# Patient Record
Sex: FEMALE | Race: BLACK OR AFRICAN AMERICAN | Employment: OTHER | URBAN - METROPOLITAN AREA
[De-identification: names, ages, dates, MRNs, and addresses within clinical notes are randomized per-mention and may not be internally consistent; named-entity substitution may affect disease eponyms.]

---

## 2017-02-11 ENCOUNTER — HOSPITAL ENCOUNTER (OUTPATIENT)
Age: 62
Setting detail: OBSERVATION
Discharge: HOME | End: 2017-02-13
Attending: EMERGENCY MEDICINE | Admitting: INTERNAL MEDICINE
Payer: COMMERCIAL

## 2017-02-11 ENCOUNTER — APPOINTMENT (OUTPATIENT)
Dept: CT IMAGING | Age: 62
End: 2017-02-11
Payer: COMMERCIAL

## 2017-02-11 DIAGNOSIS — M79.602 PAIN OF LEFT UPPER EXTREMITY: Primary | ICD-10-CM

## 2017-02-11 LAB
BASOPHILS ABSOLUTE: 0.1 K/UL (ref 0–0.2)
BASOPHILS RELATIVE PERCENT: 0.8 %
EOSINOPHILS ABSOLUTE: 0.2 K/UL (ref 0–0.7)
EOSINOPHILS RELATIVE PERCENT: 3 %
HCT VFR BLD CALC: 41.2 % (ref 37–47)
HEMOGLOBIN: 13.6 G/DL (ref 12–16)
HYPOCHROMIA: PRESENT
LYMPHOCYTES ABSOLUTE: 2.4 K/UL (ref 1–4.8)
LYMPHOCYTES RELATIVE PERCENT: 31.6 %
MCH RBC QN AUTO: 26.9 PG (ref 27–31.3)
MCHC RBC AUTO-ENTMCNC: 33 % (ref 33–37)
MCV RBC AUTO: 81.5 FL (ref 82–100)
MONOCYTES ABSOLUTE: 0.5 K/UL (ref 0.2–0.8)
MONOCYTES RELATIVE PERCENT: 6.9 %
NEUTROPHILS ABSOLUTE: 4.3 K/UL (ref 1.4–6.5)
NEUTROPHILS RELATIVE PERCENT: 57.7 %
PDW BLD-RTO: 14.1 % (ref 11.5–14.5)
PLATELET # BLD: 309 K/UL (ref 130–400)
RBC # BLD: 5.06 M/UL (ref 4.2–5.4)
WBC # BLD: 7.5 K/UL (ref 4.8–10.8)

## 2017-02-11 PROCEDURE — 85025 COMPLETE CBC W/AUTO DIFF WBC: CPT

## 2017-02-11 PROCEDURE — 84484 ASSAY OF TROPONIN QUANT: CPT

## 2017-02-11 PROCEDURE — 70450 CT HEAD/BRAIN W/O DYE: CPT

## 2017-02-11 PROCEDURE — 85610 PROTHROMBIN TIME: CPT

## 2017-02-11 PROCEDURE — 80053 COMPREHEN METABOLIC PANEL: CPT

## 2017-02-11 PROCEDURE — 85730 THROMBOPLASTIN TIME PARTIAL: CPT

## 2017-02-11 PROCEDURE — 99285 EMERGENCY DEPT VISIT HI MDM: CPT

## 2017-02-11 PROCEDURE — 93005 ELECTROCARDIOGRAM TRACING: CPT

## 2017-02-11 ASSESSMENT — ENCOUNTER SYMPTOMS
COLOR CHANGE: 0
EYE PAIN: 0
BLOOD IN STOOL: 0
VOMITING: 0
SHORTNESS OF BREATH: 0
ABDOMINAL PAIN: 0
COUGH: 0
RHINORRHEA: 0
EYE REDNESS: 0

## 2017-02-12 ENCOUNTER — APPOINTMENT (OUTPATIENT)
Dept: GENERAL RADIOLOGY | Age: 62
End: 2017-02-12
Payer: COMMERCIAL

## 2017-02-12 PROBLEM — G45.9 TIA (TRANSIENT ISCHEMIC ATTACK): Status: ACTIVE | Noted: 2017-02-12

## 2017-02-12 LAB
ALBUMIN SERPL-MCNC: 4 G/DL (ref 3.9–4.9)
ALP BLD-CCNC: 89 U/L (ref 40–130)
ALT SERPL-CCNC: 18 U/L (ref 0–33)
ANION GAP SERPL CALCULATED.3IONS-SCNC: 13 MEQ/L (ref 7–13)
APTT: 24.6 SEC (ref 21.6–35.4)
AST SERPL-CCNC: 16 U/L (ref 0–35)
BILIRUB SERPL-MCNC: 0.2 MG/DL (ref 0–1.2)
BILIRUBIN URINE: NEGATIVE
BLOOD, URINE: NEGATIVE
BUN BLDV-MCNC: 12 MG/DL (ref 8–23)
CALCIUM SERPL-MCNC: 9.5 MG/DL (ref 8.6–10.2)
CHLORIDE BLD-SCNC: 104 MEQ/L (ref 98–107)
CHOLESTEROL, TOTAL: 244 MG/DL (ref 0–199)
CLARITY: CLEAR
CO2: 27 MEQ/L (ref 22–29)
COLOR: YELLOW
CREAT SERPL-MCNC: 0.59 MG/DL (ref 0.5–0.9)
GFR AFRICAN AMERICAN: >60
GFR NON-AFRICAN AMERICAN: >60
GLOBULIN: 3.3 G/DL (ref 2.3–3.5)
GLUCOSE BLD-MCNC: 156 MG/DL (ref 74–109)
GLUCOSE URINE: NEGATIVE MG/DL
HBA1C MFR BLD: 5.7 % (ref 4.8–5.9)
HDLC SERPL-MCNC: 59 MG/DL (ref 40–59)
INR BLD: 0.9
KETONES, URINE: NEGATIVE MG/DL
LDL CHOLESTEROL CALCULATED: 172 MG/DL (ref 0–129)
LEUKOCYTE ESTERASE, URINE: NEGATIVE
NITRITE, URINE: NEGATIVE
PH UA: 6.5 (ref 5–9)
POTASSIUM SERPL-SCNC: 3.8 MEQ/L (ref 3.5–5.1)
PROTEIN UA: NEGATIVE MG/DL
PROTHROMBIN TIME: 9.7 SEC (ref 9.6–12.3)
SODIUM BLD-SCNC: 144 MEQ/L (ref 132–144)
SPECIFIC GRAVITY UA: 1.02 (ref 1–1.03)
TOTAL PROTEIN: 7.3 G/DL (ref 6.4–8.1)
TRIGL SERPL-MCNC: 65 MG/DL (ref 0–200)
TROPONIN: <0.01 NG/ML (ref 0–0.01)
URINE REFLEX TO CULTURE: NORMAL
UROBILINOGEN, URINE: 0.2 E.U./DL

## 2017-02-12 PROCEDURE — 71010 XR CHEST PORTABLE: CPT

## 2017-02-12 PROCEDURE — 36415 COLL VENOUS BLD VENIPUNCTURE: CPT

## 2017-02-12 PROCEDURE — 6370000000 HC RX 637 (ALT 250 FOR IP): Performed by: INTERNAL MEDICINE

## 2017-02-12 PROCEDURE — 96374 THER/PROPH/DIAG INJ IV PUSH: CPT

## 2017-02-12 PROCEDURE — G0378 HOSPITAL OBSERVATION PER HR: HCPCS

## 2017-02-12 PROCEDURE — 93306 TTE W/DOPPLER COMPLETE: CPT

## 2017-02-12 PROCEDURE — 81003 URINALYSIS AUTO W/O SCOPE: CPT

## 2017-02-12 PROCEDURE — 80061 LIPID PANEL: CPT

## 2017-02-12 PROCEDURE — 6360000002 HC RX W HCPCS: Performed by: INTERNAL MEDICINE

## 2017-02-12 PROCEDURE — 72052 X-RAY EXAM NECK SPINE 6/>VWS: CPT

## 2017-02-12 PROCEDURE — 83036 HEMOGLOBIN GLYCOSYLATED A1C: CPT

## 2017-02-12 PROCEDURE — 2580000003 HC RX 258: Performed by: INTERNAL MEDICINE

## 2017-02-12 PROCEDURE — 6370000000 HC RX 637 (ALT 250 FOR IP): Performed by: EMERGENCY MEDICINE

## 2017-02-12 PROCEDURE — 93005 ELECTROCARDIOGRAM TRACING: CPT

## 2017-02-12 PROCEDURE — 84484 ASSAY OF TROPONIN QUANT: CPT

## 2017-02-12 RX ORDER — NITROGLYCERIN 0.4 MG/1
0.4 TABLET SUBLINGUAL EVERY 5 MIN PRN
Status: DISCONTINUED | OUTPATIENT
Start: 2017-02-12 | End: 2017-02-13 | Stop reason: HOSPADM

## 2017-02-12 RX ORDER — ASPIRIN 81 MG/1
81 TABLET ORAL DAILY
Status: DISCONTINUED | OUTPATIENT
Start: 2017-02-13 | End: 2017-02-13 | Stop reason: HOSPADM

## 2017-02-12 RX ORDER — SENNA PLUS 8.6 MG/1
1 TABLET ORAL 2 TIMES DAILY PRN
Status: DISCONTINUED | OUTPATIENT
Start: 2017-02-12 | End: 2017-02-13 | Stop reason: HOSPADM

## 2017-02-12 RX ORDER — ASPIRIN 325 MG
325 TABLET ORAL ONCE
Status: COMPLETED | OUTPATIENT
Start: 2017-02-12 | End: 2017-02-12

## 2017-02-12 RX ORDER — SODIUM CHLORIDE 0.9 % (FLUSH) 0.9 %
10 SYRINGE (ML) INJECTION PRN
Status: DISCONTINUED | OUTPATIENT
Start: 2017-02-12 | End: 2017-02-13 | Stop reason: HOSPADM

## 2017-02-12 RX ORDER — AMLODIPINE BESYLATE 5 MG/1
5 TABLET ORAL DAILY
Qty: 30 TABLET | Refills: 3 | Status: SHIPPED | OUTPATIENT
Start: 2017-02-12 | End: 2017-02-13

## 2017-02-12 RX ORDER — FAMOTIDINE 20 MG/1
20 TABLET, FILM COATED ORAL 2 TIMES DAILY
Status: DISCONTINUED | OUTPATIENT
Start: 2017-02-12 | End: 2017-02-13 | Stop reason: HOSPADM

## 2017-02-12 RX ORDER — ACETAMINOPHEN 325 MG/1
650 TABLET ORAL EVERY 4 HOURS PRN
Status: DISCONTINUED | OUTPATIENT
Start: 2017-02-12 | End: 2017-02-13 | Stop reason: HOSPADM

## 2017-02-12 RX ORDER — ALUMINA, MAGNESIA, AND SIMETHICONE 2400; 2400; 240 MG/30ML; MG/30ML; MG/30ML
30 SUSPENSION ORAL EVERY 6 HOURS PRN
Status: DISCONTINUED | OUTPATIENT
Start: 2017-02-12 | End: 2017-02-13 | Stop reason: HOSPADM

## 2017-02-12 RX ORDER — SODIUM CHLORIDE 0.9 % (FLUSH) 0.9 %
10 SYRINGE (ML) INJECTION EVERY 12 HOURS SCHEDULED
Status: DISCONTINUED | OUTPATIENT
Start: 2017-02-12 | End: 2017-02-13 | Stop reason: HOSPADM

## 2017-02-12 RX ORDER — AMLODIPINE BESYLATE 5 MG/1
5 TABLET ORAL DAILY
Status: DISCONTINUED | OUTPATIENT
Start: 2017-02-12 | End: 2017-02-13

## 2017-02-12 RX ORDER — ASPIRIN 81 MG/1
81 TABLET ORAL DAILY
Qty: 30 TABLET | Refills: 3 | Status: SHIPPED | OUTPATIENT
Start: 2017-02-13

## 2017-02-12 RX ORDER — TIZANIDINE 2 MG/1
4 TABLET ORAL 3 TIMES DAILY
Status: DISCONTINUED | OUTPATIENT
Start: 2017-02-12 | End: 2017-02-13 | Stop reason: HOSPADM

## 2017-02-12 RX ORDER — ATORVASTATIN CALCIUM 40 MG/1
40 TABLET, FILM COATED ORAL NIGHTLY
Qty: 30 TABLET | Refills: 3 | Status: SHIPPED | OUTPATIENT
Start: 2017-02-12 | End: 2017-12-27 | Stop reason: SDUPTHER

## 2017-02-12 RX ORDER — POLYETHYLENE GLYCOL 3350 17 G/17G
17 POWDER, FOR SOLUTION ORAL DAILY PRN
Status: DISCONTINUED | OUTPATIENT
Start: 2017-02-12 | End: 2017-02-13 | Stop reason: HOSPADM

## 2017-02-12 RX ORDER — HYDRALAZINE HYDROCHLORIDE 20 MG/ML
10 INJECTION INTRAMUSCULAR; INTRAVENOUS EVERY 4 HOURS PRN
Status: DISCONTINUED | OUTPATIENT
Start: 2017-02-12 | End: 2017-02-13 | Stop reason: HOSPADM

## 2017-02-12 RX ORDER — ATORVASTATIN CALCIUM 40 MG/1
40 TABLET, FILM COATED ORAL NIGHTLY
Status: DISCONTINUED | OUTPATIENT
Start: 2017-02-12 | End: 2017-02-13 | Stop reason: HOSPADM

## 2017-02-12 RX ORDER — DOCUSATE SODIUM 100 MG/1
100 CAPSULE, LIQUID FILLED ORAL DAILY PRN
Status: DISCONTINUED | OUTPATIENT
Start: 2017-02-12 | End: 2017-02-13 | Stop reason: HOSPADM

## 2017-02-12 RX ORDER — ONDANSETRON 2 MG/ML
4 INJECTION INTRAMUSCULAR; INTRAVENOUS EVERY 6 HOURS PRN
Status: DISCONTINUED | OUTPATIENT
Start: 2017-02-12 | End: 2017-02-13 | Stop reason: HOSPADM

## 2017-02-12 RX ADMIN — FAMOTIDINE 20 MG: 20 TABLET, FILM COATED ORAL at 19:36

## 2017-02-12 RX ADMIN — FAMOTIDINE 20 MG: 20 TABLET, FILM COATED ORAL at 03:26

## 2017-02-12 RX ADMIN — METOPROLOL TARTRATE 12.5 MG: 25 TABLET ORAL at 11:40

## 2017-02-12 RX ADMIN — ASPIRIN 325 MG: 325 TABLET, COATED ORAL at 08:56

## 2017-02-12 RX ADMIN — ATORVASTATIN CALCIUM 40 MG: 40 TABLET, FILM COATED ORAL at 19:36

## 2017-02-12 RX ADMIN — TIZANIDINE 4 MG: 2 TABLET ORAL at 03:28

## 2017-02-12 RX ADMIN — NITROGLYCERIN 0.5 INCH: 20 OINTMENT TOPICAL at 08:55

## 2017-02-12 RX ADMIN — METOPROLOL TARTRATE 12.5 MG: 25 TABLET ORAL at 03:27

## 2017-02-12 RX ADMIN — FAMOTIDINE 20 MG: 20 TABLET, FILM COATED ORAL at 08:56

## 2017-02-12 RX ADMIN — HYDRALAZINE HYDROCHLORIDE 10 MG: 20 INJECTION INTRAMUSCULAR; INTRAVENOUS at 03:25

## 2017-02-12 RX ADMIN — Medication 10 ML: at 19:36

## 2017-02-12 RX ADMIN — AMLODIPINE BESYLATE 5 MG: 5 TABLET ORAL at 17:13

## 2017-02-12 RX ADMIN — Medication 10 ML: at 08:55

## 2017-02-12 RX ADMIN — ASPIRIN 325 MG: 325 TABLET ORAL at 01:27

## 2017-02-13 ENCOUNTER — APPOINTMENT (OUTPATIENT)
Dept: ULTRASOUND IMAGING | Age: 62
End: 2017-02-13
Payer: COMMERCIAL

## 2017-02-13 VITALS
RESPIRATION RATE: 16 BRPM | TEMPERATURE: 98.2 F | HEART RATE: 64 BPM | WEIGHT: 217.4 LBS | BODY MASS INDEX: 40.01 KG/M2 | HEIGHT: 62 IN | OXYGEN SATURATION: 98 % | DIASTOLIC BLOOD PRESSURE: 99 MMHG | SYSTOLIC BLOOD PRESSURE: 155 MMHG

## 2017-02-13 LAB
ALBUMIN SERPL-MCNC: 3.7 G/DL (ref 3.9–4.9)
ALP BLD-CCNC: 80 U/L (ref 40–130)
ALT SERPL-CCNC: 15 U/L (ref 0–33)
ANION GAP SERPL CALCULATED.3IONS-SCNC: 11 MEQ/L (ref 7–13)
AST SERPL-CCNC: 13 U/L (ref 0–35)
BASOPHILS ABSOLUTE: 0 K/UL (ref 0–0.2)
BASOPHILS RELATIVE PERCENT: 0.3 %
BILIRUB SERPL-MCNC: 0.2 MG/DL (ref 0–1.2)
BUN BLDV-MCNC: 16 MG/DL (ref 8–23)
CALCIUM SERPL-MCNC: 9.2 MG/DL (ref 8.6–10.2)
CHLORIDE BLD-SCNC: 105 MEQ/L (ref 98–107)
CO2: 25 MEQ/L (ref 22–29)
CREAT SERPL-MCNC: 0.84 MG/DL (ref 0.5–0.9)
EKG ATRIAL RATE: 62 BPM
EKG P AXIS: 48 DEGREES
EKG P-R INTERVAL: 192 MS
EKG Q-T INTERVAL: 422 MS
EKG QRS DURATION: 80 MS
EKG QTC CALCULATION (BAZETT): 428 MS
EKG R AXIS: 6 DEGREES
EKG T AXIS: 21 DEGREES
EKG VENTRICULAR RATE: 62 BPM
EOSINOPHILS ABSOLUTE: 0.2 K/UL (ref 0–0.7)
EOSINOPHILS RELATIVE PERCENT: 3 %
GFR AFRICAN AMERICAN: >60
GFR NON-AFRICAN AMERICAN: >60
GLOBULIN: 3.1 G/DL (ref 2.3–3.5)
GLUCOSE BLD-MCNC: 104 MG/DL (ref 74–109)
HCT VFR BLD CALC: 40.8 % (ref 37–47)
HEMOGLOBIN: 13.2 G/DL (ref 12–16)
HYPOCHROMIA: PRESENT
LYMPHOCYTES ABSOLUTE: 2.4 K/UL (ref 1–4.8)
LYMPHOCYTES RELATIVE PERCENT: 32.7 %
MCH RBC QN AUTO: 26.7 PG (ref 27–31.3)
MCHC RBC AUTO-ENTMCNC: 32.3 % (ref 33–37)
MCV RBC AUTO: 82.7 FL (ref 82–100)
MONOCYTES ABSOLUTE: 0.3 K/UL (ref 0.2–0.8)
MONOCYTES RELATIVE PERCENT: 4.3 %
NEUTROPHILS ABSOLUTE: 4.3 K/UL (ref 1.4–6.5)
NEUTROPHILS RELATIVE PERCENT: 59.7 %
PDW BLD-RTO: 14.2 % (ref 11.5–14.5)
PLATELET # BLD: 278 K/UL (ref 130–400)
POTASSIUM SERPL-SCNC: 3.9 MEQ/L (ref 3.5–5.1)
RBC # BLD: 4.94 M/UL (ref 4.2–5.4)
SODIUM BLD-SCNC: 141 MEQ/L (ref 132–144)
TOTAL PROTEIN: 6.8 G/DL (ref 6.4–8.1)
TROPONIN: <0.01 NG/ML (ref 0–0.01)
WBC # BLD: 7.3 K/UL (ref 4.8–10.8)

## 2017-02-13 PROCEDURE — 6370000000 HC RX 637 (ALT 250 FOR IP): Performed by: INTERNAL MEDICINE

## 2017-02-13 PROCEDURE — 97162 PT EVAL MOD COMPLEX 30 MIN: CPT

## 2017-02-13 PROCEDURE — G8980 MOBILITY D/C STATUS: HCPCS

## 2017-02-13 PROCEDURE — 80053 COMPREHEN METABOLIC PANEL: CPT

## 2017-02-13 PROCEDURE — G0378 HOSPITAL OBSERVATION PER HR: HCPCS

## 2017-02-13 PROCEDURE — 97110 THERAPEUTIC EXERCISES: CPT

## 2017-02-13 PROCEDURE — G8978 MOBILITY CURRENT STATUS: HCPCS

## 2017-02-13 PROCEDURE — 84484 ASSAY OF TROPONIN QUANT: CPT

## 2017-02-13 PROCEDURE — G8979 MOBILITY GOAL STATUS: HCPCS

## 2017-02-13 PROCEDURE — 93880 EXTRACRANIAL BILAT STUDY: CPT

## 2017-02-13 PROCEDURE — 36415 COLL VENOUS BLD VENIPUNCTURE: CPT

## 2017-02-13 PROCEDURE — 85025 COMPLETE CBC W/AUTO DIFF WBC: CPT

## 2017-02-13 RX ORDER — AMLODIPINE BESYLATE 5 MG/1
10 TABLET ORAL DAILY
Qty: 30 TABLET | Refills: 3 | Status: SHIPPED | OUTPATIENT
Start: 2017-02-13 | End: 2017-06-12 | Stop reason: SDUPTHER

## 2017-02-13 RX ORDER — AMLODIPINE BESYLATE 5 MG/1
5 TABLET ORAL ONCE
Status: COMPLETED | OUTPATIENT
Start: 2017-02-13 | End: 2017-02-13

## 2017-02-13 RX ORDER — AMLODIPINE BESYLATE 10 MG/1
10 TABLET ORAL DAILY
Status: DISCONTINUED | OUTPATIENT
Start: 2017-02-14 | End: 2017-02-13 | Stop reason: HOSPADM

## 2017-02-13 RX ORDER — BUTALBITAL, ACETAMINOPHEN AND CAFFEINE 300; 40; 50 MG/1; MG/1; MG/1
1 CAPSULE ORAL EVERY 6 HOURS PRN
Status: DISCONTINUED | OUTPATIENT
Start: 2017-02-13 | End: 2017-02-13 | Stop reason: HOSPADM

## 2017-02-13 RX ADMIN — ASPIRIN 81 MG: 81 TABLET ORAL at 11:14

## 2017-02-13 RX ADMIN — AMLODIPINE BESYLATE 5 MG: 5 TABLET ORAL at 11:14

## 2017-02-13 RX ADMIN — AMLODIPINE BESYLATE 5 MG: 5 TABLET ORAL at 11:13

## 2017-02-13 RX ADMIN — FAMOTIDINE 20 MG: 20 TABLET, FILM COATED ORAL at 11:15

## 2017-02-13 RX ADMIN — TIZANIDINE 4 MG: 2 TABLET ORAL at 11:15

## 2017-02-13 ASSESSMENT — PAIN SCALES - GENERAL: PAINLEVEL_OUTOF10: 0

## 2017-02-14 ENCOUNTER — OFFICE VISIT (OUTPATIENT)
Dept: FAMILY MEDICINE CLINIC | Age: 62
End: 2017-02-14

## 2017-02-14 VITALS
BODY MASS INDEX: 39.93 KG/M2 | DIASTOLIC BLOOD PRESSURE: 94 MMHG | TEMPERATURE: 98.1 F | HEIGHT: 62 IN | HEART RATE: 78 BPM | WEIGHT: 217 LBS | RESPIRATION RATE: 16 BRPM | SYSTOLIC BLOOD PRESSURE: 152 MMHG

## 2017-02-14 DIAGNOSIS — M47.12 OSTEOARTHRITIS OF CERVICAL SPINE WITH MYELOPATHY: ICD-10-CM

## 2017-02-14 DIAGNOSIS — G44.52 NEW DAILY PERSISTENT HEADACHE: ICD-10-CM

## 2017-02-14 DIAGNOSIS — E78.2 MIXED HYPERLIPIDEMIA: Primary | ICD-10-CM

## 2017-02-14 DIAGNOSIS — R43.0 ANOSMIA: ICD-10-CM

## 2017-02-14 DIAGNOSIS — I10 ESSENTIAL HYPERTENSION: ICD-10-CM

## 2017-02-14 PROCEDURE — 3014F SCREEN MAMMO DOC REV: CPT | Performed by: FAMILY MEDICINE

## 2017-02-14 PROCEDURE — 99214 OFFICE O/P EST MOD 30 MIN: CPT | Performed by: FAMILY MEDICINE

## 2017-02-14 PROCEDURE — G8419 CALC BMI OUT NRM PARAM NOF/U: HCPCS | Performed by: FAMILY MEDICINE

## 2017-02-14 PROCEDURE — 3017F COLORECTAL CA SCREEN DOC REV: CPT | Performed by: FAMILY MEDICINE

## 2017-02-14 PROCEDURE — 1036F TOBACCO NON-USER: CPT | Performed by: FAMILY MEDICINE

## 2017-02-14 PROCEDURE — G8427 DOCREV CUR MEDS BY ELIG CLIN: HCPCS | Performed by: FAMILY MEDICINE

## 2017-02-14 PROCEDURE — G8484 FLU IMMUNIZE NO ADMIN: HCPCS | Performed by: FAMILY MEDICINE

## 2017-02-14 RX ORDER — ALPRAZOLAM 0.5 MG/1
0.5 TABLET ORAL 3 TIMES DAILY PRN
Qty: 10 TABLET | Refills: 0 | Status: SHIPPED | OUTPATIENT
Start: 2017-02-14 | End: 2017-03-16

## 2017-02-14 RX ORDER — FLUTICASONE PROPIONATE 50 MCG
1 SPRAY, SUSPENSION (ML) NASAL DAILY
Qty: 1 BOTTLE | Refills: 3 | Status: SHIPPED | OUTPATIENT
Start: 2017-02-14 | End: 2017-09-25 | Stop reason: SDUPTHER

## 2017-02-18 ENCOUNTER — HOSPITAL ENCOUNTER (OUTPATIENT)
Dept: MRI IMAGING | Age: 62
Discharge: HOME | End: 2017-02-18
Payer: COMMERCIAL

## 2017-02-18 DIAGNOSIS — G44.52 NEW DAILY PERSISTENT HEADACHE: ICD-10-CM

## 2017-02-18 DIAGNOSIS — R43.0 ANOSMIA: ICD-10-CM

## 2017-02-25 ENCOUNTER — HOSPITAL ENCOUNTER (OUTPATIENT)
Dept: MRI IMAGING | Age: 62
Discharge: HOME | End: 2017-02-25
Payer: COMMERCIAL

## 2017-02-25 DIAGNOSIS — R51.9 FREQUENT HEADACHES: ICD-10-CM

## 2017-02-25 PROCEDURE — 70551 MRI BRAIN STEM W/O DYE: CPT

## 2017-03-13 ENCOUNTER — OFFICE VISIT (OUTPATIENT)
Dept: FAMILY MEDICINE CLINIC | Age: 62
End: 2017-03-13

## 2017-03-13 VITALS
RESPIRATION RATE: 14 BRPM | WEIGHT: 221 LBS | DIASTOLIC BLOOD PRESSURE: 84 MMHG | HEART RATE: 72 BPM | BODY MASS INDEX: 40.67 KG/M2 | TEMPERATURE: 97.7 F | SYSTOLIC BLOOD PRESSURE: 124 MMHG | HEIGHT: 62 IN

## 2017-03-13 DIAGNOSIS — E78.2 MIXED HYPERLIPIDEMIA: ICD-10-CM

## 2017-03-13 DIAGNOSIS — Z12.31 ENCOUNTER FOR SCREENING MAMMOGRAM FOR BREAST CANCER: ICD-10-CM

## 2017-03-13 DIAGNOSIS — I10 ESSENTIAL HYPERTENSION: Primary | ICD-10-CM

## 2017-03-13 DIAGNOSIS — I67.9 CEREBROVASCULAR SMALL VESSEL DISEASE: ICD-10-CM

## 2017-03-13 PROCEDURE — 1036F TOBACCO NON-USER: CPT | Performed by: FAMILY MEDICINE

## 2017-03-13 PROCEDURE — 3017F COLORECTAL CA SCREEN DOC REV: CPT | Performed by: FAMILY MEDICINE

## 2017-03-13 PROCEDURE — G8417 CALC BMI ABV UP PARAM F/U: HCPCS | Performed by: FAMILY MEDICINE

## 2017-03-13 PROCEDURE — G8484 FLU IMMUNIZE NO ADMIN: HCPCS | Performed by: FAMILY MEDICINE

## 2017-03-13 PROCEDURE — 99213 OFFICE O/P EST LOW 20 MIN: CPT | Performed by: FAMILY MEDICINE

## 2017-03-13 PROCEDURE — G8427 DOCREV CUR MEDS BY ELIG CLIN: HCPCS | Performed by: FAMILY MEDICINE

## 2017-03-13 PROCEDURE — 3014F SCREEN MAMMO DOC REV: CPT | Performed by: FAMILY MEDICINE

## 2017-03-13 ASSESSMENT — PATIENT HEALTH QUESTIONNAIRE - PHQ9
SUM OF ALL RESPONSES TO PHQ9 QUESTIONS 1 & 2: 0
2. FEELING DOWN, DEPRESSED OR HOPELESS: 0
1. LITTLE INTEREST OR PLEASURE IN DOING THINGS: 0
SUM OF ALL RESPONSES TO PHQ QUESTIONS 1-9: 0

## 2017-03-17 LAB
EKG ATRIAL RATE: 91 BPM
EKG P AXIS: 55 DEGREES
EKG P-R INTERVAL: 196 MS
EKG Q-T INTERVAL: 370 MS
EKG QRS DURATION: 68 MS
EKG QTC CALCULATION (BAZETT): 455 MS
EKG R AXIS: 15 DEGREES
EKG T AXIS: 17 DEGREES
EKG VENTRICULAR RATE: 91 BPM

## 2017-06-12 DIAGNOSIS — E78.2 MIXED HYPERLIPIDEMIA: Primary | ICD-10-CM

## 2017-06-12 DIAGNOSIS — I10 ESSENTIAL HYPERTENSION: ICD-10-CM

## 2017-06-12 RX ORDER — AMLODIPINE BESYLATE 5 MG/1
10 TABLET ORAL DAILY
Qty: 60 TABLET | Refills: 0 | Status: SHIPPED | OUTPATIENT
Start: 2017-06-12 | End: 2017-07-11 | Stop reason: SDUPTHER

## 2017-06-24 DIAGNOSIS — I10 ESSENTIAL HYPERTENSION: ICD-10-CM

## 2017-06-24 DIAGNOSIS — E78.2 MIXED HYPERLIPIDEMIA: ICD-10-CM

## 2017-06-24 LAB
ALBUMIN SERPL-MCNC: 4.1 G/DL (ref 3.9–4.9)
ALP BLD-CCNC: 101 U/L (ref 40–130)
ALT SERPL-CCNC: 19 U/L (ref 0–33)
ANION GAP SERPL CALCULATED.3IONS-SCNC: 11 MEQ/L (ref 7–13)
AST SERPL-CCNC: 13 U/L (ref 0–35)
BILIRUB SERPL-MCNC: 0.3 MG/DL (ref 0–1.2)
BUN BLDV-MCNC: 11 MG/DL (ref 8–23)
CALCIUM SERPL-MCNC: 9.2 MG/DL (ref 8.6–10.2)
CHLORIDE BLD-SCNC: 104 MEQ/L (ref 98–107)
CHOLESTEROL, TOTAL: 185 MG/DL (ref 0–199)
CO2: 27 MEQ/L (ref 22–29)
CREAT SERPL-MCNC: 0.41 MG/DL (ref 0.5–0.9)
GFR AFRICAN AMERICAN: >60
GFR NON-AFRICAN AMERICAN: >60
GLOBULIN: 3.2 G/DL (ref 2.3–3.5)
GLUCOSE BLD-MCNC: 94 MG/DL (ref 74–109)
HDLC SERPL-MCNC: 68 MG/DL (ref 40–59)
LDL CHOLESTEROL CALCULATED: 100 MG/DL (ref 0–129)
POTASSIUM SERPL-SCNC: 4.4 MEQ/L (ref 3.5–5.1)
SODIUM BLD-SCNC: 142 MEQ/L (ref 132–144)
TOTAL PROTEIN: 7.3 G/DL (ref 6.4–8.1)
TRIGL SERPL-MCNC: 85 MG/DL (ref 0–200)

## 2017-07-11 ENCOUNTER — OFFICE VISIT (OUTPATIENT)
Dept: FAMILY MEDICINE CLINIC | Age: 62
End: 2017-07-11

## 2017-07-11 VITALS
DIASTOLIC BLOOD PRESSURE: 84 MMHG | SYSTOLIC BLOOD PRESSURE: 124 MMHG | HEART RATE: 72 BPM | HEIGHT: 62 IN | WEIGHT: 219 LBS | BODY MASS INDEX: 40.3 KG/M2 | TEMPERATURE: 98 F | RESPIRATION RATE: 14 BRPM

## 2017-07-11 DIAGNOSIS — R43.0 ANOSMIA: ICD-10-CM

## 2017-07-11 DIAGNOSIS — E78.2 MIXED HYPERLIPIDEMIA: ICD-10-CM

## 2017-07-11 DIAGNOSIS — Z12.31 VISIT FOR SCREENING MAMMOGRAM: ICD-10-CM

## 2017-07-11 DIAGNOSIS — I10 ESSENTIAL HYPERTENSION: Primary | ICD-10-CM

## 2017-07-11 PROCEDURE — 3017F COLORECTAL CA SCREEN DOC REV: CPT | Performed by: FAMILY MEDICINE

## 2017-07-11 PROCEDURE — G8427 DOCREV CUR MEDS BY ELIG CLIN: HCPCS | Performed by: FAMILY MEDICINE

## 2017-07-11 PROCEDURE — G8417 CALC BMI ABV UP PARAM F/U: HCPCS | Performed by: FAMILY MEDICINE

## 2017-07-11 PROCEDURE — 1036F TOBACCO NON-USER: CPT | Performed by: FAMILY MEDICINE

## 2017-07-11 PROCEDURE — 3014F SCREEN MAMMO DOC REV: CPT | Performed by: FAMILY MEDICINE

## 2017-07-11 PROCEDURE — 99214 OFFICE O/P EST MOD 30 MIN: CPT | Performed by: FAMILY MEDICINE

## 2017-07-11 RX ORDER — AMLODIPINE BESYLATE 5 MG/1
10 TABLET ORAL DAILY
Qty: 60 TABLET | Refills: 11 | Status: SHIPPED | OUTPATIENT
Start: 2017-07-11 | End: 2017-07-11

## 2017-07-11 RX ORDER — AMLODIPINE BESYLATE 10 MG/1
10 TABLET ORAL DAILY
Qty: 30 TABLET | Refills: 11 | Status: SHIPPED | OUTPATIENT
Start: 2017-07-11 | End: 2018-06-25 | Stop reason: SDUPTHER

## 2017-07-17 ENCOUNTER — HOSPITAL ENCOUNTER (OUTPATIENT)
Dept: WOMENS IMAGING | Age: 62
Discharge: HOME | End: 2017-07-17
Payer: COMMERCIAL

## 2017-07-17 DIAGNOSIS — Z12.31 VISIT FOR SCREENING MAMMOGRAM: ICD-10-CM

## 2017-07-17 PROCEDURE — G0202 SCR MAMMO BI INCL CAD: HCPCS

## 2017-09-26 RX ORDER — FLUTICASONE PROPIONATE 50 MCG
SPRAY, SUSPENSION (ML) NASAL
Qty: 16 G | Refills: 3 | Status: SHIPPED | OUTPATIENT
Start: 2017-09-26

## 2017-12-28 RX ORDER — ATORVASTATIN CALCIUM 40 MG/1
40 TABLET, FILM COATED ORAL NIGHTLY
Qty: 30 TABLET | Refills: 0 | Status: SHIPPED | OUTPATIENT
Start: 2017-12-28 | End: 2018-01-08 | Stop reason: SDUPTHER

## 2018-01-06 DIAGNOSIS — I10 ESSENTIAL HYPERTENSION: ICD-10-CM

## 2018-01-06 DIAGNOSIS — E78.2 MIXED HYPERLIPIDEMIA: Primary | ICD-10-CM

## 2018-01-08 DIAGNOSIS — E78.2 MIXED HYPERLIPIDEMIA: Primary | ICD-10-CM

## 2018-01-08 DIAGNOSIS — E78.2 MIXED HYPERLIPIDEMIA: ICD-10-CM

## 2018-01-08 DIAGNOSIS — I10 ESSENTIAL HYPERTENSION: ICD-10-CM

## 2018-01-08 LAB
ALBUMIN SERPL-MCNC: 3.9 G/DL (ref 3.9–4.9)
ALP BLD-CCNC: 105 U/L (ref 40–130)
ALT SERPL-CCNC: 17 U/L (ref 0–33)
ANION GAP SERPL CALCULATED.3IONS-SCNC: 14 MEQ/L (ref 7–13)
ANISOCYTOSIS: ABNORMAL
AST SERPL-CCNC: 12 U/L (ref 0–35)
BASOPHILS ABSOLUTE: 0.1 K/UL (ref 0–0.2)
BASOPHILS RELATIVE PERCENT: 1.4 %
BILIRUB SERPL-MCNC: 0.4 MG/DL (ref 0–1.2)
BUN BLDV-MCNC: 14 MG/DL (ref 8–23)
CALCIUM SERPL-MCNC: 9.2 MG/DL (ref 8.6–10.2)
CHLORIDE BLD-SCNC: 102 MEQ/L (ref 98–107)
CHOLESTEROL, TOTAL: 225 MG/DL (ref 0–199)
CO2: 28 MEQ/L (ref 22–29)
CREAT SERPL-MCNC: 0.57 MG/DL (ref 0.5–0.9)
EOSINOPHILS ABSOLUTE: 0.2 K/UL (ref 0–0.7)
EOSINOPHILS RELATIVE PERCENT: 3.2 %
GFR AFRICAN AMERICAN: >60
GFR NON-AFRICAN AMERICAN: >60
GLOBULIN: 3.2 G/DL (ref 2.3–3.5)
GLUCOSE BLD-MCNC: 90 MG/DL (ref 74–109)
HCT VFR BLD CALC: 46.3 % (ref 37–47)
HDLC SERPL-MCNC: 67 MG/DL (ref 40–59)
HEMOGLOBIN: 13.7 G/DL (ref 12–16)
LDL CHOLESTEROL CALCULATED: 144 MG/DL (ref 0–129)
LYMPHOCYTES ABSOLUTE: 2.3 K/UL (ref 1–4.8)
LYMPHOCYTES RELATIVE PERCENT: 37.4 %
MCH RBC QN AUTO: 27 PG (ref 27–31.3)
MCHC RBC AUTO-ENTMCNC: 29.7 % (ref 33–37)
MCV RBC AUTO: 91 FL (ref 82–100)
MONOCYTES ABSOLUTE: 0.6 K/UL (ref 0.2–0.8)
MONOCYTES RELATIVE PERCENT: 10.7 %
NEUTROPHILS ABSOLUTE: 2.9 K/UL (ref 1.4–6.5)
NEUTROPHILS RELATIVE PERCENT: 47.3 %
PDW BLD-RTO: 15.9 % (ref 11.5–14.5)
PLATELET # BLD: 278 K/UL (ref 130–400)
PLATELET SLIDE REVIEW: ADEQUATE
POTASSIUM SERPL-SCNC: 4.3 MEQ/L (ref 3.5–5.1)
RBC # BLD: 5.09 M/UL (ref 4.2–5.4)
SLIDE REVIEW: ABNORMAL
SODIUM BLD-SCNC: 144 MEQ/L (ref 132–144)
TOTAL PROTEIN: 7.1 G/DL (ref 6.4–8.1)
TRIGL SERPL-MCNC: 72 MG/DL (ref 0–200)
WBC # BLD: 6 K/UL (ref 4.8–10.8)

## 2018-01-08 RX ORDER — ATORVASTATIN CALCIUM 40 MG/1
80 TABLET, FILM COATED ORAL NIGHTLY
Qty: 30 TABLET | Refills: 0 | Status: SHIPPED | OUTPATIENT
Start: 2018-01-08 | End: 2018-04-06 | Stop reason: SDUPTHER

## 2018-04-06 DIAGNOSIS — E78.2 MIXED HYPERLIPIDEMIA: ICD-10-CM

## 2018-04-07 RX ORDER — ATORVASTATIN CALCIUM 40 MG/1
80 TABLET, FILM COATED ORAL NIGHTLY
Qty: 60 TABLET | Refills: 5 | Status: SHIPPED | OUTPATIENT
Start: 2018-04-07

## 2018-06-25 RX ORDER — AMLODIPINE BESYLATE 10 MG/1
10 TABLET ORAL DAILY
Qty: 30 TABLET | Refills: 11 | Status: SHIPPED | OUTPATIENT
Start: 2018-06-25 | End: 2018-06-25 | Stop reason: SDUPTHER

## 2018-06-25 RX ORDER — AMLODIPINE BESYLATE 10 MG/1
10 TABLET ORAL DAILY
Qty: 30 TABLET | Refills: 2 | Status: SHIPPED | OUTPATIENT
Start: 2018-06-25

## 2018-07-14 ENCOUNTER — OFFICE VISIT (OUTPATIENT)
Dept: FAMILY MEDICINE CLINIC | Age: 63
End: 2018-07-14
Payer: COMMERCIAL

## 2018-07-14 VITALS
HEIGHT: 62 IN | HEART RATE: 80 BPM | BODY MASS INDEX: 39.56 KG/M2 | WEIGHT: 215 LBS | TEMPERATURE: 97.6 F | DIASTOLIC BLOOD PRESSURE: 76 MMHG | SYSTOLIC BLOOD PRESSURE: 130 MMHG

## 2018-07-14 DIAGNOSIS — M54.12 CERVICAL RADICULOPATHY: Primary | ICD-10-CM

## 2018-07-14 PROCEDURE — G8400 PT W/DXA NO RESULTS DOC: HCPCS | Performed by: INTERNAL MEDICINE

## 2018-07-14 PROCEDURE — 1101F PT FALLS ASSESS-DOCD LE1/YR: CPT | Performed by: INTERNAL MEDICINE

## 2018-07-14 PROCEDURE — 99213 OFFICE O/P EST LOW 20 MIN: CPT | Performed by: INTERNAL MEDICINE

## 2018-07-14 PROCEDURE — G8417 CALC BMI ABV UP PARAM F/U: HCPCS | Performed by: INTERNAL MEDICINE

## 2018-07-14 PROCEDURE — 3017F COLORECTAL CA SCREEN DOC REV: CPT | Performed by: INTERNAL MEDICINE

## 2018-07-14 PROCEDURE — 1090F PRES/ABSN URINE INCON ASSESS: CPT | Performed by: INTERNAL MEDICINE

## 2018-07-14 PROCEDURE — 1123F ACP DISCUSS/DSCN MKR DOCD: CPT | Performed by: INTERNAL MEDICINE

## 2018-07-14 PROCEDURE — 4040F PNEUMOC VAC/ADMIN/RCVD: CPT | Performed by: INTERNAL MEDICINE

## 2018-07-14 PROCEDURE — G8427 DOCREV CUR MEDS BY ELIG CLIN: HCPCS | Performed by: INTERNAL MEDICINE

## 2018-07-14 PROCEDURE — 1036F TOBACCO NON-USER: CPT | Performed by: INTERNAL MEDICINE

## 2018-07-14 ASSESSMENT — ENCOUNTER SYMPTOMS
EYE PAIN: 0
BACK PAIN: 0
ABDOMINAL PAIN: 0
SHORTNESS OF BREATH: 0

## 2018-07-14 NOTE — PATIENT INSTRUCTIONS
Patient Education        Pinched Nerve in the Neck: Care Instructions  Your Care Instructions  A pinched nerve in the neck happens when a vertebra or disc in the upper part of your spine is damaged. This damage can happen because of an injury. Or it can just happen with age. The changes caused by the damage may put pressure on a nearby nerve root, pinching it. This causes symptoms such as sharp pain in your neck, shoulder, arm, hand, or back. You may also have tingling or numbness. Sometimes it makes your arm weaker. The symptoms are usually worse when you turn your head or strain your neck. For many people, the symptoms get better over time and finally go away. Early treatment usually includes medicines for pain and swelling. Sometimes physical therapy and special exercises may help. Follow-up care is a key part of your treatment and safety. Be sure to make and go to all appointments, and call your doctor if you are having problems. It's also a good idea to know your test results and keep a list of the medicines you take. How can you care for yourself at home? · Be safe with medicines. Read and follow all instructions on the label. ¨ If the doctor gave you a prescription medicine for pain, take it as prescribed. ¨ If you are not taking a prescription pain medicine, ask your doctor if you can take an over-the-counter medicine. · Try using a heating pad on a low or medium setting for 15 to 20 minutes every 2 or 3 hours. Try a warm shower in place of one session with the heating pad. You can also buy single-use heat wraps that last up to 8 hours. · You can also try an ice pack for 10 to 15 minutes every 2 to 3 hours. There isn't strong evidence that either heat or ice will help. But you can try them to see if they help you. · Don't spend too long in one position. Take short breaks to move around and change positions. · Wear a seat belt and shoulder harness when you are in a car.   · Sleep with a pillow

## 2018-07-14 NOTE — PROGRESS NOTES
Subjective:      Patient ID: Batool Richmond is a 70 y.o. female who presents today with:  Chief Complaint   Patient presents with    Arm Pain     numbness sx x 2-3 weeks    Neck Pain       HPI    Muscle pains for a few weeks. She mentions she's had these before. She thought it was from sleeping with a fan. No weight loss. No fevers or chills. Tried icy hot. Pain is in her left neck and she gets radiation into her left arm. No chest pain or sob. No sweats. No nausea or vomiting. This is not a new problem but it comes and goes. About 4 years of intermittent symptoms. She did PT in the past and her symptoms went away. No focal weakness. No stroke like symptoms. Her TIA symptoms in the past were NOT like this. Past Medical History:   Diagnosis Date    Cerebrovascular small vessel disease 2017    Hyperlipidemia     Hypertension      Past Surgical History:   Procedure Laterality Date    BREAST BIOPSY Right     CARDIAC CATHETERIZATION  2011    negative, Dr Rodrigo Bansal  4/15/09    DR Daniel Paniagua    HYSTERECTOMY  1996    TUMOR REMOVAL      NECK     Social History     Social History    Marital status:      Spouse name: N/A    Number of children: N/A    Years of education: N/A     Occupational History    Not on file. Social History Main Topics    Smoking status: Never Smoker    Smokeless tobacco: Never Used    Alcohol use No    Drug use: No    Sexual activity: Not on file     Other Topics Concern    Not on file     Social History Narrative    No narrative on file     Allergies   Allergen Reactions    Codeine Other (See Comments)     Given in cough syrup as child and developed severe agitation.       Current Outpatient Prescriptions on File Prior to Visit   Medication Sig Dispense Refill    amLODIPine (NORVASC) 10 MG tablet Take 1 tablet by mouth daily 30 tablet 2    atorvastatin (LIPITOR) 40 MG tablet Take 2 tablets by mouth nightly 60 tablet 5    fluticasone (FLONASE) 50

## 2018-07-16 ENCOUNTER — TELEPHONE (OUTPATIENT)
Dept: FAMILY MEDICINE CLINIC | Age: 63
End: 2018-07-16

## 2018-07-16 DIAGNOSIS — L60.9 NAIL DISORDER: Primary | ICD-10-CM

## 2018-07-17 NOTE — TELEPHONE ENCOUNTER
Sent to derm, I think it's wisest to get a second opinion about her dark nail to exclude malignancy.

## 2018-07-27 ENCOUNTER — HOSPITAL ENCOUNTER (OUTPATIENT)
Dept: PHYSICAL THERAPY | Age: 63
Setting detail: THERAPIES SERIES
Discharge: HOME | End: 2018-07-27
Payer: COMMERCIAL

## 2018-07-27 PROCEDURE — 97162 PT EVAL MOD COMPLEX 30 MIN: CPT

## 2018-07-27 ASSESSMENT — PAIN DESCRIPTION - PAIN TYPE: TYPE: CHRONIC PAIN

## 2018-07-27 ASSESSMENT — PAIN SCALES - GENERAL: PAINLEVEL_OUTOF10: 2

## 2018-07-27 ASSESSMENT — PAIN DESCRIPTION - ORIENTATION: ORIENTATION: LEFT

## 2018-07-27 ASSESSMENT — PAIN DESCRIPTION - DESCRIPTORS: DESCRIPTORS: DULL

## 2018-07-27 NOTE — PROGRESS NOTES
upper trap for decreasing tightness 1 MHz, 1.2 W/cm2, 8 minutes*  E-stim (parameters): PRN*  Manual:  Manual therapy  PROM: cervical ROM*  Soft Tissue Mobalization: STM cervical paraspinals, upper trap, suboccitpitals*  Other: dry needling PRN*  *Indicates exercise,modality, or manual techniques to be initiated when appropriate  Assessment: Body structures, Functions, Activity limitations: Decreased ROM, Decreased strength, Decreased endurance, Decreased sensation  Assessment: Patient reports recent left sided cervical and upper trap pain with radiating numbness and tingling down L UE in hand. Upon PT evaluation, patient demonstrates decreased cervical ROM with impaired strength in left UE compared to right. Additionally, numbness and tingling intermittent throughout evaluation in left UE. Further outpatient PT recommended to decrease radicular symptoms and improve overall functional tolerance for goals stated below. Prognosis: Good  Discharge Recommendations: Continue to assess pending progress        Decision Making: Medium Complexity  History: HTN, age, hyterectomy  Exam: decreased sensation, decreased UE strength, decreased endurance in deep neck flexors, decreased cervical and left shoulder ROM  Clinical Presentation: evolving      Plan  Frequency/Duration:  Plan  Times per week: 2  Plan weeks: 6 weeks  Current Treatment Recommendations: Strengthening, ROM, Endurance Training, Neuromuscular Re-education, Manual Therapy - Soft Tissue Mobilization, Manual Therapy - Joint Manipulation, Home Exercise Program, Safety Education & Training, Patient/Caregiver Education & Training, Modalities, Integrated Dry Needling  Plan Comment: transfer care to Armando Hilliard PT     G-Codes   NA    Patient Education  New Education Provided: goals of PT, HEP    POST-PAIN     Pain Rating (0-10 pain scale):   2/10  Location and pain description same as pre-treatment unless indicated.    Action: [] NA  [] Call Physician  [x] Perform HEP  [x] Meds as prescribed    Evaluation and patient rights have been reviewed and patient agrees with plan of care. Yes  [x]  No  []   Explain:       [place] Fall Risk Assessment  Risk Factor Scale  Score   History of Falls [] Yes  [x] No 25  0 0   Secondary Diagnosis [] Yes  [x] No 15  0 0   Ambulatory Aid [] Furniture  [] Crutches/cane/walker  [x] None/bedrest/wheelchair/nurse 30  15  0 0   IV/Heparin Lock [] Yes  [x] No 20  0 0   Gait/Transferring [] Impaired  [] Weak  [x] Normal/bedrest/immobile 20  10  0 0   Mental Status [] Forgets limitations  [x] Oriented to own ability 15  0 0      Total: 0     Based on the Assessment score: check the appropriate box. [x]  No intervention needed   Low =   Score of 0-24  []  Use standard prevention interventions Moderate =  Score of 24-44   [] Discuss fall prevention strategies   [] Indicate moderate falls risk on eval  []  Use high risk prevention interventions High = Score of 45 and higher   [] Discuss fall prevention strategies   [] Provide supervision during treatment time    Goals  Short term goals  Time Frame for Short term goals: 4 weeks  Short term goal 1: Patient will report 50% reduction in frequency of radicular symptoms in left UE. Short term goal 2: Patient will be independent with HEP. Long term goals  Time Frame for Long term goals : 6 weeks  Long term goal 1: Patient will increase cervical ROM and left shoulder ROM to [facility] for improved functional tolerance. Long term goal 2: Patient will increase left UE strength >/= 4/5 for improve lifting tolerance. Long term goal 3: Patient will demonstrates improved postural awareness without verbal cues. Long term goal 4: NDI </= 6/50 to demonstrate improved functional tolerance.        PT Individual Minutes  Time In: 7773  Time Out: 1000  Minutes: 40  Timed Code Treatment Minutes: 5 Minutes  Procedure Minutes: 35 PT evaluation minutes  Electronically signed by Lisa Cordon PT on 7/27/18 at 12:02 PM

## 2018-07-27 NOTE — PLAN OF CARE
[address]     Ph: [phone]  Fax: [phone]    [] Certification  [] Recertification [x]  Plan of Care  [] Progress Note [] Discharge      To:  Referring Practitioner: Letha Andersen MD      From:  Cristian Brandon, PT  Patient: Cindy Mesa     : 1955  Diagnosis: Cervical radiculopathy     Date: 2018  Treatment Diagnosis: cervical pain, decreased sensation, decreased UE strength     Progress Report Period from:  2018  to 2018    Total # of Visits to Date: 1              OBJECTIVE:   Short Term Goals - Time Frame for Short term goals: 4 weeks    Goals Current/Discharge status  Met   Short term goal 1: Patient will report 50% reduction in frequency of radicular symptoms in left UE. Patient reports radicular symptoms each day intermittent throughout the day. [] yes  [x] no   Short term goal 2: Patient will be independent with HEP. Patient issued HEP. [] yes  [x] no     Long Term Goals - Time Frame for Long term goals : 6 weeks  Goals Current/ Discharge status Met   Long term goal 1: Patient will increase cervical ROM and left shoulder ROM to [facility] for improved functional tolerance. AROM LUE (degrees)  LUE AROM : Exceptions  L Shoulder Flexion 0-180: 170 deg  L Shoulder Extension 0-45: WFL  L Shoulder ABduction 0-180: 108 deg  L Shoulder Int Rotation  0-70: reaches to sacrum  L Shoulder Ext Rotation  0-90: WFL  Spine  Cervical: flex 55 deg, ext 40 deg, L SB 20 deg, R SB 33 deg, R rotation 70 deg, L rotation 35 deg with increased pain  AROM RUE (degrees)  RUE General AROM: R shoulder WFL   [] yes  [x] no   Long term goal 2: Patient will increase left UE strength >/= 4/5 for improve lifting tolerance.        Strength RUE  Strength RUE: Exception  R Shoulder Flexion: 4/5  R Shoulder Extension: 4/5  R Shoulder ABduction: 4/5  R Shoulder Internal Rotation: 4/5  R Shoulder External

## 2018-07-31 ENCOUNTER — HOSPITAL ENCOUNTER (OUTPATIENT)
Dept: PHYSICAL THERAPY | Age: 63
Setting detail: THERAPIES SERIES
Discharge: HOME | End: 2018-07-31
Payer: COMMERCIAL

## 2018-07-31 PROCEDURE — 97140 MANUAL THERAPY 1/> REGIONS: CPT

## 2018-07-31 PROCEDURE — 97110 THERAPEUTIC EXERCISES: CPT

## 2018-07-31 ASSESSMENT — PAIN DESCRIPTION - LOCATION: LOCATION: NECK;SHOULDER;ARM

## 2018-07-31 ASSESSMENT — PAIN DESCRIPTION - ORIENTATION: ORIENTATION: LEFT

## 2018-07-31 ASSESSMENT — PAIN DESCRIPTION - DESCRIPTORS: DESCRIPTORS: ACHING

## 2018-07-31 ASSESSMENT — PAIN SCALES - GENERAL: PAINLEVEL_OUTOF10: 5

## 2018-07-31 ASSESSMENT — PAIN DESCRIPTION - PAIN TYPE: TYPE: CHRONIC PAIN

## 2018-07-31 NOTE — PROGRESS NOTES
[id number] [address]  Outpatient Physical Therapy    Treatment Note        Date: 2018  Patient: Lexa Rose  : 1955  ACCT #: [de-identified]  Referring Practitioner: Don Woo MD  Diagnosis: Cervical radiculopathy    Visit Information:  PT Visit Information  Onset Date:  (1-2 months ago)  PT Insurance Information: Medical [place]  Total # of Visits Approved:  (based off medical necessity, term date 18)  Total # of Visits to Date: 2  No Show: 1  Canceled Appointment: 0  Progress Note Counter:     Subjective: Pt reports compliant with hep, no change in symptoms   Comments: Arrived late for scheduled appt   HEP Compliance:  [x] Good [] Fair [] Poor [] Reports not doing due to:    Vital Signs  Patient Currently in Pain: Yes   Pain Screening  Patient Currently in Pain: Yes  Pain Assessment  Pain Assessment: 0-10  Pain Level: 5  Pain Type: Chronic pain  Pain Location: Neck; Shoulder;Arm  Pain Orientation: Left  Pain Descriptors: Aching    OBJECTIVE:   Exercises  Exercise 1: supine chin tuck in towel roll 5s x 10  Exercise 2: pulleys x 4 min   Exercise 3: corner stretch 20\"x3- slight incresed N/T after   Exercise 4: upper trap stretch L 20\"x3 wihtout overpressure   Exercise 5: cervical ROM x10 except L SB d/t increased L UE sx's   Exercise 6: scapular retraction 5\"x15   Exercise 7: table slides shoulder abduction*  Exercise 20: HEP: scap retractions, L UT stretch without OP     Strength: [x] NT  [] MMT completed:    ROM: [x] NT  [] ROM measurements:     Manual:   Manual therapy  Soft Tissue Mobalization: STM cervical paraspinals, upper trap, suboccitpitals B L>R x 8 min     Modalities:  Modalities  Moist heat: x10 min to cervical spine seated to deacrease pain and mm tightness     *Indicates exercise, modality, or manual techniques to be initiated when appropriate    Assessment:    Body structures, Functions, Activity limitations: Decreased ROM, Decreased strength, Decreased endurance,

## 2018-08-02 ENCOUNTER — APPOINTMENT (OUTPATIENT)
Dept: PHYSICAL THERAPY | Age: 63
End: 2018-08-02
Payer: MEDICARE

## 2018-08-07 ENCOUNTER — APPOINTMENT (OUTPATIENT)
Dept: PHYSICAL THERAPY | Age: 63
End: 2018-08-07
Payer: MEDICARE

## 2018-08-09 ENCOUNTER — APPOINTMENT (OUTPATIENT)
Dept: PHYSICAL THERAPY | Age: 63
End: 2018-08-09
Payer: MEDICARE

## 2018-08-13 ENCOUNTER — OFFICE VISIT (OUTPATIENT)
Dept: FAMILY MEDICINE CLINIC | Age: 63
End: 2018-08-13
Payer: MEDICARE

## 2018-08-13 VITALS
BODY MASS INDEX: 40.3 KG/M2 | HEIGHT: 62 IN | RESPIRATION RATE: 16 BRPM | HEART RATE: 76 BPM | DIASTOLIC BLOOD PRESSURE: 64 MMHG | WEIGHT: 219 LBS | SYSTOLIC BLOOD PRESSURE: 122 MMHG | TEMPERATURE: 98.1 F

## 2018-08-13 DIAGNOSIS — E78.2 MIXED HYPERLIPIDEMIA: ICD-10-CM

## 2018-08-13 DIAGNOSIS — Z23 NEED FOR PNEUMOCOCCAL VACCINATION: ICD-10-CM

## 2018-08-13 DIAGNOSIS — M47.892 OTHER OSTEOARTHRITIS OF SPINE, CERVICAL REGION: ICD-10-CM

## 2018-08-13 DIAGNOSIS — I10 ESSENTIAL HYPERTENSION: ICD-10-CM

## 2018-08-13 DIAGNOSIS — E78.2 MIXED HYPERLIPIDEMIA: Primary | ICD-10-CM

## 2018-08-13 DIAGNOSIS — R73.9 HYPERGLYCEMIA: ICD-10-CM

## 2018-08-13 DIAGNOSIS — Z12.31 ENCOUNTER FOR SCREENING MAMMOGRAM FOR BREAST CANCER: ICD-10-CM

## 2018-08-13 LAB
ALBUMIN SERPL-MCNC: 4 G/DL (ref 3.9–4.9)
ALP BLD-CCNC: 108 U/L (ref 40–130)
ALT SERPL-CCNC: 24 U/L (ref 0–33)
ANION GAP SERPL CALCULATED.3IONS-SCNC: 13 MEQ/L (ref 7–13)
AST SERPL-CCNC: 20 U/L (ref 0–35)
BILIRUB SERPL-MCNC: 0.4 MG/DL (ref 0–1.2)
BUN BLDV-MCNC: 11 MG/DL (ref 8–23)
CALCIUM SERPL-MCNC: 9.3 MG/DL (ref 8.6–10.2)
CHLORIDE BLD-SCNC: 103 MEQ/L (ref 98–107)
CHOLESTEROL, TOTAL: 193 MG/DL (ref 0–199)
CO2: 25 MEQ/L (ref 22–29)
CREAT SERPL-MCNC: 0.47 MG/DL (ref 0.5–0.9)
GFR AFRICAN AMERICAN: >60
GFR NON-AFRICAN AMERICAN: >60
GLOBULIN: 3.6 G/DL (ref 2.3–3.5)
GLUCOSE BLD-MCNC: 84 MG/DL (ref 74–109)
HBA1C MFR BLD: 5.7 % (ref 4.8–5.9)
HCT VFR BLD CALC: 40.4 % (ref 37–47)
HDLC SERPL-MCNC: 67 MG/DL (ref 40–59)
HEMOGLOBIN: 13.6 G/DL (ref 12–16)
LDL CHOLESTEROL CALCULATED: 113 MG/DL (ref 0–129)
MCH RBC QN AUTO: 28.1 PG (ref 27–31.3)
MCHC RBC AUTO-ENTMCNC: 33.6 % (ref 33–37)
MCV RBC AUTO: 83.8 FL (ref 82–100)
PDW BLD-RTO: 14.4 % (ref 11.5–14.5)
PLATELET # BLD: 293 K/UL (ref 130–400)
POTASSIUM SERPL-SCNC: 4 MEQ/L (ref 3.5–5.1)
RBC # BLD: 4.82 M/UL (ref 4.2–5.4)
SODIUM BLD-SCNC: 141 MEQ/L (ref 132–144)
TOTAL PROTEIN: 7.6 G/DL (ref 6.4–8.1)
TRIGL SERPL-MCNC: 64 MG/DL (ref 0–200)
WBC # BLD: 5.7 K/UL (ref 4.8–10.8)

## 2018-08-13 PROCEDURE — 1036F TOBACCO NON-USER: CPT | Performed by: FAMILY MEDICINE

## 2018-08-13 PROCEDURE — 1123F ACP DISCUSS/DSCN MKR DOCD: CPT | Performed by: FAMILY MEDICINE

## 2018-08-13 PROCEDURE — 1090F PRES/ABSN URINE INCON ASSESS: CPT | Performed by: FAMILY MEDICINE

## 2018-08-13 PROCEDURE — G8400 PT W/DXA NO RESULTS DOC: HCPCS | Performed by: FAMILY MEDICINE

## 2018-08-13 PROCEDURE — 3017F COLORECTAL CA SCREEN DOC REV: CPT | Performed by: FAMILY MEDICINE

## 2018-08-13 PROCEDURE — G0009 ADMIN PNEUMOCOCCAL VACCINE: HCPCS | Performed by: FAMILY MEDICINE

## 2018-08-13 PROCEDURE — 1101F PT FALLS ASSESS-DOCD LE1/YR: CPT | Performed by: FAMILY MEDICINE

## 2018-08-13 PROCEDURE — 90670 PCV13 VACCINE IM: CPT | Performed by: FAMILY MEDICINE

## 2018-08-13 PROCEDURE — 4040F PNEUMOC VAC/ADMIN/RCVD: CPT | Performed by: FAMILY MEDICINE

## 2018-08-13 PROCEDURE — G8417 CALC BMI ABV UP PARAM F/U: HCPCS | Performed by: FAMILY MEDICINE

## 2018-08-13 PROCEDURE — G8427 DOCREV CUR MEDS BY ELIG CLIN: HCPCS | Performed by: FAMILY MEDICINE

## 2018-08-13 PROCEDURE — 99214 OFFICE O/P EST MOD 30 MIN: CPT | Performed by: FAMILY MEDICINE

## 2018-08-13 NOTE — PROGRESS NOTES
 DTaP/Tdap/Td vaccine (1 - Tdap) 01/25/1972    Shingles Vaccine (1 of 2 - 2 Dose Series) 01/25/2003    Pneumococcal low/med risk (1 of 2 - PCV13) 01/25/2018    A1C test (Diabetic or Prediabetic)  02/12/2018    DEXA (modify frequency per FRAX score)  08/13/2019 (Originally 1/25/2018)    Flu vaccine (1) 09/01/2018    Potassium monitoring  01/08/2019    Creatinine monitoring  01/08/2019    Colon cancer screen colonoscopy  04/15/2019    Breast cancer screen  07/17/2019    Lipid screen  01/08/2023       No results found for this visit on 08/13/18. Objective    Vitals:    08/13/18 1321   BP: 122/64   Pulse: 76   Resp: 16   Temp: 98.1 °F (36.7 °C)   TempSrc: Oral   Weight: 219 lb (99.3 kg)   Height: 5' 2\" (1.575 m)       PHYSICAL EXAMINATION:        GENERAL:    The patient appears well nourished and well-developed,     Normal affect. Not appearing significantly anxious or depressed. No acute respiratory distress. Alert and oriented times 3. Skin:     No skin rashes. No concerning moles observed. Gait:    Normal gait. No ataxia. HEENT:  Normocephalic, atraumatic. Throat:  Pharynx is clear, no erythema/ edema or exudates   Ears:    TMs normal bilaterally. Canals and ears normal   Eyes:  Extraocular eye motions intact and pain free. Pupils reactive/equal    Sclerae and conjunctivae clear    NECK: No masses or adenopathy palpable. No carotid bruits heard. No asymmetry visible. No thyromegaly. RESPIRATORY:   Clear/ Equal breath sounds /No acute respiratory distress. No wheezes,rales, or rhonchi. No percussive abnormalities    HEART: Regular rhythm without murmur, rub or gallop. ABDOMEN: overwt  Soft, non tender. No masses, guarding or rebound. Normo active bowel sounds. EXTREMITIES:  No edema in any extremity. No cyanosis or clubbing. 2+ dorsalis pedis pulses bilaterally          Assessment & Plan    Diagnosis Orders   1.  Mixed hyperlipidemia  Lipid Panel    CBC    Comprehensive Metabolic Panel   2. Essential hypertension  Lipid Panel    CBC    Comprehensive Metabolic Panel   3. Encounter for screening mammogram for breast cancer  MYCHAL DIGITAL SCREEN W CAD BILATERAL   4. Other osteoarthritis of spine, cervical region     5. Hyperglycemia  Hemoglobin A1C     No orders of the defined types were placed in this encounter. No orders of the defined types were placed in this encounter. There are no discontinued medications. No Follow-up on file. Sima Zuniga is advised to follow up ASAP if condition deteriorates or problems arise and if no information on test results to patient in the next 1 month they are advised to call us.    Diet and exercise counseling  NO sxs while exercising -always positional on L neck shoulder    Simeon Singh MD

## 2018-08-14 ENCOUNTER — HOSPITAL ENCOUNTER (OUTPATIENT)
Dept: PHYSICAL THERAPY | Age: 63
Setting detail: THERAPIES SERIES
Discharge: HOME | End: 2018-08-14
Payer: MEDICARE

## 2018-08-14 NOTE — PROGRESS NOTES
[address]       Physical Therapy  Cancellation/No-show Note  Patient Name:  Keya Queen  :  1955   Date:  2018  Referring Practitioner: Zhang Gomez MD  Diagnosis: Cervical radiculopathy    Visit Information:  PT Visit Information  Onset Date:  (1-2 months ago)  PT Insurance Information: Medical [place]  Total # of Visits Approved:  (based off medical necessity, term date 18)  Total # of Visits to Date: 3  No Show: 2  Canceled Appointment: 0  Progress Note Counter: -pt NS    For today's appointment patient:  []  Cancelled  []  Rescheduled appointment  [x]  No-show   []  Called pt to remind of next appointment     Reason given by patient:  []  Patient ill  []  Conflicting appointment  []  No transportation    []  Conflict with work  []  No reason given  []  Other:       Comments:  Pt called 10 min after appt time to cancel stating that she just woke up.      Signature: Electronically signed by Brijesh Coley PT on 18 at 9:30 AM

## 2018-08-16 ENCOUNTER — HOSPITAL ENCOUNTER (OUTPATIENT)
Dept: PHYSICAL THERAPY | Age: 63
Setting detail: THERAPIES SERIES
Discharge: HOME | End: 2018-08-16
Payer: MEDICARE

## 2018-08-16 PROCEDURE — 97035 APP MDLTY 1+ULTRASOUND EA 15: CPT

## 2018-08-16 PROCEDURE — 97110 THERAPEUTIC EXERCISES: CPT

## 2018-08-16 ASSESSMENT — PAIN DESCRIPTION - DESCRIPTORS: DESCRIPTORS: ACHING

## 2018-08-16 ASSESSMENT — PAIN DESCRIPTION - LOCATION: LOCATION: SHOULDER

## 2018-08-16 ASSESSMENT — PAIN DESCRIPTION - PAIN TYPE: TYPE: CHRONIC PAIN

## 2018-08-16 ASSESSMENT — PAIN SCALES - GENERAL: PAINLEVEL_OUTOF10: 5

## 2018-08-16 ASSESSMENT — PAIN DESCRIPTION - ORIENTATION: ORIENTATION: LEFT

## 2018-08-16 NOTE — PROGRESS NOTES
[address] [address]  Outpatient Physical Therapy    Treatment Note        Date: 2018  Patient: Papi Sargent  : 1955  ACCT #: [de-identified]  Referring Practitioner: Hermann Dietz MD  Diagnosis: Cervical radiculopathy    Visit Information:  PT Visit Information  PT Insurance Information: Medical [place]  Total # of Visits to Date: 3  No Show: 2  Canceled Appointment: 0  Progress Note Counter: 3/12    Subjective: Pt reporting 5/10 pain in Lt shldr, denies radicular sx's currently though last felt tingling in hands yesterday. Pt reports immediate relieve following cerv side bends. Comments: Arrived late for scheduled appt- 8 min   HEP Compliance:  [x] Good [] Fair [] Poor [] Reports not doing due to:    Vital Signs  Patient Currently in Pain: Yes   Pain Screening  Patient Currently in Pain: Yes  Pain Assessment  Pain Assessment: 0-10  Pain Level: 5  Pain Type: Chronic pain  Pain Location: Shoulder  Pain Orientation: Left  Pain Descriptors: Aching    OBJECTIVE:   Exercises  Exercise 1: seated chin tucks 5\"x10   Exercise 2: pulleys x 4 min   Exercise 4: upper trap stretch L 20\"x3 wihtout overpressure   Exercise 5: cervical ROM x10 except L SB d/t increased L UE sx's   Exercise 6: scapular retraction 5\"x15   Exercise 7: table slides shoulder abduction Lt 5\"x10     Strength: [x] NT  [] MMT completed:     ROM: [x] NT  [] ROM measurements:    Modalities:  Modalities  Moist heat: x10 min to cervical spine seated to deacrease pain and mm tightness     *Indicates exercise, modality, or manual techniques to be initiated when appropriate    Assessment: Body structures, Functions, Activity limitations: Decreased ROM, Decreased strength, Decreased endurance, Decreased sensation  Assessment: Performed chin tucks in sitting vs supine w/ good za though pt req VCing to avoid cerv protraction w/ return to neutral. Pt denies onset of sx's or inc in pain during session.  Initiated US to [facility] in order to address muscle tightness. Concluded tx w/ HP to LT UT/neck. Treatment Diagnosis: cervical pain, decreased sensation, decreased UE strength  Prognosis: Good     Goals:  Short term goals  Time Frame for Short term goals: 4 weeks  Short term goal 1: Patient will report 50% reduction in frequency of radicular symptoms in left UE. Short term goal 2: Patient will be independent with HEP. Long term goals  Time Frame for Long term goals : 6 weeks  Long term goal 1: Patient will increase cervical ROM and left shoulder ROM to [facility] for improved functional tolerance. Long term goal 2: Patient will increase left UE strength >/= 4/5 for improve lifting tolerance. Long term goal 3: Patient will demonstrates improved postural awareness without verbal cues. Long term goal 4: NDI </= 6/50 to demonstrate improved functional tolerance. Progress toward goals: Lt UE strength, cerv/ Lt shldr ROM. POST-PAIN       Pain Rating (0-10 pain scale):   3/10   Location and pain description same as pre-treatment unless indicated. Action: [] NA   [x] Perform HEP  [] Meds as prescribed  [] Modalities as prescribed   [] Call Physician     Frequency/Duration:  Plan  Times per week: 2  Plan weeks: 6 weeks  Current Treatment Recommendations: Strengthening, ROM, Endurance Training, Neuromuscular Re-education, Manual Therapy - Soft Tissue Mobilization, Manual Therapy - Joint Manipulation, Home Exercise Program, Safety Education & Training, Patient/Caregiver Education & Training, Modalities, Integrated Dry Needling  Plan Comment: transfer care to [facility] PT     Pt to continue current HEP. See objective section for any therapeutic exercise changes, additions or modifications this date.     PT Individual Minutes  Time In: 1050  Time Out: 1130  Minutes: 40  Timed Code Treatment Minutes: 30 Minutes  Procedure Minutes: 10 min     Signature:  Electronically signed by Tala Montoya PTA on 8/16/18 at 11:53 AM

## 2018-08-21 ENCOUNTER — HOSPITAL ENCOUNTER (OUTPATIENT)
Dept: PHYSICAL THERAPY | Age: 63
Setting detail: THERAPIES SERIES
Discharge: HOME | End: 2018-08-21
Payer: MEDICARE

## 2018-08-21 PROCEDURE — 97110 THERAPEUTIC EXERCISES: CPT

## 2018-08-21 PROCEDURE — 97035 APP MDLTY 1+ULTRASOUND EA 15: CPT

## 2018-08-21 ASSESSMENT — PAIN SCALES - GENERAL: PAINLEVEL_OUTOF10: 5

## 2018-08-21 ASSESSMENT — PAIN DESCRIPTION - ORIENTATION: ORIENTATION: LEFT

## 2018-08-21 ASSESSMENT — PAIN DESCRIPTION - DESCRIPTORS: DESCRIPTORS: ACHING

## 2018-08-21 ASSESSMENT — PAIN DESCRIPTION - PAIN TYPE: TYPE: CHRONIC PAIN

## 2018-08-21 ASSESSMENT — PAIN DESCRIPTION - LOCATION: LOCATION: SHOULDER

## 2018-08-21 NOTE — PROGRESS NOTES
[address] [address]  Outpatient Physical Therapy    Treatment Note        Date: 2018  Patient: Ankush Cazares  : 1955  ACCT #: [de-identified]  Referring Practitioner: Aparna Goddard MD  Diagnosis: Cervical radiculopathy    Visit Information:  PT Visit Information  PT Insurance Information: Medical [place]  Total # of Visits to Date: 4  No Show: 2  Canceled Appointment: 0  Progress Note Counter:     Subjective: Pt reports decreased frequency of N/T symptoms and is able to control with HEP. Reports having significant relief following U/S lats visit. Comments: Arrived late for scheduled appt- 10 min   HEP Compliance:  [x] Good [] Fair [] Poor [] Reports not doing due to:    Vital Signs  Patient Currently in Pain: Yes   Pain Screening  Patient Currently in Pain: Yes  Pain Assessment  Pain Assessment: 0-10  Pain Level: 5  Pain Type: Chronic pain  Pain Location: Shoulder  Pain Orientation: Left  Pain Descriptors: Aching    OBJECTIVE:   Exercises  Exercise 1: seated chin tucks 5\"x10   Exercise 2: pulleys x 4 min- UBE NV*  Exercise 4: upper trap stretch L 20\"x3 wihtout overpressure   Exercise 5: cervical ROM x10 except L SB d/t increased L UE sx's   Exercise 6: scapular retraction 5\"x15   Exercise 7: table slides shoulder abduction Lt 5\"x10   Exercise 8: tband rows/lats*  Exercise 9: chest pulls*   Exercise 10: tband B ER*   Exercise 20: HEP: cont current     Strength: [x] NT  [] MMT completed:    ROM: [] NT  [x] ROM measurements:  AROM LUE (degrees)  L Shoulder ABduction 0-180: 160  L Shoulder Int Rotation  0-70: T12   Spine  Cervical: flex 56, ext 45, SB R 38 L 25 Rot B 70     Modalities:  Modalities  Moist heat: x10 min to cervical spine seated to deacrease pain and mm tightness  Ultrasound:  left upper trap for decreasing tightness 1 MHz, 1.2 W/cm2, 6 minutes     *Indicates exercise, modality, or manual techniques to be initiated when appropriate    Assessment:    Body structures, Functions, Activity limitations: Decreased ROM, Decreased strength, Decreased endurance, Decreased sensation  Assessment: Pt with significant improvement in L shoulder arom and cervical aorm however conts to be min limited with cervical arom with icnreased radicular sx's with L SB. Good za to tx however progressions limited d/t time constraints. Treatment Diagnosis: cervical pain, decreased sensation, decreased UE strength  Prognosis: Good     Goals:  Short term goals  Time Frame for Short term goals: 4 weeks  Short term goal 1: Patient will report 50% reduction in frequency of radicular symptoms in left UE. Short term goal 2: Patient will be independent with HEP. Long term goals  Time Frame for Long term goals : 6 weeks  Long term goal 1: Patient will increase cervical ROM and left shoulder ROM to [facility] for improved functional tolerance. Long term goal 2: Patient will increase left UE strength >/= 4/5 for improve lifting tolerance. Long term goal 3: Patient will demonstrates improved postural awareness without verbal cues. Long term goal 4: NDI </= 6/50 to demonstrate improved functional tolerance. Progress toward goals: good    POST-PAIN       Pain Rating (0-10 pain scale):  2 /10   Location and pain description same as pre-treatment unless indicated. Action: [] NA   [x] Perform HEP  [x] Meds as prescribed  [x] Modalities as prescribed   [] Call Physician     Frequency/Duration:  Plan  Times per week: 2  Plan weeks: 6 weeks  Current Treatment Recommendations: Strengthening, ROM, Endurance Training, Neuromuscular Re-education, Manual Therapy - Soft Tissue Mobilization, Manual Therapy - Joint Manipulation, Home Exercise Program, Safety Education & Training, Patient/Caregiver Education & Training, Modalities, Integrated Dry Needling  Plan Comment: transfer care to Pauline Garcia PT     Pt to continue current HEP. See objective section for any therapeutic exercise changes, additions or modifications this date.     PT

## 2018-08-23 ENCOUNTER — HOSPITAL ENCOUNTER (OUTPATIENT)
Dept: PHYSICAL THERAPY | Age: 63
Setting detail: THERAPIES SERIES
Discharge: HOME | End: 2018-08-23
Payer: MEDICARE

## 2018-08-23 PROCEDURE — 97035 APP MDLTY 1+ULTRASOUND EA 15: CPT

## 2018-08-23 PROCEDURE — 97110 THERAPEUTIC EXERCISES: CPT

## 2018-08-23 ASSESSMENT — PAIN DESCRIPTION - DESCRIPTORS: DESCRIPTORS: ACHING

## 2018-08-23 ASSESSMENT — PAIN DESCRIPTION - ORIENTATION: ORIENTATION: LEFT

## 2018-08-23 ASSESSMENT — PAIN DESCRIPTION - LOCATION: LOCATION: SHOULDER

## 2018-08-23 ASSESSMENT — PAIN DESCRIPTION - PAIN TYPE: TYPE: CHRONIC PAIN

## 2018-08-23 ASSESSMENT — PAIN SCALES - GENERAL: PAINLEVEL_OUTOF10: 4

## 2018-08-23 NOTE — PROGRESS NOTES
[address] [address]  Outpatient Physical Therapy    Treatment Note        Date: 2018  Patient: Adonis Najjar  : 1955  ACCT #: [de-identified]  Referring Practitioner: Rc Mireles MD  Diagnosis: Cervical radiculopathy    Visit Information:  PT Visit Information  PT Insurance Information: Medical [place]  Total # of Visits to Date: 5  No Show: 2  Canceled Appointment: 1  Progress Note Counter:  (R/S for later time in PM)    Subjective: Pt reporting 4/10 pain level currently in Lt shldr. Nothing new to report since last visit. HEP Compliance:  [x] Good [] Fair [] Poor [] Reports not doing due to:    Vital Signs  Patient Currently in Pain: Yes   Pain Screening  Patient Currently in Pain: Yes  Pain Assessment  Pain Level: 4  Pain Type: Chronic pain  Pain Location: Shoulder  Pain Orientation: Left  Pain Descriptors: Aching    OBJECTIVE:   Exercises  Exercise 1: seated chin tucks 5\"x15  Exercise 2: UBE L1.5  (2.5' F/R)  Exercise 4: upper trap stretch L 20\"x3 wihtout overpressure   Exercise 5: cervical ROM x10 except L SB d/t increased L UE sx's   Exercise 6: scapular retraction 5\"x15   Exercise 7: table slides shoulder flex/ abduction Lt 5\"x10   Exercise 8: tband rows/lats YTB x10 ea   Exercise 9: chest pulls (neutral) seated (w/ focus on posture)   Exercise 10: tband B ER x10 seated   Exercise 20: HEP: lats/rows     Strength: [x] NT  [] MMT completed:     ROM: [x] NT  [] ROM measurements:    Modalities:  Modalities  Moist heat: x10 min to cervical spine seated to deacrease pain and mm tightness  Ultrasound:  left upper trap for decreasing tightness 1 MHz, 1.2 W/cm2, 6 minutes     *Indicates exercise, modality, or manual techniques to be initiated when appropriate    Assessment:     Body structures, Functions, Activity limitations: Decreased ROM, Decreased strength, Decreased endurance, Decreased sensation  Assessment: Added TB chest pulls, lats/rows and B ER to progress w/ postural strengthening, overall good za. Pt denies onset of radicular sx's into UE's throughout. Cont'd w/ UT and HP to Lt UT. Treatment Diagnosis: cervical pain, decreased sensation, decreased UE strength  Prognosis: Good    Goals:  Short term goals  Time Frame for Short term goals: 4 weeks  Short term goal 1: Patient will report 50% reduction in frequency of radicular symptoms in left UE. Short term goal 2: Patient will be independent with HEP. Long term goals  Time Frame for Long term goals : 6 weeks  Long term goal 1: Patient will increase cervical ROM and left shoulder ROM to [facility] for improved functional tolerance. Long term goal 2: Patient will increase left UE strength >/= 4/5 for improve lifting tolerance. Long term goal 3: Patient will demonstrates improved postural awareness without verbal cues. Long term goal 4: NDI </= 6/50 to demonstrate improved functional tolerance. Progress toward goals:Lt UE strength, cerv ROM. POST-PAIN       Pain Rating (0-10 pain scale):   2/10   Location and pain description same as pre-treatment unless indicated. Action: [] NA   [x] Perform HEP  [] Meds as prescribed  [] Modalities as prescribed   [] Call Physician     Frequency/Duration:  Plan  Times per week: 2  Plan weeks: 6 weeks  Current Treatment Recommendations: Strengthening, ROM, Endurance Training, Neuromuscular Re-education, Manual Therapy - Soft Tissue Mobilization, Manual Therapy - Joint Manipulation, Home Exercise Program, Safety Education & Training, Patient/Caregiver Education & Training, Modalities, Integrated Dry Needling  Plan Comment: transfer care to [facility] PT     Pt to continue current HEP. See objective section for any therapeutic exercise changes, additions or modifications this date.     PT Individual Minutes  Time In: 0718  Time Out: 6733  Minutes: 48  Timed Code Treatment Minutes: 38 Minutes  Procedure Minutes: 10 min   Signature:  Electronically signed by Tash Tatum PTA on 8/23/18

## 2018-08-28 ENCOUNTER — HOSPITAL ENCOUNTER (OUTPATIENT)
Dept: PHYSICAL THERAPY | Age: 63
Setting detail: THERAPIES SERIES
Discharge: HOME | End: 2018-08-28
Payer: MEDICARE

## 2018-08-28 PROCEDURE — 97035 APP MDLTY 1+ULTRASOUND EA 15: CPT

## 2018-08-28 PROCEDURE — 97110 THERAPEUTIC EXERCISES: CPT

## 2018-08-28 NOTE — PROGRESS NOTES
[address] [address]  Outpatient Physical Therapy    Treatment Note        Date: 2018  Patient: Chan Florence  : 1955  ACCT #: [de-identified]  Referring Practitioner: López Lozada MD  Diagnosis: Cervical radiculopathy    Visit Information:  PT Visit Information  PT Insurance Information: Medical [place]  Total # of Visits to Date: 6  No Show: 2  Canceled Appointment: 1  Progress Note Counter:      Subjective: Pt denies having pain currently though states tingling sensation felt in fingertips on Lt hand earlier this morning. Pt states \"I think it's the way I was laying, it when away when I changed position. \"      HEP Compliance:  [x] Good [] Fair [] Poor [] Reports not doing due to:    Vital Signs  Patient Currently in Pain: Denies   Pain Screening  Patient Currently in Pain: Denies    OBJECTIVE:   Exercises  Exercise 1: seated chin tucks 5\"x15  Exercise 2: UBE L2  (2.5' F/R)  Exercise 4: upper trap stretch L 20\"x3 wihtout overpressure   Exercise 5: cervical ROM x10 except L SB d/t increased L UE sx's   Exercise 6: scapular retraction 5\"x20  Exercise 7: Wall slides 5\"x10 flex/ABD   Exercise 8: tband rows/lats YTB 2 x10 ea   Exercise 9: chest pulls (neutral) seated (w/ focus on posture), YTB x10, ABD w IR/ER NV*  Exercise 10: tband B ER 2x10 seated     Strength: [] NT  [x] MMT completed:   Strength LUE  L Shoulder Flexion: 4-/5, 4/5  L Shoulder Extension: 4+/5  L Shoulder ABduction: 4/5  L Shoulder Internal Rotation: 4+/5  L Shoulder External Rotation: 4/5  L Elbow Flexion: 4+/5  L Elbow Extension: 4+/5     ROM: [] NT  [] ROM measurements:     Modalities:  Modalities  Moist heat: x10 min to cervical spine seated to deacrease pain and mm tightness  Ultrasound:  left upper trap for decreasing tightness 1 MHz, 1.2 W/cm2, 6 minutes     *Indicates exercise, modality, or manual techniques to be initiated when appropriate    Assessment:     Body structures, Functions, Activity limitations: Decreased ROM, Decreased strength, Decreased endurance, Decreased sensation  Assessment: Progressed table slides to wall w/ good za. Inc Lt UE strength overall w/ MMTing. Treatment Diagnosis: cervical pain, decreased sensation, decreased UE strength  Prognosis: Good     Goals:  Short term goals  Time Frame for Short term goals: 4 weeks  Short term goal 1: Patient will report 50% reduction in frequency of radicular symptoms in left UE. Short term goal 2: Patient will be independent with HEP. Long term goals  Time Frame for Long term goals : 6 weeks  Long term goal 1: Patient will increase cervical ROM and left shoulder ROM to [facility] for improved functional tolerance. Long term goal 2: Patient will increase left UE strength >/= 4/5 for improve lifting tolerance. Long term goal 3: Patient will demonstrates improved postural awareness without verbal cues. Long term goal 4: NDI </= 6/50 to demonstrate improved functional tolerance. Progress toward goals: cerv and Lt UE ROM. Lt UE strength     POST-PAIN       Pain Rating (0-10 pain scale):   0/10   Location and pain description same as pre-treatment unless indicated. Action: [] NA   [] Perform HEP  [] Meds as prescribed  [] Modalities as prescribed   [] Call Physician     Frequency/Duration:  Plan  Times per week: 2  Plan weeks: 6 weeks  Current Treatment Recommendations: Strengthening, ROM, Endurance Training, Neuromuscular Re-education, Manual Therapy - Soft Tissue Mobilization, Manual Therapy - Joint Manipulation, Home Exercise Program, Safety Education & Training, Patient/Caregiver Education & Training, Modalities, Integrated Dry Needling  Plan Comment: transfer care to [facility] PT     Pt to continue current HEP. See objective section for any therapeutic exercise changes, additions or modifications this date.      PT Individual Minutes  Time In: 2521  Time Out: 6214  Minutes: 48  Timed Code Treatment Minutes: 38 Minutes  Procedure Minutes: 10 min     Signature:

## 2018-08-30 ENCOUNTER — HOSPITAL ENCOUNTER (OUTPATIENT)
Dept: WOMENS IMAGING | Age: 63
Discharge: HOME | End: 2018-09-01
Payer: MEDICARE

## 2018-08-30 ENCOUNTER — HOSPITAL ENCOUNTER (OUTPATIENT)
Dept: PHYSICAL THERAPY | Age: 63
Setting detail: THERAPIES SERIES
Discharge: HOME | End: 2018-08-30
Payer: MEDICARE

## 2018-08-30 DIAGNOSIS — Z12.31 ENCOUNTER FOR SCREENING MAMMOGRAM FOR BREAST CANCER: ICD-10-CM

## 2018-08-30 PROCEDURE — 77067 SCR MAMMO BI INCL CAD: CPT

## 2018-08-30 PROCEDURE — 97110 THERAPEUTIC EXERCISES: CPT

## 2018-08-30 PROCEDURE — 97140 MANUAL THERAPY 1/> REGIONS: CPT

## 2018-09-04 ENCOUNTER — HOSPITAL ENCOUNTER (OUTPATIENT)
Dept: PHYSICAL THERAPY | Age: 63
Setting detail: THERAPIES SERIES
Discharge: HOME | End: 2018-09-04
Payer: MEDICARE

## 2018-09-04 NOTE — PROGRESS NOTES
[address]       Physical Therapy  Cancellation/No-show Note  Patient Name:  Ankush Cazares  :  1955   Date:  2018  Referring Practitioner: Aparna Goddard MD  Diagnosis: Cervical radiculopathy    Visit Information:  PT Visit Information  PT Insurance Information: Medical [place]  Total # of Visits to Date: 7  No Show: 2  Canceled Appointment: 2  Progress Note Counter:  (CX 18)     For today's appointment patient:  [x]  Cancelled  []  Rescheduled appointment  []  No-show   []  Called pt to remind of next appointment     Reason given by patient:  []  Patient ill  []  Conflicting appointment  []  No transportation    []  Conflict with work  []  No reason given  [x]  Other:       Comments:   Death in family.      Signature: Electronically signed by Michael Leroy PTA on 18 at 10:23 AM

## 2018-09-06 ENCOUNTER — HOSPITAL ENCOUNTER (OUTPATIENT)
Dept: PHYSICAL THERAPY | Age: 63
Setting detail: THERAPIES SERIES
Discharge: HOME | End: 2018-09-06
Payer: MEDICARE

## 2018-09-06 PROCEDURE — G0283 ELEC STIM OTHER THAN WOUND: HCPCS

## 2018-09-06 PROCEDURE — 97110 THERAPEUTIC EXERCISES: CPT

## 2018-09-11 ENCOUNTER — HOSPITAL ENCOUNTER (OUTPATIENT)
Dept: PHYSICAL THERAPY | Age: 63
Setting detail: THERAPIES SERIES
Discharge: HOME | End: 2018-09-11
Payer: MEDICARE

## 2018-09-13 ENCOUNTER — HOSPITAL ENCOUNTER (OUTPATIENT)
Dept: PHYSICAL THERAPY | Age: 63
Setting detail: THERAPIES SERIES
Discharge: HOME | End: 2018-09-13
Payer: MEDICARE

## 2018-09-13 PROCEDURE — G0283 ELEC STIM OTHER THAN WOUND: HCPCS

## 2018-09-13 PROCEDURE — 97110 THERAPEUTIC EXERCISES: CPT

## 2018-09-13 PROCEDURE — 97140 MANUAL THERAPY 1/> REGIONS: CPT

## 2018-09-13 NOTE — PROGRESS NOTES
[address] [address]  Outpatient Physical Therapy    Treatment Note        Date: 2018  Patient: Keya Queen  : 1955  ACCT #: [de-identified]  Referring Practitioner: Zhang Gomez MD  Diagnosis: Cervical radiculopathy    Visit Information:  PT Visit Information  PT Insurance Information: Medical [place]  Total # of Visits to Date: 5  No Show: 2  Canceled Appointment: 3  Progress Note Counter:      Subjective: Pain denies pain currently. Nothing new to report since LV. Pt would like to be D/C'd at this time. Good compliance to HEP 1-2 times per day except this week D/T dealing w/ death in family.       HEP Compliance:  [x] Good [] Fair [] Poor [] Reports not doing due to:    Vital Signs  Patient Currently in Pain: Denies   Pain Screening  Patient Currently in Pain: Denies    OBJECTIVE:   Exercises  Exercise 1: Objective measures 10 min   Exercise 2: UBE L2.5  (2.5' F/R)  Exercise 4: upper trap stretch L 20\"x3 wihtout overpressure   Exercise 7: Wall slides 5\"x10 flex/ABD   Exercise 12: KT to Lt UT  Exercise 13: Caudal glides Lt 5\"x10     Strength: [] NT  [x] MMT completed:   Strength LUE  L Shoulder Flexion: 4/5  L Shoulder Extension: 5/5  L Shoulder ABduction: 4+/5  L Shoulder Internal Rotation: 4+/5 (5/5 delt in neutral)  L Shoulder External Rotation: 4-/5 (4/5 delt in neutral)  L Elbow Flexion: 5/5  L Elbow Extension: 4+/5     ROM: [] NT  [x] ROM measurements:   AROM LUE (degrees)  L Shoulder Flexion 0-180: Flex 160 deg (w/o compensation)   L Shoulder ABduction 0-180: 155 deg   L Shoulder Int Rotation  0-70: T8  L Shoulder Ext Rotation  0-90: T4, WFLs  Spine  Cervical: Flex 54 deg (w/o trunk compensation), Ext 60 deg, Rt SB 30 deg, Lt 45deg, Rot WFL's      Manual:   Manual therapy  Soft Tissue Mobalization: STM cervical paraspinals, upper trap, suboccitpitals B L>R x 8 min seated     Modalities:  Modalities  Moist heat: x10 min to cervical spine seated to deacrease pain and mm tightness  E-stim (parameters): IFC w/ HP to Lt UT/scap 10 min      *Indicates exercise, modality, or manual techniques to be initiated when appropriate    Assessment: Body structures, Functions, Activity limitations: Decreased ROM, Decreased strength, Decreased endurance, Decreased sensation  Assessment: Pt self reports feeling 90% normal function currently vs <10% upon starting PT. Pt express feeling less discomfort and inc ROM in Lt shldr w/ daily activities. Pts pain ranging from 0-2/10 recently. Pt denies having tingling sx's in Lt UE going on 2 wks. Overall improvements in Lt shldr/ cerv ROM and UE strength in most areas. Treatment Diagnosis: cervical pain, decreased sensation, decreased UE strength  Prognosis: Good     Goals:  Short term goals  Time Frame for Short term goals: 4 weeks  Short term goal 1: Patient will report 50% reduction in frequency of radicular symptoms in left UE. Short term goal 2: Patient will be independent with HEP. Long term goals  Time Frame for Long term goals : 6 weeks  Long term goal 1: Patient will increase cervical ROM and left shoulder ROM to [facility] for improved functional tolerance. Long term goal 2: Patient will increase left UE strength >/= 4/5 for improve lifting tolerance. Long term goal 3: Patient will demonstrates improved postural awareness without verbal cues. Long term goal 4: NDI </= 6/50 to demonstrate improved functional tolerance. Progress toward goals: Please refer to D/C note for details. POST-PAIN       Pain Rating (0-10 pain scale):   0/10   Location and pain description same as pre-treatment unless indicated. Action: [] NA   [] Perform HEP  [] Meds as prescribed  [] Modalities as prescribed   [] Call Physician     Frequency/Duration:  Plan  Plan Comment: D/C this date per pt. Pt to continue current HEP. See objective section for any therapeutic exercise changes, additions or modifications this date.     PT Individual Minutes  Time In:

## 2018-09-13 NOTE — DISCHARGE SUMMARY
term goal 3: Patient will demonstrates improved postural awareness without verbal cues. Good. Pt reports improved self awareness. [x] yes  [] no   Long term goal 4: NDI </= 6/50 to demonstrate improved functional tolerance. NDI= 11/50 or 22% disability  [] yes  [x] no       Body structures, Functions, Activity limitations: Decreased ROM, Decreased strength, Decreased endurance, Decreased sensation  Assessment: Pt self reports feeling 90% normal function currently vs <10% upon starting PT. Pt express feeling less discomfort and inc ROM in Lt shldr w/ daily activities. Pts pain ranging from 0-2/10 recently without UE radicular symptoms >/=2 weeks. Overall good progress towards all goals with meeting all except functional activity tolerance possibly d/t contd low pain levels. Pt is currently indep/compliant with hep and able to self manage symptoms at this time. Prognosis: Good  Discharge Recommendations: Defer PT at this time    PLAN:   Plan  Plan Comment: D/C this date per pt. ?     Patient Status:[] Continue/ Initiate plan of Care    [] Discharge PT. Recommend pt continue with HEP. [] Additional visits requested, Please re-certify for additional visits:        Signature: Electronically signed by Hina Acuna PT on 9/13/2018 at 12:36 PM  Objective measures taken by: Electronically signed by Rita Vieira PTA on 9/13/18 at 12:24 PM    If you have any questions or concerns, please don't hesitate to call. Thank you for your referral.    I have reviewed this plan of care and certify a need for medically necessary rehabilitation services.     Physician Signature:__________________________________________________________  Date:  Please sign and return

## 2019-05-03 ENCOUNTER — TELEPHONE (OUTPATIENT)
Dept: ADMINISTRATIVE | Age: 64
End: 2019-05-03

## 2019-05-03 ENCOUNTER — TELEPHONE (OUTPATIENT)
Dept: FAMILY MEDICINE CLINIC | Age: 64
End: 2019-05-03

## 2023-06-19 DIAGNOSIS — I10 ESSENTIAL (PRIMARY) HYPERTENSION: ICD-10-CM

## 2023-06-19 DIAGNOSIS — R73.9 HYPERGLYCEMIA, UNSPECIFIED: ICD-10-CM

## 2023-06-19 RX ORDER — AMLODIPINE BESYLATE 10 MG/1
TABLET ORAL
Qty: 90 TABLET | Refills: 0 | Status: SHIPPED | OUTPATIENT
Start: 2023-06-19 | End: 2023-09-20

## 2023-06-19 RX ORDER — METFORMIN HYDROCHLORIDE 500 MG/1
TABLET ORAL
Qty: 90 TABLET | Refills: 0 | Status: SHIPPED | OUTPATIENT
Start: 2023-06-19 | End: 2023-08-08

## 2023-07-20 PROBLEM — R73.9 HYPERGLYCEMIA: Status: ACTIVE | Noted: 2023-07-20

## 2023-07-20 PROBLEM — E78.2 MIXED HYPERLIPIDEMIA: Status: ACTIVE | Noted: 2023-07-20

## 2023-07-20 PROBLEM — I10 ESSENTIAL HYPERTENSION: Status: ACTIVE | Noted: 2023-07-20

## 2023-07-20 PROBLEM — F41.9 ANXIETY: Status: ACTIVE | Noted: 2023-07-20

## 2023-07-20 PROBLEM — D05.11 DUCTAL CARCINOMA IN SITU (DCIS) OF RIGHT BREAST: Status: ACTIVE | Noted: 2023-07-20

## 2023-07-27 ENCOUNTER — TELEPHONE (OUTPATIENT)
Dept: PRIMARY CARE | Facility: CLINIC | Age: 68
End: 2023-07-27
Payer: MEDICARE

## 2023-07-27 DIAGNOSIS — R73.9 HYPERGLYCEMIA: ICD-10-CM

## 2023-07-27 DIAGNOSIS — E78.2 MIXED HYPERLIPIDEMIA: ICD-10-CM

## 2023-07-27 DIAGNOSIS — I10 ESSENTIAL HYPERTENSION: ICD-10-CM

## 2023-07-27 NOTE — TELEPHONE ENCOUNTER
Patient has upcoming apt on 08/08 and would like labs done before apt.  Please place orders and notify patient when available.

## 2023-08-03 ENCOUNTER — LAB (OUTPATIENT)
Dept: LAB | Facility: LAB | Age: 68
End: 2023-08-03
Payer: MEDICARE

## 2023-08-03 DIAGNOSIS — R73.9 HYPERGLYCEMIA: ICD-10-CM

## 2023-08-03 DIAGNOSIS — E78.2 MIXED HYPERLIPIDEMIA: ICD-10-CM

## 2023-08-03 DIAGNOSIS — I10 ESSENTIAL HYPERTENSION: ICD-10-CM

## 2023-08-03 PROCEDURE — 80061 LIPID PANEL: CPT

## 2023-08-03 PROCEDURE — 83036 HEMOGLOBIN GLYCOSYLATED A1C: CPT

## 2023-08-03 PROCEDURE — 36415 COLL VENOUS BLD VENIPUNCTURE: CPT

## 2023-08-03 PROCEDURE — 80053 COMPREHEN METABOLIC PANEL: CPT

## 2023-08-03 PROCEDURE — 85027 COMPLETE CBC AUTOMATED: CPT

## 2023-08-04 LAB
ALANINE AMINOTRANSFERASE (SGPT) (U/L) IN SER/PLAS: 21 U/L (ref 7–45)
ALBUMIN (G/DL) IN SER/PLAS: 3.9 G/DL (ref 3.4–5)
ALKALINE PHOSPHATASE (U/L) IN SER/PLAS: 174 U/L (ref 33–136)
ANION GAP IN SER/PLAS: 14 MMOL/L (ref 10–20)
ASPARTATE AMINOTRANSFERASE (SGOT) (U/L) IN SER/PLAS: 15 U/L (ref 9–39)
BILIRUBIN TOTAL (MG/DL) IN SER/PLAS: 0.5 MG/DL (ref 0–1.2)
CALCIUM (MG/DL) IN SER/PLAS: 10.2 MG/DL (ref 8.6–10.6)
CARBON DIOXIDE, TOTAL (MMOL/L) IN SER/PLAS: 28 MMOL/L (ref 21–32)
CHLORIDE (MMOL/L) IN SER/PLAS: 105 MMOL/L (ref 98–107)
CHOLESTEROL (MG/DL) IN SER/PLAS: 130 MG/DL (ref 0–199)
CHOLESTEROL IN HDL (MG/DL) IN SER/PLAS: 50.6 MG/DL
CHOLESTEROL/HDL RATIO: 2.6
CREATININE (MG/DL) IN SER/PLAS: 0.33 MG/DL (ref 0.5–1.05)
ERYTHROCYTE DISTRIBUTION WIDTH (RATIO) BY AUTOMATED COUNT: 12.9 % (ref 11.5–14.5)
ERYTHROCYTE MEAN CORPUSCULAR HEMOGLOBIN CONCENTRATION (G/DL) BY AUTOMATED: 32.2 G/DL (ref 32–36)
ERYTHROCYTE MEAN CORPUSCULAR VOLUME (FL) BY AUTOMATED COUNT: 77 FL (ref 80–100)
ERYTHROCYTES (10*6/UL) IN BLOOD BY AUTOMATED COUNT: 5.42 X10E12/L (ref 4–5.2)
ESTIMATED AVERAGE GLUCOSE FOR HBA1C: 100 MG/DL
GFR FEMALE: >90 ML/MIN/1.73M2
GLUCOSE (MG/DL) IN SER/PLAS: 77 MG/DL (ref 74–99)
HEMATOCRIT (%) IN BLOOD BY AUTOMATED COUNT: 41.9 % (ref 36–46)
HEMOGLOBIN (G/DL) IN BLOOD: 13.5 G/DL (ref 12–16)
HEMOGLOBIN A1C/HEMOGLOBIN TOTAL IN BLOOD: 5.1 %
LDL: 65 MG/DL (ref 0–99)
LEUKOCYTES (10*3/UL) IN BLOOD BY AUTOMATED COUNT: 4.6 X10E9/L (ref 4.4–11.3)
NRBC (PER 100 WBCS) BY AUTOMATED COUNT: 0 /100 WBC (ref 0–0)
PLATELETS (10*3/UL) IN BLOOD AUTOMATED COUNT: 245 X10E9/L (ref 150–450)
POTASSIUM (MMOL/L) IN SER/PLAS: 4.3 MMOL/L (ref 3.5–5.3)
PROTEIN TOTAL: 7 G/DL (ref 6.4–8.2)
SODIUM (MMOL/L) IN SER/PLAS: 143 MMOL/L (ref 136–145)
TRIGLYCERIDE (MG/DL) IN SER/PLAS: 74 MG/DL (ref 0–149)
UREA NITROGEN (MG/DL) IN SER/PLAS: 16 MG/DL (ref 6–23)
VLDL: 15 MG/DL (ref 0–40)

## 2023-08-08 ENCOUNTER — LAB (OUTPATIENT)
Dept: LAB | Facility: LAB | Age: 68
End: 2023-08-08
Payer: MEDICARE

## 2023-08-08 ENCOUNTER — OFFICE VISIT (OUTPATIENT)
Dept: PRIMARY CARE | Facility: CLINIC | Age: 68
End: 2023-08-08
Payer: MEDICARE

## 2023-08-08 VITALS
TEMPERATURE: 96.6 F | DIASTOLIC BLOOD PRESSURE: 74 MMHG | BODY MASS INDEX: 32.66 KG/M2 | HEART RATE: 76 BPM | SYSTOLIC BLOOD PRESSURE: 128 MMHG | RESPIRATION RATE: 16 BRPM | HEIGHT: 61 IN | OXYGEN SATURATION: 97 % | WEIGHT: 173 LBS

## 2023-08-08 DIAGNOSIS — R53.83 OTHER FATIGUE: ICD-10-CM

## 2023-08-08 DIAGNOSIS — R06.02 SHORTNESS OF BREATH: ICD-10-CM

## 2023-08-08 DIAGNOSIS — Z00.00 ENCOUNTER FOR MEDICARE ANNUAL WELLNESS EXAM: ICD-10-CM

## 2023-08-08 DIAGNOSIS — R74.8 ELEVATED ALKALINE PHOSPHATASE LEVEL: ICD-10-CM

## 2023-08-08 DIAGNOSIS — I10 ESSENTIAL HYPERTENSION: ICD-10-CM

## 2023-08-08 DIAGNOSIS — R79.89 ABNORMAL LIVER FUNCTION TESTS: ICD-10-CM

## 2023-08-08 DIAGNOSIS — R71.8 MICROCYTOSIS: ICD-10-CM

## 2023-08-08 DIAGNOSIS — R73.9 HYPERGLYCEMIA: ICD-10-CM

## 2023-08-08 DIAGNOSIS — E78.2 MIXED HYPERLIPIDEMIA: ICD-10-CM

## 2023-08-08 DIAGNOSIS — R01.1 HEART MURMUR: ICD-10-CM

## 2023-08-08 DIAGNOSIS — I49.9 CARDIAC ARRHYTHMIA, UNSPECIFIED CARDIAC ARRHYTHMIA TYPE: ICD-10-CM

## 2023-08-08 DIAGNOSIS — Z00.00 ROUTINE GENERAL MEDICAL EXAMINATION AT HEALTH CARE FACILITY: Primary | ICD-10-CM

## 2023-08-08 DIAGNOSIS — D05.11 DUCTAL CARCINOMA IN SITU (DCIS) OF RIGHT BREAST: ICD-10-CM

## 2023-08-08 DIAGNOSIS — F41.9 ANXIETY: ICD-10-CM

## 2023-08-08 DIAGNOSIS — E66.01 OBESITY, MORBID (MULTI): ICD-10-CM

## 2023-08-08 LAB
ALANINE AMINOTRANSFERASE (SGPT) (U/L) IN SER/PLAS: 25 U/L (ref 7–45)
ALBUMIN (G/DL) IN SER/PLAS: 3.9 G/DL (ref 3.4–5)
ALKALINE PHOSPHATASE (U/L) IN SER/PLAS: 171 U/L (ref 33–136)
ANION GAP IN SER/PLAS: 14 MMOL/L (ref 10–20)
ASPARTATE AMINOTRANSFERASE (SGOT) (U/L) IN SER/PLAS: 19 U/L (ref 9–39)
BILIRUBIN TOTAL (MG/DL) IN SER/PLAS: 0.5 MG/DL (ref 0–1.2)
CALCIUM (MG/DL) IN SER/PLAS: 10.3 MG/DL (ref 8.6–10.3)
CARBON DIOXIDE, TOTAL (MMOL/L) IN SER/PLAS: 27 MMOL/L (ref 21–32)
CHLORIDE (MMOL/L) IN SER/PLAS: 105 MMOL/L (ref 98–107)
COBALAMIN (VITAMIN B12) (PG/ML) IN SER/PLAS: 610 PG/ML (ref 211–911)
CREATININE (MG/DL) IN SER/PLAS: 0.39 MG/DL (ref 0.5–1.05)
GFR FEMALE: >90 ML/MIN/1.73M2
GLUCOSE (MG/DL) IN SER/PLAS: 89 MG/DL (ref 74–99)
POTASSIUM (MMOL/L) IN SER/PLAS: 3.7 MMOL/L (ref 3.5–5.3)
PROTEIN TOTAL: 7.3 G/DL (ref 6.4–8.2)
SODIUM (MMOL/L) IN SER/PLAS: 142 MMOL/L (ref 136–145)
UREA NITROGEN (MG/DL) IN SER/PLAS: 14 MG/DL (ref 6–23)

## 2023-08-08 PROCEDURE — 86663 EPSTEIN-BARR ANTIBODY: CPT

## 2023-08-08 PROCEDURE — 86665 EPSTEIN-BARR CAPSID VCA: CPT

## 2023-08-08 PROCEDURE — 82728 ASSAY OF FERRITIN: CPT

## 2023-08-08 PROCEDURE — 84080 ASSAY ALKALINE PHOSPHATASES: CPT

## 2023-08-08 PROCEDURE — 36415 COLL VENOUS BLD VENIPUNCTURE: CPT

## 2023-08-08 PROCEDURE — 84075 ASSAY ALKALINE PHOSPHATASE: CPT

## 2023-08-08 PROCEDURE — 99213 OFFICE O/P EST LOW 20 MIN: CPT | Performed by: FAMILY MEDICINE

## 2023-08-08 PROCEDURE — 1159F MED LIST DOCD IN RCRD: CPT | Performed by: FAMILY MEDICINE

## 2023-08-08 PROCEDURE — 82607 VITAMIN B-12: CPT

## 2023-08-08 PROCEDURE — 1036F TOBACCO NON-USER: CPT | Performed by: FAMILY MEDICINE

## 2023-08-08 PROCEDURE — G0439 PPPS, SUBSEQ VISIT: HCPCS | Performed by: FAMILY MEDICINE

## 2023-08-08 PROCEDURE — 93000 ELECTROCARDIOGRAM COMPLETE: CPT | Performed by: FAMILY MEDICINE

## 2023-08-08 PROCEDURE — 3078F DIAST BP <80 MM HG: CPT | Performed by: FAMILY MEDICINE

## 2023-08-08 PROCEDURE — 80053 COMPREHEN METABOLIC PANEL: CPT

## 2023-08-08 PROCEDURE — 1160F RVW MEDS BY RX/DR IN RCRD: CPT | Performed by: FAMILY MEDICINE

## 2023-08-08 PROCEDURE — 3074F SYST BP LT 130 MM HG: CPT | Performed by: FAMILY MEDICINE

## 2023-08-08 PROCEDURE — 86664 EPSTEIN-BARR NUCLEAR ANTIGEN: CPT

## 2023-08-08 PROCEDURE — 1170F FXNL STATUS ASSESSED: CPT | Performed by: FAMILY MEDICINE

## 2023-08-08 RX ORDER — FLUTICASONE PROPIONATE 50 MCG
SPRAY, SUSPENSION (ML) NASAL DAILY
COMMUNITY
Start: 2022-12-16 | End: 2023-08-08 | Stop reason: ALTCHOICE

## 2023-08-08 RX ORDER — ANASTROZOLE 1 MG/1
1 TABLET ORAL DAILY
COMMUNITY
End: 2024-01-29 | Stop reason: SDUPTHER

## 2023-08-08 RX ORDER — ATORVASTATIN CALCIUM 40 MG/1
1 TABLET, FILM COATED ORAL NIGHTLY
COMMUNITY
Start: 2018-04-07 | End: 2023-08-31

## 2023-08-08 RX ORDER — ASPIRIN 81 MG/1
81 TABLET ORAL
COMMUNITY
Start: 2017-02-13 | End: 2023-08-08 | Stop reason: ALTCHOICE

## 2023-08-08 ASSESSMENT — ACTIVITIES OF DAILY LIVING (ADL)
MANAGING_FINANCES: INDEPENDENT
DRESSING: INDEPENDENT
DOING_HOUSEWORK: INDEPENDENT
GROCERY_SHOPPING: INDEPENDENT
BATHING: INDEPENDENT
TAKING_MEDICATION: INDEPENDENT

## 2023-08-08 ASSESSMENT — PATIENT HEALTH QUESTIONNAIRE - PHQ9
2. FEELING DOWN, DEPRESSED OR HOPELESS: NOT AT ALL
SUM OF ALL RESPONSES TO PHQ9 QUESTIONS 1 AND 2: 0
1. LITTLE INTEREST OR PLEASURE IN DOING THINGS: NOT AT ALL

## 2023-08-08 ASSESSMENT — ENCOUNTER SYMPTOMS
LOSS OF SENSATION IN FEET: 0
DEPRESSION: 0
OCCASIONAL FEELINGS OF UNSTEADINESS: 0

## 2023-08-08 NOTE — PROGRESS NOTES
"Subjective   Reason for Visit: Marisol Weiner is an 68 y.o. female here for a Medicare Wellness visit.          Reviewed all medications by prescribing practitioner or clinical pharmacist (such as prescriptions, OTCs, herbal therapies and supplements) and documented in the medical record.    HPI    Patient Care Team:  Mona Emanuel MD as PCP - General  [facility] Emanuel [facility]     Review of Systems    Objective   Vitals:  /74   Pulse 76   Temp 35.9 °C (96.6 °F) (Temporal)   Resp 16   Ht 1.549 m (5' 1\")   Wt 78.5 kg (173 lb)   LMP 01/01/1990 (Approximate)   SpO2 97%   BMI 32.69 kg/m²       Physical Exam    Assessment/Plan   Problem List Items Addressed This Visit     Anxiety    Ductal carcinoma in situ (DCIS) of right breast    Essential hypertension    Hyperglycemia    Mixed hyperlipidemia   Other Visit Diagnoses     Routine general medical examination at health care facility    -  Primary    Encounter for Medicare annual wellness exam                 "

## 2023-08-08 NOTE — PROGRESS NOTES
Subjective   Reason for Visit: Marisol Weiner is a 68 y.o. female here for a Medicare Wellness visit.   Covid vax: x 3  CRC: due 2033  Mammogram: 7/2023  Pap: hysterectomy  Lmp: hysterectomy   had fatigue episode-went to er labs and cxr ok  Thought perhaps covid -test(-)  Much less fatigued    CHECKLIST REVIEWED AND COMPLETE FOR [facility]    Past Medical, Surgical, and Family History reviewed and updated in chart.    Reviewed all medications by prescribing practitioner or clinical pharmacist (such as prescriptions, OTCs, herbal therapies and supplements) and documented in the medical record.  Medicare Annual Wellness Visit Subsequent, Hyperglycemia, Hypertension, Hyperlipidemia, and Weight Loss  HPI    Patient Self Assessment of Health Status  Patient Self Assessment: Good    Nutrition and Exercise  Current Diet: HEALTHY Diet always best, minimizing excess carbs  Adequate Fluid Intake: Yes  Caffeine: -aware to minimize intake  Exercise Frequency: Regularly advised    Functional Ability/Level of Safety  Cognitive Impairment Observed: No cognitive impairment observed    Home Safety Risk Factors: None    Patient Care Team:  Mona Emanuel MD as PCP - General    HPI  Patient Active Problem List   Diagnosis    Anxiety    Ductal carcinoma in situ (DCIS) of right breast    Essential hypertension    Hyperglycemia    Mixed hyperlipidemia      Past Surgical History:   Procedure Laterality Date    BREAST BIOPSY Left 09/2020    fibroadenoma    BREAST BIOPSY Right 06/2021    ductal carcinoma in situ    COLONOSCOPY  01/2023    no polyps-due 2033    HYSTERECTOMY  1990       Review of Systems no sz mi cad or cva    This patient has   NO history of recent Covid nor flu symptoms,  NO Fever nor chills,  NO Chest pain, shortness of breath nor paroxysmal nocturnal dyspnea,  NO Nausea, vomiting, nor diarrhea,  NO Hematochezia nor melena,  NO Dysuria, hematuria, nor new incontinence issues  NO new severe headaches nor neurological  "complaints,  NO new issues with anxiety nor depression nor new psychiatric complaints,  NO suicidal nor homicidal ideations.     OBJECTIVE:  /74   Pulse 76   Temp 35.9 °C (96.6 °F) (Temporal)   Resp 16   Ht 1.549 m (5' 1\")   Wt 78.5 kg (173 lb)   LMP 01/01/1990 (Approximate)   SpO2 97%   BMI 32.69 kg/m²      General:  alert, oriented, no acute distress.  No obvious skin rashes noted.   No gait disturbance noted.    Mood is pleasant, not tearful, no signs of emotional distress.  Not appearing intoxicated or altered.   No voiced delusions,   Normal, appropriate behavior.    HEENT: Normocephalic, atraumatic,   Pupils round, reactive to light  Extraocular motions intact and wnl  Tympanic membranes normal    Neck: no nuchal rigidity  No masses palpable.  No carotid bruits.  No thyromegaly.    Respiratory: Equal breath sounds  No wheezes,    rales,    nor rhonchi  No respiratory distress.    Heart: Regular rate and rhythm, 3/6 sys   murmurs  no rubs/gallops  Extra beats ?pvcs  Abdomen: no masses palpable, no rebound nor guarding, no rebound nor guarding.    Extremities: NO cyanosis noted, no clubbing.   No edema noted.  2+dorsalis pedis pulses.    Normal-not antalgic, steady gait.    Lab on 08/03/2023   Component Date Value Ref Range Status    Glucose 08/03/2023 77  74 - 99 mg/dL Final    Sodium 08/03/2023 143  136 - 145 mmol/L Final    Potassium 08/03/2023 4.3  3.5 - 5.3 mmol/L Final    Chloride 08/03/2023 105  98 - 107 mmol/L Final    Bicarbonate 08/03/2023 28  21 - 32 mmol/L Final    Anion Gap 08/03/2023 14  10 - 20 mmol/L Final    Urea Nitrogen 08/03/2023 16  6 - 23 mg/dL Final    Creatinine 08/03/2023 0.33 (L)  0.50 - 1.05 mg/dL Final    GFR Female 08/03/2023 >90  >90 mL/min/1.73m2 Final     CALCULATIONS OF ESTIMATED GFR ARE PERFORMED   USING THE 2021 CKD-EPI STUDY REFIT EQUATION   WITHOUT THE RACE VARIABLE FOR THE IDMS-TRACEABLE   CREATININE " METHODS.    https://jasn.asnjournals.org/content/early/2021/09/22/ASN.[email or web address]    Calcium 08/03/2023 10.2  8.6 - 10.6 mg/dL Final    Albumin 08/03/2023 3.9  3.4 - 5.0 g/dL Final    Alkaline Phosphatase 08/03/2023 174 (H)  33 - 136 U/L Final    Total Protein 08/03/2023 7.0  6.4 - 8.2 g/dL Final    AST 08/03/2023 15  9 - 39 U/L Final    Total Bilirubin 08/03/2023 0.5  0.0 - 1.2 mg/dL Final    ALT (SGPT) 08/03/2023 21  7 - 45 U/L Final     Patients treated with Sulfasalazine may generate    falsely decreased results for ALT.    Cholesterol 08/03/2023 130  0 - 199 mg/dL Final    .      AGE      DESIRABLE   BORDERLINE HIGH   HIGH     0-19 Y     0 - 169       170 - 199     >/= 200    20-24 Y     0 - 189       190 - 224     >/= 225         >24 Y     0 - 199       200 - 239     >/= 240   **All ranges are based on fasting samples. Specific   therapeutic targets will vary based on patient-specific   cardiac risk.  .   Pediatric guidelines reference:Pediatrics 2011, 128(S5).   Adult guidelines reference: NCEP ATPIII Guidelines,     MARCO 2001, 258:2486-97  .   Venipuncture immediately after or during the    administration of Metamizole may lead to falsely   low results. Testing should be performed immediately   prior to Metamizole dosing.    HDL 08/03/2023 50.6  mg/dL Final    .      AGE      VERY LOW   LOW     NORMAL    HIGH       0-19 Y       < 35   < 40     40-45     ----    20-24 Y       ----   < 40       >45     ----      >24 Y       ----   < 40     40-60      >60  .    Cholesterol/HDL Ratio 08/03/2023 2.6   Final    REF VALUES  DESIRABLE  < 3.4  HIGH RISK  > 5.0    LDL 08/03/2023 65  0 - 99 mg/dL Final    .                           NEAR      BORD      AGE      DESIRABLE  OPTIMAL    HIGH     HIGH     VERY HIGH     0-19 Y     0 - 109     ---    110-129   >/= 130     ----    20-24 Y     0 - 119     ---    120-159   >/= 160     ----      >24 Y     0 -  99   100-129  130-159   160-189     >/=190  .    VLDL 08/03/2023 15  0  - 40 mg/dL Final    Triglycerides 08/03/2023 74  0 - 149 mg/dL Final    .      AGE      DESIRABLE   BORDERLINE HIGH   HIGH     VERY HIGH   0 D-90 D    19 - 174         ----         ----        ----  91 D- 9 Y     0 -  74        75 -  99     >/= 100      ----    10-19 Y     0 -  89        90 - 129     >/= 130      ----    20-24 Y     0 - 114       115 - 149     >/= 150      ----         >24 Y     0 - 149       150 - 199    200- 499    >/= 500  .   Venipuncture immediately after or during the    administration of Metamizole may lead to falsely   low results. Testing should be performed immediately   prior to Metamizole dosing.    WBC 08/03/2023 4.6  4.4 - 11.3 x10E9/L Final    nRBC 08/03/2023 0.0  0.0 - 0.0 /100 WBC Final    RBC 08/03/2023 5.42 (H)  4.00 - 5.20 x10E12/L Final    Hemoglobin 08/03/2023 13.5  12.0 - 16.0 g/dL Final    Hematocrit 08/03/2023 41.9  36.0 - 46.0 % Final    MCV 08/03/2023 77 (L)  80 - 100 fL Final    MCHC 08/03/2023 32.2  32.0 - 36.0 g/dL Final    Platelets 08/03/2023 245  150 - 450 x10E9/L Final    RDW 08/03/2023 12.9  11.5 - 14.5 % Final    Hemoglobin A1C 08/03/2023 5.1  % Final         Diagnosis of Diabetes-Adults   Non-Diabetic: < or = 5.6%   Increased risk for developing diabetes: 5.7-6.4%   Diagnostic of diabetes: > or = 6.5%  .       Monitoring of Diabetes                Age (y)     Therapeutic Goal (%)   Adults:          >18           <7.0   Pediatrics:    13-18           <7.5                   7-12           <8.0                   0- 6            7.5-8.5   American Diabetes Association. Diabetes Care 33(S1), Jan 2010.    Estimated Average Glucose 08/03/2023 100  MG/DL Final        Assessment/Plan     Problem List Items Addressed This Visit       Anxiety    Ductal carcinoma in situ (DCIS) of right breast    Essential hypertension    Hyperglycemia    Mixed hyperlipidemia     Other Visit Diagnoses       Routine general medical examination at health care facility    -  Primary     Encounter for Medicare annual wellness exam              Advance Care Planning Note   Discussion Date: 08/08/23   Discussion Participants: patient    The patient wishes to discuss Advance Care Planning today and the following is a brief summary of our discussion.     Patient has capacity to make their own medical decisions: Yes  Health Care Agent/Surrogate Decision Maker documented in chart: Yes  Documents on file and valid:  Advance Directive/Living Will: no   Health Care Power of : no   Full code desired    Communication of Medical Status/Prognosis:   yes   Communication of Treatment Goals/Options:   yes  Treatment Decisions  yes  Time Statement: Total face to face time spent on advance care planning was <30 minutes with <30 minutes spent in counseling, including the explanation.    SEE ME AT NEXT REGULARLY SCHEDULED VISIT-sooner if condition deteriorates or new problems arise.  Alk phos will repeat  Wt down from 222! To 173    Is on metformin doesn't want to be on but not major side effects   Is not trying a lot to lose wt on this  But definitely less hungry  No consitutional other sxs  Wants to dc metformin-will repeat cmp hbac in 3mo  6-8 wks w me  Felt better leaning fwd  Saw dr deras  Check EKG  Some constipation  Add colace 100mg and water  If not helpful will refer back to GI

## 2023-08-09 LAB
EBV INTERPRETATION: ABNORMAL
EPSTEIN-BARR VCA IGG: POSITIVE
EPSTEIN-BARR VCA IGM: NEGATIVE
EPSTEIN-BARR VIRUS EARLY ANTIGEN ANTIBODY, IGG: NEGATIVE
EPSTIEN-BARR NUCLEAR ANTIGEN AB: POSITIVE
FERRITIN (UG/LL) IN SER/PLAS: 852 UG/L (ref 8–150)

## 2023-08-11 DIAGNOSIS — K80.10 CALCULUS OF GALLBLADDER WITH CHOLECYSTITIS WITHOUT BILIARY OBSTRUCTION, UNSPECIFIED CHOLECYSTITIS ACUITY: ICD-10-CM

## 2023-08-12 LAB
ALKALINE PHOSPHATASE (REF): 191 U/L (ref 40–120)
ALKALINE PHOSPHATASE OTHER: 0 U/L
ALKALINE PHOSPHATASE.BONE (U/L) IN SERUM OR PLASMA: 117 U/L (ref 0–55)
ALKALINE PHOSPHATASE.LIVER (U/L) IN SERUM OR PLASMA: 74 U/L (ref 0–94)

## 2023-08-14 ENCOUNTER — TELEPHONE (OUTPATIENT)
Dept: PRIMARY CARE | Facility: CLINIC | Age: 68
End: 2023-08-14
Payer: MEDICARE

## 2023-08-15 ENCOUNTER — LAB (OUTPATIENT)
Dept: LAB | Facility: LAB | Age: 68
End: 2023-08-15
Payer: MEDICARE

## 2023-08-15 ENCOUNTER — OFFICE VISIT (OUTPATIENT)
Dept: PRIMARY CARE | Facility: CLINIC | Age: 68
End: 2023-08-15
Payer: MEDICARE

## 2023-08-15 VITALS
DIASTOLIC BLOOD PRESSURE: 78 MMHG | WEIGHT: 172 LBS | RESPIRATION RATE: 16 BRPM | HEART RATE: 72 BPM | HEIGHT: 61 IN | OXYGEN SATURATION: 98 % | TEMPERATURE: 97.1 F | BODY MASS INDEX: 32.47 KG/M2 | SYSTOLIC BLOOD PRESSURE: 136 MMHG

## 2023-08-15 DIAGNOSIS — K80.20 CALCULUS OF GALLBLADDER WITHOUT CHOLECYSTITIS WITHOUT OBSTRUCTION: ICD-10-CM

## 2023-08-15 DIAGNOSIS — R74.8 ELEVATED ALKALINE PHOSPHATASE LEVEL: ICD-10-CM

## 2023-08-15 DIAGNOSIS — E78.2 MIXED HYPERLIPIDEMIA: ICD-10-CM

## 2023-08-15 DIAGNOSIS — R73.9 HYPERGLYCEMIA: ICD-10-CM

## 2023-08-15 DIAGNOSIS — D05.11 DUCTAL CARCINOMA IN SITU (DCIS) OF RIGHT BREAST: ICD-10-CM

## 2023-08-15 DIAGNOSIS — I10 ESSENTIAL HYPERTENSION: ICD-10-CM

## 2023-08-15 PROCEDURE — 1159F MED LIST DOCD IN RCRD: CPT | Performed by: FAMILY MEDICINE

## 2023-08-15 PROCEDURE — 99213 OFFICE O/P EST LOW 20 MIN: CPT | Performed by: FAMILY MEDICINE

## 2023-08-15 PROCEDURE — 3078F DIAST BP <80 MM HG: CPT | Performed by: FAMILY MEDICINE

## 2023-08-15 PROCEDURE — 1036F TOBACCO NON-USER: CPT | Performed by: FAMILY MEDICINE

## 2023-08-15 PROCEDURE — 82652 VIT D 1 25-DIHYDROXY: CPT

## 2023-08-15 PROCEDURE — 3075F SYST BP GE 130 - 139MM HG: CPT | Performed by: FAMILY MEDICINE

## 2023-08-15 PROCEDURE — 36415 COLL VENOUS BLD VENIPUNCTURE: CPT

## 2023-08-15 PROCEDURE — 1160F RVW MEDS BY RX/DR IN RCRD: CPT | Performed by: FAMILY MEDICINE

## 2023-08-15 NOTE — PROGRESS NOTES
"Subjective   Patient ID: Marisol Weiner is a 68 y.o. female who presents for Results (labs).  Covid vax: x 3  CRC: due 2033  Mammogram: 7/2023  Pap: hysterectomy  Lmp: hysterectomy  HPI  Patient Active Problem List   Diagnosis    Anxiety    Ductal carcinoma in situ (DCIS) of right breast    Essential hypertension    Hyperglycemia    Mixed hyperlipidemia    Obesity, morbid (CMS/HCC)       Past Surgical History:   Procedure Laterality Date    BREAST BIOPSY Left 09/2020    fibroadenoma    BREAST BIOPSY Right 06/2021    ductal carcinoma in situ    COLONOSCOPY  01/2023    no polyps-due 2033    HYSTERECTOMY  1990       Review of Systems  This patient has   NO history of recent Covid nor flu symptoms,  NO Fever nor chills,  NO Chest pain, shortness of breath nor paroxysmal nocturnal dyspnea,  NO Nausea, vomiting, nor diarrhea,  NO Hematochezia nor melena,  NO Dysuria, hematuria, nor new incontinence issues  NO new severe headaches nor neurological complaints,  NO new issues with anxiety nor depression nor new psychiatric complaints,  NO suicidal nor homicidal ideations.     OBJECTIVE:  /78   Pulse 72   Temp 36.2 °C (97.1 °F) (Temporal)   Resp 16   Ht 1.549 m (5' 1\")   Wt 78 kg (172 lb)   LMP 01/01/1990 (Approximate)   SpO2 98%   BMI 32.50 kg/m²      General:  alert, oriented, no acute distress.  No obvious skin rashes noted.   No gait disturbance noted.    Mood is pleasant, not tearful, no signs of emotional distress.  Not appearing intoxicated or altered.   No voiced delusions,   Normal, appropriate behavior.    HEENT: Normocephalic, atraumatic,   Pupils round, reactive to light  Extraocular motions intact and wnl  Tympanic membranes normal    Neck: no nuchal rigidity  No masses palpable.  No carotid bruits.  No thyromegaly.    Respiratory: Equal breath sounds  No wheezes,    rales,    nor rhonchi  No respiratory distress.    Heart: Regular rate and rhythm, 2/6sys lsb    murmurs  no " History  Chief Complaint   Patient presents with   • Abnormal Lab     Pt is here for low serum K  Pt reports only symptom is feeling wobbly prior to getting labs which she was fasting for  Pt reports after eating she feels fine  History provided by:  Patient   used: No      Patient is a 52-year-old female presenting to emergency department with outpatient blood work showing low potassium of 2 3  States not feeling herself  Patient with recent colon resection due to malignancy, supposed to follow-up with oncology to get evaluated for radiation versus chemotherapy  No fevers or chills  No nausea vomiting  Currently on potassium supplements but not working  Prior to Admission Medications   Prescriptions Last Dose Informant Patient Reported? Taking? CAPECITABINE PO   Yes No   Sig: Take 1,650 mg by mouth 2 (two) times a day   POTASSIUM PO   Yes No   Sig: Take by mouth   Potassium Gluconate 550 MG TABS   Yes No   Sig: Take 1 tablet by mouth in the morning   acetaminophen (TYLENOL) 325 mg tablet   Yes No   Sig: Take 650 mg by mouth every 6 (six) hours as needed for mild pain   amLODIPine (NORVASC) 5 mg tablet   Yes No   Sig: Take 5 mg by mouth daily   cyanocobalamin (VITAMIN B-12) 1000 MCG tablet   Yes No   Sig: Take 1,000 mcg by mouth daily   potassium chloride (K-DUR,KLOR-CON) 20 mEq tablet   No No   Sig: Take 2 tablets (40 mEq total) by mouth daily for 7 days Do not start before February 15, 2023     predniSONE 10 mg tablet   No No   Sig: Take 2 tablets (20 mg total) by mouth daily   tamsulosin (FLOMAX) 0 4 mg   No No   Sig: Take 1 capsule (0 4 mg total) by mouth daily at bedtime   vancomycin (VANCOCIN) 125 MG capsule   No No   Sig: Take 1 capsule (125 mg total) by mouth 4 (four) times a day for 12 days, THEN 1 capsule (125 mg total) 2 (two) times a day for 7 days, THEN 1 capsule (125 mg total) in the morning for 7 days, THEN 1 capsule (125 mg total) every other day for 7 days, THEN 1 capsule (125 mg total) every 3 (three) days for 14 days  Do not start before February 14, 2023  Facility-Administered Medications: None       Past Medical History:   Diagnosis Date   • Cancer [facility])    • Colon cancer ([facility] [address] )    • Fall    • Headache    • Hemochromatosis, unspecified    • History of transfusion     2022 - no adverse reaction   • Kidney calculi    • Kidney stone    • Liver disease     hemangioma   • Muscle weakness    • Sarcoidosis    • Seizures ([facility] )     2022       Past Surgical History:   Procedure Laterality Date   • ABSCESS DRAINAGE      left breast   • CHEST TUBE INSERTION     • COLON SURGERY      colostomy   • COLONOSCOPY     • FL RETROGRADE PYELOGRAM  1/23/2023   • LUNG BIOPSY     • MO CYSTO/URETERO W/LITHOTRIPSY &INDWELL STENT INSRT Bilateral 1/23/2023    Procedure: CYSTOSCOPY  RIGHT URETEROSCOPY WITH LITHOTRIPSY HOLMIUM LASER, BILATERAL RETROGRADE PYELOGRAM AND BILATERAL  URETERAL STENT INSERTION;  Surgeon: Codie Liriano MD;  Location: [facility];  Service: Urology   • TONSILLECTOMY     • URINARY SURGERY Bilateral     bilateral stents       Family History   Problem Relation Age of Onset   • Cancer Mother    • Cirrhosis Father      I have reviewed and agree with the history as documented  E-Cigarette/Vaping   • E-Cigarette Use Never User      E-Cigarette/Vaping Substances   • Nicotine No    • THC No    • CBD No    • Other No    • Unknown No      Social History     Tobacco Use   • Smoking status: Never     Passive exposure: Past   • Smokeless tobacco: Never   Vaping Use   • Vaping Use: Never used   Substance Use Topics   • Alcohol use: Never   • Drug use: Never       Review of Systems   Constitutional: Negative for chills, diaphoresis and fever  Respiratory: Negative for cough, shortness of breath, wheezing and stridor  Cardiovascular: Negative for chest pain, palpitations and leg swelling     Gastrointestinal: Negative for abdominal pain, blood in stool, diarrhea, nausea rubs/gallops    Abdomen: no masses palpable, nontender, no rebound nor guarding.  Per pt intermittent mildly +murphys sign    Extremities: NO cyanosis noted, no clubbing.   No edema noted.  2+dorsalis pedis pulses.    Normal-not antalgic, steady gait.    Lab on 08/08/2023   Component Date Value Ref Range Status    Glucose 08/08/2023 89  74 - 99 mg/dL Final    Sodium 08/08/2023 142  136 - 145 mmol/L Final    Potassium 08/08/2023 3.7  3.5 - 5.3 mmol/L Final    Chloride 08/08/2023 105  98 - 107 mmol/L Final    Bicarbonate 08/08/2023 27  21 - 32 mmol/L Final    Anion Gap 08/08/2023 14  10 - 20 mmol/L Final    Urea Nitrogen 08/08/2023 14  6 - 23 mg/dL Final    Creatinine 08/08/2023 0.39 (L)  0.50 - 1.05 mg/dL Final    GFR Female 08/08/2023 >90  >90 mL/min/1.73m2 Final     CALCULATIONS OF ESTIMATED GFR ARE PERFORMED   USING THE 2021 CKD-EPI STUDY REFIT EQUATION   WITHOUT THE RACE VARIABLE FOR THE IDMS-TRACEABLE   CREATININE METHODS.    https://jasn.asnjournals.org/content/early/2021/09/22/ASN.2796075503    Calcium 08/08/2023 10.3  8.6 - 10.3 mg/dL Final    Albumin 08/08/2023 3.9  3.4 - 5.0 g/dL Final    Alkaline Phosphatase 08/08/2023 171 (H)  33 - 136 U/L Final    Total Protein 08/08/2023 7.3  6.4 - 8.2 g/dL Final    AST 08/08/2023 19  9 - 39 U/L Final    Total Bilirubin 08/08/2023 0.5  0.0 - 1.2 mg/dL Final    ALT (SGPT) 08/08/2023 25  7 - 45 U/L Final     Patients treated with Sulfasalazine may generate    falsely decreased results for ALT.    Liver Fraction: 08/08/2023 74  0 - 94 U/L Final    INTERPRETIVE INFORMATION: Alk-Phosphatase Liver Calc  Bone Specific Alkaline Phosphatase (1874509) and 5'-nucleotidase   (0247924) may be useful in identifying disorders of bone and   liver, respectively.    Bone Fraction: 08/08/2023 117 (H)  0 - 55 U/L Final    Alkaline Phosphatase, Other 08/08/2023 0  U/L Final    Performed By: [facility]  [address] [place] [id number]  : Hema CANTRELL  MD Shazia, PhD  CLIA Number: [id number]    Alkaline Phosphatase 08/08/2023 191 (H)  40 - 120 U/L Final    Ferritin 08/08/2023 852 (H)  8 - 150 ug/L Final    Vitamin B-12 08/08/2023 610  211 - 911 pg/mL Final    EBV VCA IgG Antibody 08/08/2023 POSITIVE (A)  NEGATIVE Final    LARY-PATRICIA VIRUS EARLY ANTIGEN A* 08/08/2023 NEGATIVE  NEGATIVE Final    EBV Nuclear Ag Ab 08/08/2023 POSITIVE (A)  NEGATIVE Final    EBV VCA IgM Antibody 08/08/2023 NEGATIVE  NEGATIVE Final    EBV Interpretation 08/08/2023 SEE BELOW   Final    .                       EBV INTERPRETATION CHART  .                 VCA-IGG  VCA-IGM  NA-IGG  EA-IGG  .                 --------------------------------  PRIMARY ACUTE       +/-      +/-      -      +/-  LATE ACUTE           +       +/-     +/-     +/-  RECOVERING           +        -       -       +  PREVIOUS INFECTION   +        -      +/-      -   Lab on 08/03/2023   Component Date Value Ref Range Status    Glucose 08/03/2023 77  74 - 99 mg/dL Final    Sodium 08/03/2023 143  136 - 145 mmol/L Final    Potassium 08/03/2023 4.3  3.5 - 5.3 mmol/L Final    Chloride 08/03/2023 105  98 - 107 mmol/L Final    Bicarbonate 08/03/2023 28  21 - 32 mmol/L Final    Anion Gap 08/03/2023 14  10 - 20 mmol/L Final    Urea Nitrogen 08/03/2023 16  6 - 23 mg/dL Final    Creatinine 08/03/2023 0.33 (L)  0.50 - 1.05 mg/dL Final    GFR Female 08/03/2023 >90  >90 mL/min/1.73m2 Final     CALCULATIONS OF ESTIMATED GFR ARE PERFORMED   USING THE 2021 CKD-EPI STUDY REFIT EQUATION   WITHOUT THE RACE VARIABLE FOR THE IDMS-TRACEABLE   CREATININE METHODS.    https://jasn.asnjournals.org/content/early/2021/09/22/ASN.7470650786    Calcium 08/03/2023 10.2  8.6 - 10.6 mg/dL Final    Albumin 08/03/2023 3.9  3.4 - 5.0 g/dL Final    Alkaline Phosphatase 08/03/2023 174 (H)  33 - 136 U/L Final    Total Protein 08/03/2023 7.0  6.4 - 8.2 g/dL Final    AST 08/03/2023 15  9 - 39 U/L Final    Total Bilirubin 08/03/2023 0.5  0.0 - 1.2 mg/dL Final     and vomiting  Genitourinary: Negative for dysuria, frequency and urgency  Musculoskeletal: Negative for neck stiffness  Skin: Negative for pallor and rash  Neurological: Negative for dizziness, syncope, weakness, light-headedness and headaches  All other systems reviewed and are negative  Physical Exam  Physical Exam  Vitals reviewed  Constitutional:       Appearance: Normal appearance  She is well-developed  HENT:      Head: Normocephalic and atraumatic  Eyes:      Extraocular Movements: Extraocular movements intact  Pupils: Pupils are equal, round, and reactive to light  Cardiovascular:      Rate and Rhythm: Normal rate and regular rhythm  Heart sounds: Normal heart sounds  Pulmonary:      Effort: Pulmonary effort is normal  No respiratory distress  Breath sounds: Normal breath sounds  Abdominal:      General: Bowel sounds are normal       Palpations: Abdomen is soft  Tenderness: There is no abdominal tenderness  Comments: Colostomy bag in place   Musculoskeletal:         General: No swelling or tenderness  Normal range of motion  Cervical back: Normal range of motion and neck supple  Skin:     General: Skin is warm and dry  Capillary Refill: Capillary refill takes less than 2 seconds  Neurological:      General: No focal deficit present  Mental Status: She is alert and oriented to person, place, and time           Vital Signs  ED Triage Vitals [03/13/23 1800]   Temperature Pulse Respirations Blood Pressure SpO2   98 °F (36 7 °C) 93 18 (!) 177/101 98 %      Temp Source Heart Rate Source Patient Position - Orthostatic VS BP Location FiO2 (%)   Oral Monitor Sitting Right arm --      Pain Score       --           Vitals:    03/13/23 1800 03/13/23 2215   BP: (!) 177/101 159/77   Pulse: 93 83   Patient Position - Orthostatic VS: Sitting          Visual Acuity      ED Medications  Medications   magnesium Oxide (MAG-OX) tablet 400 mg (has no ALT (SGPT) 08/03/2023 21  7 - 45 U/L Final     Patients treated with Sulfasalazine may generate    falsely decreased results for ALT.    Cholesterol 08/03/2023 130  0 - 199 mg/dL Final    .      AGE      DESIRABLE   BORDERLINE HIGH   HIGH     0-19 Y     0 - 169       170 - 199     >/= 200    20-24 Y     0 - 189       190 - 224     >/= 225         >24 Y     0 - 199       200 - 239     >/= 240   **All ranges are based on fasting samples. Specific   therapeutic targets will vary based on patient-specific   cardiac risk.  .   Pediatric guidelines reference:Pediatrics 2011, 128(S5).   Adult guidelines reference: NCEP ATPIII Guidelines,     MARCO 2001, 258:2486-97  .   Venipuncture immediately after or during the    administration of Metamizole may lead to falsely   low results. Testing should be performed immediately   prior to Metamizole dosing.    HDL 08/03/2023 50.6  mg/dL Final    .      AGE      VERY LOW   LOW     NORMAL    HIGH       0-19 Y       < 35   < 40     40-45     ----    20-24 Y       ----   < 40       >45     ----      >24 Y       ----   < 40     40-60      >60  .    Cholesterol/HDL Ratio 08/03/2023 2.6   Final    REF VALUES  DESIRABLE  < 3.4  HIGH RISK  > 5.0    LDL 08/03/2023 65  0 - 99 mg/dL Final    .                           NEAR      BORD      AGE      DESIRABLE  OPTIMAL    HIGH     HIGH     VERY HIGH     0-19 Y     0 - 109     ---    110-129   >/= 130     ----    20-24 Y     0 - 119     ---    120-159   >/= 160     ----      >24 Y     0 -  99   100-129  130-159   160-189     >/=190  .    VLDL 08/03/2023 15  0 - 40 mg/dL Final    Triglycerides 08/03/2023 74  0 - 149 mg/dL Final    .      AGE      DESIRABLE   BORDERLINE HIGH   HIGH     VERY HIGH   0 D-90 D    19 - 174         ----         ----        ----  91 D- 9 Y     0 -  74        75 -  99     >/= 100      ----    10-19 Y     0 -  89        90 - 129     >/= 130      ----    20-24 Y     0 - 114       115 - 149     >/= 150      ----         >24 Y    administration in time range)   potassium chloride (K-DUR,KLOR-CON) CR tablet 40 mEq (has no administration in time range)   potassium chloride 20 mEq IVPB (premix) (0 mEq Intravenous Stopped 3/13/23 2216)   potassium chloride (K-DUR,KLOR-CON) CR tablet 40 mEq (40 mEq Oral Given 3/13/23 1914)   sodium chloride 0 9 % bolus 500 mL (0 mL Intravenous Stopped 3/13/23 2216)       Diagnostic Studies  Results Reviewed     Procedure Component Value Units Date/Time    Magnesium [[id number]]  (Normal) Collected: 03/13/23 2216    Lab Status: Final result Specimen: Blood from Arm, Left Updated: 03/13/23 2244     Magnesium 1 9 mg/dL     Basic metabolic panel [[id number]]  (Abnormal) Collected: 03/13/23 2216    Lab Status: Final result Specimen: Blood from Arm, Left Updated: 03/13/23 2244     Sodium 143 mmol/L      Potassium 2 9 mmol/L      Chloride 108 mmol/L      CO2 27 mmol/L      ANION GAP 8 mmol/L      BUN 11 mg/dL      Creatinine 0 69 mg/dL      Glucose 260 mg/dL      Calcium 8 3 mg/dL      eGFR 98 ml/min/1 73sq m     Narrative:      [place] guidelines for Chronic Kidney Disease (CKD):   •  Stage 1 with normal or high GFR (GFR > 90 mL/min/1 73 square meters)  •  Stage 2 Mild CKD (GFR = 60-89 mL/min/1 73 square meters)  •  Stage 3A Moderate CKD (GFR = 45-59 mL/min/1 73 square meters)  •  Stage 3B Moderate CKD (GFR = 30-44 mL/min/1 73 square meters)  •  Stage 4 Severe CKD (GFR = 15-29 mL/min/1 73 square meters)  •  Stage 5 End Stage CKD (GFR <15 mL/min/1 73 square meters)  Note: GFR calculation is accurate only with a steady state creatinine    Hemoglobin A1C [[id number]] Collected: 03/13/23 1808    Lab Status:  In process Specimen: Blood from Arm, Left Updated: 03/13/23 2012    CBC and differential [[id number]]  (Abnormal) Collected: 03/13/23 1808    Lab Status: Final result Specimen: Blood from Arm, Left Updated: 03/13/23 1954     WBC 6 95 Thousand/uL      RBC 3 91 Million/uL      Hemoglobin 12 2 g/dL   0 - 149       150 - 199    200- 499    >/= 500  .   Venipuncture immediately after or during the    administration of Metamizole may lead to falsely   low results. Testing should be performed immediately   prior to Metamizole dosing.    WBC 08/03/2023 4.6  4.4 - 11.3 x10E9/L Final    nRBC 08/03/2023 0.0  0.0 - 0.0 /100 WBC Final    RBC 08/03/2023 5.42 (H)  4.00 - 5.20 x10E12/L Final    Hemoglobin 08/03/2023 13.5  12.0 - 16.0 g/dL Final    Hematocrit 08/03/2023 41.9  36.0 - 46.0 % Final    MCV 08/03/2023 77 (L)  80 - 100 fL Final    MCHC 08/03/2023 32.2  32.0 - 36.0 g/dL Final    Platelets 08/03/2023 245  150 - 450 x10E9/L Final    RDW 08/03/2023 12.9  11.5 - 14.5 % Final    Hemoglobin A1C 08/03/2023 5.1  % Final         Diagnosis of Diabetes-Adults   Non-Diabetic: < or = 5.6%   Increased risk for developing diabetes: 5.7-6.4%   Diagnostic of diabetes: > or = 6.5%  .       Monitoring of Diabetes                Age (y)     Therapeutic Goal (%)   Adults:          >18           <7.0   Pediatrics:    13-18           <7.5                   7-12           <8.0                   0- 6            7.5-8.5   American Diabetes Association. Diabetes Care 33(S1), Jan 2010.    Estimated Average Glucose 08/03/2023 100  MG/DL Final        Assessment/Plan     Problem List Items Addressed This Visit       Ductal carcinoma in situ (DCIS) of right breast    Essential hypertension    Hyperglycemia    Mixed hyperlipidemia     Other Visit Diagnoses       Elevated alkaline phosphatase level        Calculus of gallbladder without cholecystitis without obstruction                  To surgeon alk phos elevated somewhat yet bone frxn  Occ nausea  Some fatigue-echo planned  Cmp and ferritin cbc in 8-10wks  Vit d soon  See me 8-10wks  Echo planned 8/28     Hematocrit 39 1 %       fL      MCH 31 2 pg      MCHC 31 2 g/dL      RDW 14 2 %      MPV 9 8 fL      Platelets 084 Thousands/uL      nRBC 0 /100 WBCs      Neutrophils Relative 90 %      Immat GRANS % 1 %      Lymphocytes Relative 5 %      Monocytes Relative 4 %      Eosinophils Relative 0 %      Basophils Relative 0 %      Neutrophils Absolute 6 30 Thousands/µL      Immature Grans Absolute 0 04 Thousand/uL      Lymphocytes Absolute 0 34 Thousands/µL      Monocytes Absolute 0 25 Thousand/µL      Eosinophils Absolute 0 01 Thousand/µL      Basophils Absolute 0 01 Thousands/µL     Narrative: This is an appended report  These results have been appended to a previously verified report      Comprehensive metabolic panel [[id number]]  (Abnormal) Collected: 03/13/23 1808    Lab Status: Final result Specimen: Blood from Arm, Left Updated: 03/13/23 1846     Sodium 139 mmol/L      Potassium 2 8 mmol/L      Chloride 102 mmol/L      CO2 28 mmol/L      ANION GAP 9 mmol/L      BUN 14 mg/dL      Creatinine 0 82 mg/dL      Glucose 293 mg/dL      Calcium 9 0 mg/dL      Corrected Calcium 9 5 mg/dL      AST 49 U/L      ALT 58 U/L      Alkaline Phosphatase 366 U/L      Total Protein 6 2 g/dL      Albumin 3 4 g/dL      Total Bilirubin 1 30 mg/dL      eGFR 80 ml/min/1 73sq m     Narrative:      [place] guidelines for Chronic Kidney Disease (CKD):   •  Stage 1 with normal or high GFR (GFR > 90 mL/min/1 73 square meters)  •  Stage 2 Mild CKD (GFR = 60-89 mL/min/1 73 square meters)  •  Stage 3A Moderate CKD (GFR = 45-59 mL/min/1 73 square meters)  •  Stage 3B Moderate CKD (GFR = 30-44 mL/min/1 73 square meters)  •  Stage 4 Severe CKD (GFR = 15-29 mL/min/1 73 square meters)  •  Stage 5 End Stage CKD (GFR <15 mL/min/1 73 square meters)  Note: GFR calculation is accurate only with a steady state creatinine    Lipase [[id number]]  (Abnormal) Collected: 03/13/23 1808    Lab Status: Final result Specimen: Blood from Arm, Left Updated: 03/13/23 1846     Lipase 127 u/L                  No orders to display              Procedures  Procedures         ED Course                                             Medical Decision Making  52year-old hypokalemia, not on diuretics  Unsure of cause  Will check level  Replete as needed  We will also check magnesium  EKG to eval for arrhythmia    Potassium is 2 8 on labs here  Will give 20 IV and 40 p o  and recheck response  Did not respond appropriately  Repeat potassium 2 9  Will admit for observation  ECG shows rate of 85, sinus, normal axis, normal QRS, nonspecific ST and T waves, independently interpret by me    Amount and/or Complexity of Data Reviewed  Labs: ordered  Risk  Prescription drug management  Decision regarding hospitalization  Disposition  Final diagnoses:   Hypokalemia   Elevated blood sugar     Time reflects when diagnosis was documented in both MDM as applicable and the Disposition within this note     Time User Action Codes Description Comment    3/13/2023 11:06 PM Talia Velasquez [E87 [id number]] Hypokalemia     3/13/2023 11:06 PM Talia Velasquez [R73 [id number]] Elevated blood sugar       ED Disposition     ED Disposition   Admit    Condition   Stable    Date/Time   Mon Mar 13, 2023 11:06 PM    Comment   Case was discussed with Dr Sahil Mao and the patient's admission status was agreed to be Admission Status: observation status to the service of Dr Sahil Mao   Follow-up Information    None         Patient's Medications   Discharge Prescriptions    No medications on file       No discharge procedures on file      PDMP Review       Value Time User    PDMP Reviewed  Yes 2/11/2023  3:39 AM Gertha Leventhal, DO          ED Provider  Electronically Signed by           Juan Pablo Qureshi MD  03/13/23 [id number]

## 2023-08-18 LAB — VITAMIN D 1,25-DIHYDROXY: 50.2 PG/ML (ref 19.9–79.3)

## 2023-08-31 DIAGNOSIS — E78.2 MIXED HYPERLIPIDEMIA: Primary | ICD-10-CM

## 2023-08-31 RX ORDER — ATORVASTATIN CALCIUM 40 MG/1
40 TABLET, FILM COATED ORAL NIGHTLY
Qty: 90 TABLET | Refills: 0 | Status: SHIPPED | OUTPATIENT
Start: 2023-08-31 | End: 2023-12-02

## 2023-09-07 LAB
ANION GAP IN SER/PLAS: 12 MMOL/L (ref 10–20)
ANION GAP SERPL CALCULATED.3IONS-SCNC: 12 MMOL/L (ref 10–20)
BICARBONATE: 29 MMOL/L (ref 21–32)
CALCIUM (MG/DL) IN SER/PLAS: 9.8 MG/DL (ref 8.6–10.3)
CALCIUM SERPL-MCNC: 9.8 MG/DL (ref 8.6–10.3)
CARBON DIOXIDE, TOTAL (MMOL/L) IN SER/PLAS: 29 MMOL/L (ref 21–32)
CHLORIDE (MMOL/L) IN SER/PLAS: 105 MMOL/L (ref 98–107)
CHLORIDE BLD-SCNC: 105 MMOL/L (ref 98–107)
CREAT SERPL-MCNC: 0.38 MG/DL (ref 0.5–1.05)
CREATININE (MG/DL) IN SER/PLAS: 0.38 MG/DL (ref 0.5–1.05)
EGFR FEMALE: >90 ML/MIN/1.73M2
GFR FEMALE: >90 ML/MIN/1.73M2
GLUCOSE (MG/DL) IN SER/PLAS: 92 MG/DL (ref 74–99)
GLUCOSE: 92 MG/DL (ref 74–99)
POTASSIUM (MMOL/L) IN SER/PLAS: 3.7 MMOL/L (ref 3.5–5.3)
POTASSIUM SERPL-SCNC: 3.7 MMOL/L (ref 3.5–5.3)
SODIUM (MMOL/L) IN SER/PLAS: 142 MMOL/L (ref 136–145)
SODIUM BLD-SCNC: 142 MMOL/L (ref 136–145)
T4 FREE: 4.67 NG/DL (ref 0.61–1.12)
THYROTROPIN (MIU/L) IN SER/PLAS BY DETECTION LIMIT <= 0.05 MIU/L: <0.01 MIU/L (ref 0.44–3.98)
THYROXINE (T4) FREE (NG/DL) IN SER/PLAS: 4.67 NG/DL (ref 0.61–1.12)
TSH SERPL DL<=0.05 MIU/L-ACNC: <0.01 MIU/L (ref 0.44–3.98)
UREA NITROGEN (MG/DL) IN SER/PLAS: 11 MG/DL (ref 6–23)
UREA NITROGEN: 11 MG/DL (ref 6–23)

## 2023-09-11 ENCOUNTER — TELEMEDICINE (OUTPATIENT)
Dept: PRIMARY CARE | Facility: CLINIC | Age: 68
End: 2023-09-11
Payer: MEDICARE

## 2023-09-11 ENCOUNTER — TELEPHONE (OUTPATIENT)
Dept: PRIMARY CARE | Facility: CLINIC | Age: 68
End: 2023-09-11

## 2023-09-11 DIAGNOSIS — F41.9 ANXIETY: ICD-10-CM

## 2023-09-11 DIAGNOSIS — R73.9 HYPERGLYCEMIA: ICD-10-CM

## 2023-09-11 DIAGNOSIS — I10 ESSENTIAL HYPERTENSION: Primary | ICD-10-CM

## 2023-09-11 DIAGNOSIS — E05.90 HYPERTHYROIDISM: ICD-10-CM

## 2023-09-11 DIAGNOSIS — E78.2 MIXED HYPERLIPIDEMIA: ICD-10-CM

## 2023-09-11 DIAGNOSIS — E66.01 OBESITY, MORBID (MULTI): ICD-10-CM

## 2023-09-11 DIAGNOSIS — D05.11 DUCTAL CARCINOMA IN SITU (DCIS) OF RIGHT BREAST: ICD-10-CM

## 2023-09-11 DIAGNOSIS — E05.90 HYPERTHYROIDISM: Primary | ICD-10-CM

## 2023-09-11 PROCEDURE — G2012 BRIEF CHECK IN BY MD/QHP: HCPCS | Performed by: FAMILY MEDICINE

## 2023-09-11 NOTE — PROGRESS NOTES
Subjective   Patient ID: Marisol Weiner is a 68 y.o. female who presents and consented for VIRTUAL VISIT ---     VIDEO    Hyperthyroid  New -discovered by dr schmitt labs  Dr mccarty added metoprolol    HPI  Patient Active Problem List   Diagnosis    Anxiety    Ductal carcinoma in situ (DCIS) of right breast    Essential hypertension    Hyperglycemia    Mixed hyperlipidemia    Obesity, morbid (CMS/HCC)       Past Surgical History:   Procedure Laterality Date    BREAST BIOPSY Left 09/2020    fibroadenoma    BREAST BIOPSY Right 06/2021    ductal carcinoma in situ    COLONOSCOPY  01/2023    no polyps-due 2033    HYSTERECTOMY  1990       Review of Systems  This patient has   NO history of recent Covid nor flu symptoms,      NO Fever nor chills,  NO Chest pain, shortness of breath nor paroxysmal nocturnal dyspnea,  NO Nausea, vomiting, nor diarrhea,  NO Hematochezia nor melena,  NO Dysuria, hematuria, nor new incontinence issues  NO new severe headaches nor neurological complaints,  NO new issues with anxiety nor depression nor new psychiatric complaints,  NO suicidal nor homicidal ideations.     OBJECTIVE:  LMP 01/01/1990 (Approximate)      General:  alert, oriented, no acute distress.  Mood is pleasant, not tearful, no signs of emotional distress.  Not appearing intoxicated or altered.   No voiced delusions,   Normal, appropriate behavior.    HEENT: Normocephalic, atraumatic,   Pupils round, reactive to light  Extraocular motions intact and wnl    Conversing sans difficulty does NOT appear to be short of breath-in severe pain or toxic appearing    Appears to be in baseline health      Orders Only on 09/07/2023   Component Date Value Ref Range Status    Glucose 09/07/2023 92  74 - 99 mg/dL Final    Sodium 09/07/2023 142  136 - 145 mmol/L Final    Potassium 09/07/2023 3.7  3.5 - 5.3 mmol/L Final    Chloride 09/07/2023 105  98 - 107 mmol/L Final    Bicarbonate 09/07/2023 29  21 - 32 mmol/L Final    Anion Gap 09/07/2023 12   10 - 20 mmol/L Final    Urea Nitrogen 09/07/2023 11  6 - 23 mg/dL Final    Creatinine 09/07/2023 0.38 (L)  0.50 - 1.05 mg/dL Final    GFR Female 09/07/2023 >90  >90 mL/min/1.73m2 Final    Comment:  CALCULATIONS OF ESTIMATED GFR ARE PERFORMED   USING THE 2021 CKD-EPI STUDY REFIT EQUATION   WITHOUT THE RACE VARIABLE FOR THE IDMS-TRACEABLE   CREATININE METHODS.    https://jasn.asnjournals.org/content/early/2021/09/22/ASN.[email or web address]      Calcium 09/07/2023 9.8  8.6 - 10.3 mg/dL Final    TSH 09/07/2023 <0.01 (L)  0.44 - 3.98 mIU/L Final    Comment:  TSH testing is performed using different testing    methodology at [facility] than at [facility]. Direct result comparisons should    only be made within the same method.      Free T4 09/07/2023 4.67 (H)  0.61 - 1.12 ng/dL Final    Comment:  Thyroxine Free testing is performed using different testing    methodology at [facility] than at [facility]. Direct result comparisons should    only be made within the same method.  .   Biotin can cause falsely elevated free T4 results. Patients   taking a Biotin dose of up to 10 mg/day should refrain from   taking Biotin for 24 hours before sample collection. Patient   taking a Biotin dose of >10 mg/day should consult with their   physician or the laboratory before the blood draw.     Lab on 08/15/2023   Component Date Value Ref Range Status    Vit D, 1,25-Dihydroxy 08/15/2023 50.2  19.9 - 79.3 pg/mL Final    INTERPRETIVE INFORMATION: Vitamin D, 1,25-Dihydroxy  This test is primarily indicated during patient evaluation for   hypercalcemia and renal failure. A normal result does not rule out   Vitamin D deficiency. The recommended test for diagnosing Vitamin   D deficiency is Vitamin D 25-hydroxy.  Performed By: [facility]  [address] [place] [address]  : Hema Mccray MD, PhD  CLIA Number: [id number]   Lab on 08/08/2023   Component  Date Value Ref Range Status    Glucose 08/08/2023 89  74 - 99 mg/dL Final    Sodium 08/08/2023 142  136 - 145 mmol/L Final    Potassium 08/08/2023 3.7  3.5 - 5.3 mmol/L Final    Chloride 08/08/2023 105  98 - 107 mmol/L Final    Bicarbonate 08/08/2023 27  21 - 32 mmol/L Final    Anion Gap 08/08/2023 14  10 - 20 mmol/L Final    Urea Nitrogen 08/08/2023 14  6 - 23 mg/dL Final    Creatinine 08/08/2023 0.39 (L)  0.50 - 1.05 mg/dL Final    GFR Female 08/08/2023 >90  >90 mL/min/1.73m2 Final     CALCULATIONS OF ESTIMATED GFR ARE PERFORMED   USING THE 2021 CKD-EPI STUDY REFIT EQUATION   WITHOUT THE RACE VARIABLE FOR THE IDMS-TRACEABLE   CREATININE METHODS.    https://jasn.asnjournals.org/content/early/2021/09/22/ASN.1236929586    Calcium 08/08/2023 10.3  8.6 - 10.3 mg/dL Final    Albumin 08/08/2023 3.9  3.4 - 5.0 g/dL Final    Alkaline Phosphatase 08/08/2023 171 (H)  33 - 136 U/L Final    Total Protein 08/08/2023 7.3  6.4 - 8.2 g/dL Final    AST 08/08/2023 19  9 - 39 U/L Final    Total Bilirubin 08/08/2023 0.5  0.0 - 1.2 mg/dL Final    ALT (SGPT) 08/08/2023 25  7 - 45 U/L Final     Patients treated with Sulfasalazine may generate    falsely decreased results for ALT.    Liver Fraction: 08/08/2023 74  0 - 94 U/L Final    INTERPRETIVE INFORMATION: Alk-Phosphatase Liver Calc  Bone Specific Alkaline Phosphatase (2737289) and 5'-nucleotidase   ([id number]) may be useful in identifying disorders of bone and   liver, respectively.    Bone Fraction: 08/08/2023 117 (H)  0 - 55 U/L Final    Alkaline Phosphatase, Other 08/08/2023 0  U/L Final    Performed By: [facility]  [address] [place] [id number]  : Hema Mccray MD, PhD  CLIA Number: [id number]    Alkaline Phosphatase 08/08/2023 191 (H)  40 - 120 U/L Final    Ferritin 08/08/2023 852 (H)  8 - 150 ug/L Final    Vitamin B-12 08/08/2023 610  211 - 911 pg/mL Final    EBV VCA IgG Antibody 08/08/2023 POSITIVE (A)  NEGATIVE Final    LARY-PATRICIA  VIRUS EARLY ANTIGEN A* 08/08/2023 NEGATIVE  NEGATIVE Final    EBV Nuclear Ag Ab 08/08/2023 POSITIVE (A)  NEGATIVE Final    EBV VCA IgM Antibody 08/08/2023 NEGATIVE  NEGATIVE Final    EBV Interpretation 08/08/2023 SEE BELOW   Final    .                       EBV INTERPRETATION CHART  .                 VCA-IGG  VCA-IGM  NA-IGG  EA-IGG  .                 --------------------------------  PRIMARY ACUTE       +/-      +/-      -      +/-  LATE ACUTE           +       +/-     +/-     +/-  RECOVERING           +        -       -       +  PREVIOUS INFECTION   +        -      +/-      -   Lab on 08/03/2023   Component Date Value Ref Range Status    Glucose 08/03/2023 77  74 - 99 mg/dL Final    Sodium 08/03/2023 143  136 - 145 mmol/L Final    Potassium 08/03/2023 4.3  3.5 - 5.3 mmol/L Final    Chloride 08/03/2023 105  98 - 107 mmol/L Final    Bicarbonate 08/03/2023 28  21 - 32 mmol/L Final    Anion Gap 08/03/2023 14  10 - 20 mmol/L Final    Urea Nitrogen 08/03/2023 16  6 - 23 mg/dL Final    Creatinine 08/03/2023 0.33 (L)  0.50 - 1.05 mg/dL Final    GFR Female 08/03/2023 >90  >90 mL/min/1.73m2 Final     CALCULATIONS OF ESTIMATED GFR ARE PERFORMED   USING THE 2021 CKD-EPI STUDY REFIT EQUATION   WITHOUT THE RACE VARIABLE FOR THE IDMS-TRACEABLE   CREATININE METHODS.    https://jasn.asnjournals.org/content/early/2021/09/22/ASN.1150810177    Calcium 08/03/2023 10.2  8.6 - 10.6 mg/dL Final    Albumin 08/03/2023 3.9  3.4 - 5.0 g/dL Final    Alkaline Phosphatase 08/03/2023 174 (H)  33 - 136 U/L Final    Total Protein 08/03/2023 7.0  6.4 - 8.2 g/dL Final    AST 08/03/2023 15  9 - 39 U/L Final    Total Bilirubin 08/03/2023 0.5  0.0 - 1.2 mg/dL Final    ALT (SGPT) 08/03/2023 21  7 - 45 U/L Final     Patients treated with Sulfasalazine may generate    falsely decreased results for ALT.    Cholesterol 08/03/2023 130  0 - 199 mg/dL Final    .      AGE      DESIRABLE   BORDERLINE HIGH   HIGH     0-19 Y     0 - 169       170 - 199     >/=  200    20-24 Y     0 - 189       190 - 224     >/= 225         >24 Y     0 - 199       200 - 239     >/= 240   **All ranges are based on fasting samples. Specific   therapeutic targets will vary based on patient-specific   cardiac risk.  .   Pediatric guidelines reference:Pediatrics 2011, 128(S5).   Adult guidelines reference: NCEP ATPIII Guidelines,     MARCO 2001, 258:2486-97  .   Venipuncture immediately after or during the    administration of Metamizole may lead to falsely   low results. Testing should be performed immediately   prior to Metamizole dosing.    HDL 08/03/2023 50.6  mg/dL Final    .      AGE      VERY LOW   LOW     NORMAL    HIGH       0-19 Y       < 35   < 40     40-45     ----    20-24 Y       ----   < 40       >45     ----      >24 Y       ----   < 40     40-60      >60  .    Cholesterol/HDL Ratio 08/03/2023 2.6   Final    REF VALUES  DESIRABLE  < 3.4  HIGH RISK  > 5.0    LDL 08/03/2023 65  0 - 99 mg/dL Final    .                           NEAR      BORD      AGE      DESIRABLE  OPTIMAL    HIGH     HIGH     VERY HIGH     0-19 Y     0 - 109     ---    110-129   >/= 130     ----    20-24 Y     0 - 119     ---    120-159   >/= 160     ----      >24 Y     0 -  99   100-129  130-159   160-189     >/=190  .    VLDL 08/03/2023 15  0 - 40 mg/dL Final    Triglycerides 08/03/2023 74  0 - 149 mg/dL Final    .      AGE      DESIRABLE   BORDERLINE HIGH   HIGH     VERY HIGH   0 D-90 D    19 - 174         ----         ----        ----  91 D- 9 Y     0 -  74        75 -  99     >/= 100      ----    10-19 Y     0 -  89        90 - 129     >/= 130      ----    20-24 Y     0 - 114       115 - 149     >/= 150      ----         >24 Y     0 - 149       150 - 199    200- 499    >/= 500  .   Venipuncture immediately after or during the    administration of Metamizole may lead to falsely   low results. Testing should be performed immediately   prior to Metamizole dosing.    WBC 08/03/2023 4.6  4.4 - 11.3 x10E9/L Final     nRBC 08/03/2023 0.0  0.0 - 0.0 /100 WBC Final    RBC 08/03/2023 5.42 (H)  4.00 - 5.20 x10E12/L Final    Hemoglobin 08/03/2023 13.5  12.0 - 16.0 g/dL Final    Hematocrit 08/03/2023 41.9  36.0 - 46.0 % Final    MCV 08/03/2023 77 (L)  80 - 100 fL Final    MCHC 08/03/2023 32.2  32.0 - 36.0 g/dL Final    Platelets 08/03/2023 245  150 - 450 x10E9/L Final    RDW 08/03/2023 12.9  11.5 - 14.5 % Final    Hemoglobin A1C 08/03/2023 5.1  % Final         Diagnosis of Diabetes-Adults   Non-Diabetic: < or = 5.6%   Increased risk for developing diabetes: 5.7-6.4%   Diagnostic of diabetes: > or = 6.5%  .       Monitoring of Diabetes                Age (y)     Therapeutic Goal (%)   Adults:          >18           <7.0   Pediatrics:    13-18           <7.5                   7-12           <8.0                   0- 6            7.5-8.5   American Diabetes Association. Diabetes Care 33(S1), Jan 2010.    Estimated Average Glucose 08/03/2023 100  MG/DL Final        Assessment/Plan     Problem List Items Addressed This Visit    None  Visit Diagnoses       Hyperthyroidism    -  Primary    Relevant Orders    NM thyroid uptake and scan    T3, free    Referral to Endocrinology    Comprehensive Metabolic Panel    Thyroglobulin and Antithyroglobulin    Thyroid Peroxidase (TPO) Antibody            Patient is aware of LIMITATIONS of VIRTUAL VISITS and how-of course-an IN PERSON VISIT IS always ideal or preferred. IF condition deteriorates from here-ER IS DEFINITELY ADVISED.

## 2023-09-11 NOTE — PROGRESS NOTES
Subjective   Patient ID: Marisol Weiner is a 68 y.o. female who presents and consented for VIRTUAL VISIT ---     VIDEO  I called her and got verbal consent for vv when informed of abn thyroid labs by DR PADRON  Per hx pt clinically may have had hyperthyroidism in past 1-2mo      HPI  Patient Active Problem List   Diagnosis    Anxiety    Ductal carcinoma in situ (DCIS) of right breast    Essential hypertension    Hyperglycemia    Mixed hyperlipidemia    Obesity, morbid (CMS/HCC)       Past Surgical History:   Procedure Laterality Date    BREAST BIOPSY Left 09/2020    fibroadenoma    BREAST BIOPSY Right 06/2021    ductal carcinoma in situ    COLONOSCOPY  01/2023    no polyps-due 2033    HYSTERECTOMY  1990       Review of Systems  This patient has   NO history of recent Covid nor flu symptoms,      NO Fever nor chills,  NO Chest pain, shortness of breath nor paroxysmal nocturnal dyspnea,  NO Nausea, vomiting, nor diarrhea,  NO Hematochezia nor melena,  NO Dysuria, hematuria, nor new incontinence issues  NO new severe headaches nor neurological complaints,  NO new issues with anxiety nor depression nor new psychiatric complaints,  NO suicidal nor homicidal ideations.     OBJECTIVE:  LMP 01/01/1990 (Approximate)      General:  alert, oriented, no acute distress.  Mood is pleasant, not tearful, no signs of emotional distress.  Not appearing intoxicated or altered.   No voiced delusions,   Normal, appropriate behavior.    On phone  Has appropriate ?s    Appears to be in baseline health      Orders Only on 09/07/2023   Component Date Value Ref Range Status    Glucose 09/07/2023 92  74 - 99 mg/dL Final    Sodium 09/07/2023 142  136 - 145 mmol/L Final    Potassium 09/07/2023 3.7  3.5 - 5.3 mmol/L Final    Chloride 09/07/2023 105  98 - 107 mmol/L Final    Bicarbonate 09/07/2023 29  21 - 32 mmol/L Final    Anion Gap 09/07/2023 12  10 - 20 mmol/L Final    Urea Nitrogen 09/07/2023 11  6 - 23 mg/dL Final    Creatinine 09/07/2023  0.38 (L)  0.50 - 1.05 mg/dL Final    GFR Female 09/07/2023 >90  >90 mL/min/1.73m2 Final    Comment:  CALCULATIONS OF ESTIMATED GFR ARE PERFORMED   USING THE 2021 CKD-EPI STUDY REFIT EQUATION   WITHOUT THE RACE VARIABLE FOR THE IDMS-TRACEABLE   CREATININE METHODS.    https://jasn.asnjournals.org/content/early/2021/09/22/ASN.2026200241      Calcium 09/07/2023 9.8  8.6 - 10.3 mg/dL Final    TSH 09/07/2023 <0.01 (L)  0.44 - 3.98 mIU/L Final    Comment:  TSH testing is performed using different testing    methodology at [facility] than at other    [facility]. Direct result comparisons should    only be made within the same method.      Free T4 09/07/2023 4.67 (H)  0.61 - 1.12 ng/dL Final    Comment:  Thyroxine Free testing is performed using different testing    methodology at [facility] than at other    [facility]. Direct result comparisons should    only be made within the same method.  .   Biotin can cause falsely elevated free T4 results. Patients   taking a Biotin dose of up to 10 mg/day should refrain from   taking Biotin for 24 hours before sample collection. Patient   taking a Biotin dose of >10 mg/day should consult with their   physician or the laboratory before the blood draw.     Lab on 08/15/2023   Component Date Value Ref Range Status    Vit D, 1,25-Dihydroxy 08/15/2023 50.2  19.9 - 79.3 pg/mL Final    INTERPRETIVE INFORMATION: Vitamin D, 1,25-Dihydroxy  This test is primarily indicated during patient evaluation for   hypercalcemia and renal failure. A normal result does not rule out   Vitamin D deficiency. The recommended test for diagnosing Vitamin   D deficiency is Vitamin D 25-hydroxy.  Performed By: [facility]  [address] [place] [address]  : Hema Mccray MD, PhD  CLIA Number: [id number]   Lab on 08/08/2023   Component Date Value Ref Range Status    Glucose 08/08/2023 89  74 - 99 mg/dL Final    Sodium 08/08/2023 142   136 - 145 mmol/L Final    Potassium 08/08/2023 3.7  3.5 - 5.3 mmol/L Final    Chloride 08/08/2023 105  98 - 107 mmol/L Final    Bicarbonate 08/08/2023 27  21 - 32 mmol/L Final    Anion Gap 08/08/2023 14  10 - 20 mmol/L Final    Urea Nitrogen 08/08/2023 14  6 - 23 mg/dL Final    Creatinine 08/08/2023 0.39 (L)  0.50 - 1.05 mg/dL Final    GFR Female 08/08/2023 >90  >90 mL/min/1.73m2 Final     CALCULATIONS OF ESTIMATED GFR ARE PERFORMED   USING THE 2021 CKD-EPI STUDY REFIT EQUATION   WITHOUT THE RACE VARIABLE FOR THE IDMS-TRACEABLE   CREATININE METHODS.    https://jasn.asnjournals.org/content/early/2021/09/22/ASN.6194077028    Calcium 08/08/2023 10.3  8.6 - 10.3 mg/dL Final    Albumin 08/08/2023 3.9  3.4 - 5.0 g/dL Final    Alkaline Phosphatase 08/08/2023 171 (H)  33 - 136 U/L Final    Total Protein 08/08/2023 7.3  6.4 - 8.2 g/dL Final    AST 08/08/2023 19  9 - 39 U/L Final    Total Bilirubin 08/08/2023 0.5  0.0 - 1.2 mg/dL Final    ALT (SGPT) 08/08/2023 25  7 - 45 U/L Final     Patients treated with Sulfasalazine may generate    falsely decreased results for ALT.    Liver Fraction: 08/08/2023 74  0 - 94 U/L Final    INTERPRETIVE INFORMATION: Alk-Phosphatase Liver Calc  Bone Specific Alkaline Phosphatase (7989039) and 5'-nucleotidase   ([id number]) may be useful in identifying disorders of bone and   liver, respectively.    Bone Fraction: 08/08/2023 117 (H)  0 - 55 U/L Final    Alkaline Phosphatase, Other 08/08/2023 0  U/L Final    Performed By: [facility]  [address] [place] [address]  : Hema Mccray MD, PhD  CLIA Number: [id number]    Alkaline Phosphatase 08/08/2023 191 (H)  40 - 120 U/L Final    Ferritin 08/08/2023 852 (H)  8 - 150 ug/L Final    Vitamin B-12 08/08/2023 610  211 - 911 pg/mL Final    EBV VCA IgG Antibody 08/08/2023 POSITIVE (A)  NEGATIVE Final    LARY-PATRICIA VIRUS EARLY ANTIGEN A* 08/08/2023 NEGATIVE  NEGATIVE Final    EBV Nuclear Ag Ab 08/08/2023 POSITIVE  (A)  NEGATIVE Final    EBV VCA IgM Antibody 08/08/2023 NEGATIVE  NEGATIVE Final    EBV Interpretation 08/08/2023 SEE BELOW   Final    .                       EBV INTERPRETATION CHART  .                 VCA-IGG  VCA-IGM  NA-IGG  EA-IGG  .                 --------------------------------  PRIMARY ACUTE       +/-      +/-      -      +/-  LATE ACUTE           +       +/-     +/-     +/-  RECOVERING           +        -       -       +  PREVIOUS INFECTION   +        -      +/-      -   Lab on 08/03/2023   Component Date Value Ref Range Status    Glucose 08/03/2023 77  74 - 99 mg/dL Final    Sodium 08/03/2023 143  136 - 145 mmol/L Final    Potassium 08/03/2023 4.3  3.5 - 5.3 mmol/L Final    Chloride 08/03/2023 105  98 - 107 mmol/L Final    Bicarbonate 08/03/2023 28  21 - 32 mmol/L Final    Anion Gap 08/03/2023 14  10 - 20 mmol/L Final    Urea Nitrogen 08/03/2023 16  6 - 23 mg/dL Final    Creatinine 08/03/2023 0.33 (L)  0.50 - 1.05 mg/dL Final    GFR Female 08/03/2023 >90  >90 mL/min/1.73m2 Final     CALCULATIONS OF ESTIMATED GFR ARE PERFORMED   USING THE 2021 CKD-EPI STUDY REFIT EQUATION   WITHOUT THE RACE VARIABLE FOR THE IDMS-TRACEABLE   CREATININE METHODS.    https://jasn.asnjournals.org/content/early/2021/09/22/ASN.0544594220    Calcium 08/03/2023 10.2  8.6 - 10.6 mg/dL Final    Albumin 08/03/2023 3.9  3.4 - 5.0 g/dL Final    Alkaline Phosphatase 08/03/2023 174 (H)  33 - 136 U/L Final    Total Protein 08/03/2023 7.0  6.4 - 8.2 g/dL Final    AST 08/03/2023 15  9 - 39 U/L Final    Total Bilirubin 08/03/2023 0.5  0.0 - 1.2 mg/dL Final    ALT (SGPT) 08/03/2023 21  7 - 45 U/L Final     Patients treated with Sulfasalazine may generate    falsely decreased results for ALT.    Cholesterol 08/03/2023 130  0 - 199 mg/dL Final    .      AGE      DESIRABLE   BORDERLINE HIGH   HIGH     0-19 Y     0 - 169       170 - 199     >/= 200    20-24 Y     0 - 189       190 - 224     >/= 225         >24 Y     0 - 199       200 - 239      >/= 240   **All ranges are based on fasting samples. Specific   therapeutic targets will vary based on patient-specific   cardiac risk.  .   Pediatric guidelines reference:Pediatrics 2011, 128(S5).   Adult guidelines reference: NCEP ATPIII Guidelines,     MARCO 2001, 258:2486-97  .   Venipuncture immediately after or during the    administration of Metamizole may lead to falsely   low results. Testing should be performed immediately   prior to Metamizole dosing.    HDL 08/03/2023 50.6  mg/dL Final    .      AGE      VERY LOW   LOW     NORMAL    HIGH       0-19 Y       < 35   < 40     40-45     ----    20-24 Y       ----   < 40       >45     ----      >24 Y       ----   < 40     40-60      >60  .    Cholesterol/HDL Ratio 08/03/2023 2.6   Final    REF VALUES  DESIRABLE  < 3.4  HIGH RISK  > 5.0    LDL 08/03/2023 65  0 - 99 mg/dL Final    .                           NEAR      BORD      AGE      DESIRABLE  OPTIMAL    HIGH     HIGH     VERY HIGH     0-19 Y     0 - 109     ---    110-129   >/= 130     ----    20-24 Y     0 - 119     ---    120-159   >/= 160     ----      >24 Y     0 -  99   100-129  130-159   160-189     >/=190  .    VLDL 08/03/2023 15  0 - 40 mg/dL Final    Triglycerides 08/03/2023 74  0 - 149 mg/dL Final    .      AGE      DESIRABLE   BORDERLINE HIGH   HIGH     VERY HIGH   0 D-90 D    19 - 174         ----         ----        ----  91 D- 9 Y     0 -  74        75 -  99     >/= 100      ----    10-19 Y     0 -  89        90 - 129     >/= 130      ----    20-24 Y     0 - 114       115 - 149     >/= 150      ----         >24 Y     0 - 149       150 - 199    200- 499    >/= 500  .   Venipuncture immediately after or during the    administration of Metamizole may lead to falsely   low results. Testing should be performed immediately   prior to Metamizole dosing.    WBC 08/03/2023 4.6  4.4 - 11.3 x10E9/L Final    nRBC 08/03/2023 0.0  0.0 - 0.0 /100 WBC Final    RBC 08/03/2023 5.42 (H)  4.00 - 5.20 x10E12/L  Final    Hemoglobin 08/03/2023 13.5  12.0 - 16.0 g/dL Final    Hematocrit 08/03/2023 41.9  36.0 - 46.0 % Final    MCV 08/03/2023 77 (L)  80 - 100 fL Final    MCHC 08/03/2023 32.2  32.0 - 36.0 g/dL Final    Platelets 08/03/2023 245  150 - 450 x10E9/L Final    RDW 08/03/2023 12.9  11.5 - 14.5 % Final    Hemoglobin A1C 08/03/2023 5.1  % Final         Diagnosis of Diabetes-Adults   Non-Diabetic: < or = 5.6%   Increased risk for developing diabetes: 5.7-6.4%   Diagnostic of diabetes: > or = 6.5%  .       Monitoring of Diabetes                Age (y)     Therapeutic Goal (%)   Adults:          >18           <7.0   Pediatrics:    13-18           <7.5                   7-12           <8.0                   0- 6            7.5-8.5   American Diabetes Association. Diabetes Care 33(S1), Jan 2010.    Estimated Average Glucose 08/03/2023 100  MG/DL Final        Assessment/Plan     Problem List Items Addressed This Visit       Anxiety    Ductal carcinoma in situ (DCIS) of right breast    Essential hypertension - Primary    Hyperglycemia    Mixed hyperlipidemia    Obesity, morbid (CMS/HCC)     Other Visit Diagnoses       Hyperthyroidism                Patient is aware of LIMITATIONS of VIRTUAL VISITS and how-of course-an IN PERSON VISIT IS always ideal or preferred. IF condition deteriorates from here-ER IS DEFINITELY ADVISED.  NEW dx abn thyroid  On TOPROL-newly w DR PADRON  And stat THYROID NUCLEAR SCAN AND antibodies etc  And see me 4-6wks  And to ENDO  Advised if no word on soon appts for these in next 48hrs-needs to call  And d/w pt signs/sxs necessitating er

## 2023-09-13 ENCOUNTER — LAB (OUTPATIENT)
Dept: LAB | Facility: LAB | Age: 68
End: 2023-09-13
Payer: MEDICARE

## 2023-09-13 DIAGNOSIS — E05.90 HYPERTHYROIDISM: ICD-10-CM

## 2023-09-13 LAB
ALANINE AMINOTRANSFERASE (SGPT) (U/L) IN SER/PLAS: 31 U/L (ref 7–45)
ALBUMIN (G/DL) IN SER/PLAS: 3.8 G/DL (ref 3.4–5)
ALKALINE PHOSPHATASE (U/L) IN SER/PLAS: 170 U/L (ref 33–136)
ANION GAP IN SER/PLAS: 11 MMOL/L (ref 10–20)
ASPARTATE AMINOTRANSFERASE (SGOT) (U/L) IN SER/PLAS: 17 U/L (ref 9–39)
BILIRUBIN TOTAL (MG/DL) IN SER/PLAS: 0.5 MG/DL (ref 0–1.2)
CALCIUM (MG/DL) IN SER/PLAS: 9.1 MG/DL (ref 8.6–10.3)
CARBON DIOXIDE, TOTAL (MMOL/L) IN SER/PLAS: 31 MMOL/L (ref 21–32)
CHLORIDE (MMOL/L) IN SER/PLAS: 104 MMOL/L (ref 98–107)
CREATININE (MG/DL) IN SER/PLAS: 0.41 MG/DL (ref 0.5–1.05)
GFR FEMALE: >90 ML/MIN/1.73M2
GLUCOSE (MG/DL) IN SER/PLAS: 97 MG/DL (ref 74–99)
POTASSIUM (MMOL/L) IN SER/PLAS: 3.9 MMOL/L (ref 3.5–5.3)
PROTEIN TOTAL: 6.3 G/DL (ref 6.4–8.2)
SODIUM (MMOL/L) IN SER/PLAS: 142 MMOL/L (ref 136–145)
THYROPEROXIDASE AB (IU/ML) IN SER/PLAS: 35 IU/ML
TRIIODOTHYRONINE (T3) FREE (PG/ML) IN SER/PLAS: >10 PG/ML (ref 2.3–4.2)
UREA NITROGEN (MG/DL) IN SER/PLAS: 14 MG/DL (ref 6–23)

## 2023-09-13 PROCEDURE — 84432 ASSAY OF THYROGLOBULIN: CPT

## 2023-09-13 PROCEDURE — 80053 COMPREHEN METABOLIC PANEL: CPT

## 2023-09-13 PROCEDURE — 36415 COLL VENOUS BLD VENIPUNCTURE: CPT

## 2023-09-13 PROCEDURE — 86800 THYROGLOBULIN ANTIBODY: CPT

## 2023-09-13 PROCEDURE — 86376 MICROSOMAL ANTIBODY EACH: CPT

## 2023-09-13 PROCEDURE — 84481 FREE ASSAY (FT-3): CPT

## 2023-09-16 LAB
THYROGLOBULIN AB (IU/ML) IN SER/PLAS: <0.9 IU/ML (ref 0–4)
THYROGLOBULIN LC-MS/MS: ABNORMAL NG/ML (ref 1.3–31.8)
THYROGLOBULIN: 350.4 NG/ML (ref 1.3–31.8)

## 2023-09-18 DIAGNOSIS — E05.90 HYPERTHYROIDISM: ICD-10-CM

## 2023-09-18 DIAGNOSIS — R79.9 ABNORMAL BLOOD FINDING: ICD-10-CM

## 2023-09-19 DIAGNOSIS — F41.9 ANXIETY: Primary | ICD-10-CM

## 2023-09-19 RX ORDER — ALPRAZOLAM 0.25 MG/1
0.25 TABLET ORAL 3 TIMES DAILY PRN
Qty: 21 TABLET | Refills: 0 | Status: SHIPPED | OUTPATIENT
Start: 2023-09-19 | End: 2024-05-16

## 2023-09-19 NOTE — PROGRESS NOTES
Reqs anx med  Oarrs reviewed  Overuse and abuse potential discussed with patient (parents if applicable)  If mood deterioration, status change etc on medicine, suicidal or homicidal ideations -patient agrees to proceed with crisis plan-in place-including-not limited to contacting family, our office and or proceeding to ER.    It is recommended that OARRS reports be checked and patient have appointment at least every 3months(6 for certain medicines only)  If the agreement signed (controlled substance agreement) is violated prescriptions may be stopped abruptly and patient /family understands and agrees to this.  Another reason for termination of agreement is if we have concern for abuse or overuse and it is also recommended that patient take responsibility to try to taper and minimize use of these medicines frequently trying to limit or gradually taper to discontinuation.    Patient is aware that side effects such as insomnia, unexpected weight changes are unexpected and should result in discontinuation.  Always use caution and AVOID operating machinery(driving etc) on pain medicines or CNS depressants and avoid combining together OR with alcohol. If opioids are prescribed patient understands the benefits of narcan and was offered prescription.  Follow up sooner if condition deteriorates or problems arise.    Agrees to regular follow and counseling for maximum benefits.  Vv in 1-2 wks post thyroid scan

## 2023-09-20 ENCOUNTER — TELEMEDICINE (OUTPATIENT)
Dept: PRIMARY CARE | Facility: CLINIC | Age: 68
End: 2023-09-20
Payer: MEDICARE

## 2023-09-20 ENCOUNTER — HOSPITAL ENCOUNTER (OUTPATIENT)
Facility: HOSPITAL | Age: 68
Setting detail: OUTPATIENT SURGERY
End: 2023-09-20
Attending: SURGERY | Admitting: SURGERY
Payer: MEDICARE

## 2023-09-20 DIAGNOSIS — E05.90 HYPERTHYROIDISM: Primary | ICD-10-CM

## 2023-09-20 DIAGNOSIS — E78.2 MIXED HYPERLIPIDEMIA: ICD-10-CM

## 2023-09-20 DIAGNOSIS — I10 ESSENTIAL HYPERTENSION: ICD-10-CM

## 2023-09-20 DIAGNOSIS — R73.9 HYPERGLYCEMIA: ICD-10-CM

## 2023-09-20 DIAGNOSIS — I10 ESSENTIAL (PRIMARY) HYPERTENSION: ICD-10-CM

## 2023-09-20 PROBLEM — K80.20 GALLSTONES: Status: ACTIVE | Noted: 2023-09-20

## 2023-09-20 PROCEDURE — 99213 OFFICE O/P EST LOW 20 MIN: CPT | Performed by: FAMILY MEDICINE

## 2023-09-20 RX ORDER — AMLODIPINE BESYLATE 10 MG/1
TABLET ORAL
Qty: 90 TABLET | Refills: 3 | Status: SHIPPED | OUTPATIENT
Start: 2023-09-20 | End: 2023-11-04 | Stop reason: ALTCHOICE

## 2023-09-20 RX ORDER — METHIMAZOLE 10 MG/1
10 TABLET ORAL 2 TIMES DAILY
Qty: 60 TABLET | Refills: 1 | Status: SHIPPED | OUTPATIENT
Start: 2023-09-20 | End: 2023-10-03 | Stop reason: SDUPTHER

## 2023-09-20 NOTE — PROGRESS NOTES
Subjective   Patient ID: Marisol Weiner is a 68 y.o. female who presents and consented for VIRTUAL VISIT ---     VIDEO  Feels a bit anxious  Claudia over test-nuclear scan  Also has cholecystectomy planned  I called her for vv  As I was concerned could not get endo visit until 3-4mo  So I d/w mirlande-we decided to begin low dose tapazole  And rba d/w pt  And thyroid ultrasound to be done    HPI  Patient Active Problem List   Diagnosis    Anxiety    Ductal carcinoma in situ (DCIS) of right breast    Essential hypertension    Hyperglycemia    Mixed hyperlipidemia    Obesity, morbid (CMS/HCC)    Hyperthyroidism    Gallstones       Past Surgical History:   Procedure Laterality Date    BREAST BIOPSY Left 09/2020    fibroadenoma    BREAST BIOPSY Right 06/2021    ductal carcinoma in situ    COLONOSCOPY  01/2023    no polyps-due 2033    HYSTERECTOMY  1990       Review of Systems  This patient has   NO history of recent Covid nor flu symptoms,      NO Fever nor chills,  NO Chest pain, shortness of breath nor paroxysmal nocturnal dyspnea,  NO Nausea, vomiting, nor diarrhea,  NO Hematochezia nor melena,  NO Dysuria, hematuria, nor new incontinence issues  NO new severe headaches nor neurological complaints,  NO new issues with anxiety nor depression nor new psychiatric complaints,  NO suicidal nor homicidal ideations.     OBJECTIVE:  LMP 01/01/1990 (Approximate)      General:  alert, oriented, no acute distress.  Mood is pleasant, not tearful, no signs of emotional distress.  Not appearing intoxicated or altered.   No voiced delusions,   Normal, appropriate behavior.    Some anxiety    Appears to be in baseline health      Lab on 09/13/2023   Component Date Value Ref Range Status    T3, Free 09/13/2023 >10.0 (H)  2.3 - 4.2 pg/mL Final    Glucose 09/13/2023 97  74 - 99 mg/dL Final    Sodium 09/13/2023 142  136 - 145 mmol/L Final    Potassium 09/13/2023 3.9  3.5 - 5.3 mmol/L Final    Chloride 09/13/2023 104  98 - 107 mmol/L Final     Bicarbonate 09/13/2023 31  21 - 32 mmol/L Final    Anion Gap 09/13/2023 11  10 - 20 mmol/L Final    Urea Nitrogen 09/13/2023 14  6 - 23 mg/dL Final    Creatinine 09/13/2023 0.41 (L)  0.50 - 1.05 mg/dL Final    GFR Female 09/13/2023 >90  >90 mL/min/1.73m2 Final     CALCULATIONS OF ESTIMATED GFR ARE PERFORMED   USING THE 2021 CKD-EPI STUDY REFIT EQUATION   WITHOUT THE RACE VARIABLE FOR THE IDMS-TRACEABLE   CREATININE METHODS.    https://jasn.asnjournals.org/content/early/2021/09/22/ASN.7660399950    Calcium 09/13/2023 9.1  8.6 - 10.3 mg/dL Final    Albumin 09/13/2023 3.8  3.4 - 5.0 g/dL Final    Alkaline Phosphatase 09/13/2023 170 (H)  33 - 136 U/L Final    Total Protein 09/13/2023 6.3 (L)  6.4 - 8.2 g/dL Final    AST 09/13/2023 17  9 - 39 U/L Final    Total Bilirubin 09/13/2023 0.5  0.0 - 1.2 mg/dL Final    ALT (SGPT) 09/13/2023 31  7 - 45 U/L Final     Patients treated with Sulfasalazine may generate    falsely decreased results for ALT.    Thyroglobulin 09/13/2023 350.4 (H)  1.3 - 31.8 ng/mL Final    INTERPRETIVE INFORMATION: Thyroglobulin, Serum or Plasma  Specimens negative for thyroglobulin antibodies (TgAb) are tested   for thyroglobulin (Tg) by chemiluminescent immunoassay (CELESTINO) using   the Fredi Alex Access DxI method. Specimens with TgAb results   above the upper reference limit are tested for Tg by   high-performance liquid chromatography-tandem mass spectrometry   (LC-MS/MS). Results obtained with different test methods or kits   cannot be used interchangeably. Tg results, regardless of   concentration, should not be interpreted as absolute evidence for   the presence or absence of papillary or follicular thyroid cancer.   Tg testing is not recommended for use as a screening procedure to   detect the presence of thyroid cancer in the general population.    Thyroglobulin LC-MS/MS 09/13/2023 Not Applicable  1.3 - 31.8 ng/mL Final    INTERPRETIVE INFORMATION: Thyroglobulin by LC-MS/MS,  Serum/Plasma  Lower limit of detection for Thyroglobulin by LC-MS/MS is 0.5   ng/mL.  This test was developed and its performance characteristics   determined by [facility]. It has not been cleared or   approved by the US Food and Drug Administration. This test was   performed in a CLIA certified laboratory and is intended for   clinical purposes.  Performed By: [facility]  [address] [place] [id number]  : Hema Mccray MD, PhD  CLIA Number: [id number]    Anti-Thyroglobulin AB 09/13/2023 <0.9  0.0 - 4.0 IU/mL Final    INTERPRETIVE INFORMATION: Thyroglobulin Antibody                                    A value of 4.0 IU/mL or less indicates a negative result for   thyroglobulin antibodies.  The Thyroglobulin Antibody assay is being performed using the   Mygeni Access DxI method.    Antithyroid Peroxidase Ab 09/13/2023 35  IU/mL Final    Negative: <=60 U/mL  Positive:  >60 U/mL   Orders Only on 09/07/2023   Component Date Value Ref Range Status    Glucose 09/07/2023 92  74 - 99 mg/dL Final    Sodium 09/07/2023 142  136 - 145 mmol/L Final    Potassium 09/07/2023 3.7  3.5 - 5.3 mmol/L Final    Chloride 09/07/2023 105  98 - 107 mmol/L Final    Bicarbonate 09/07/2023 29  21 - 32 mmol/L Final    Anion Gap 09/07/2023 12  10 - 20 mmol/L Final    Urea Nitrogen 09/07/2023 11  6 - 23 mg/dL Final    Creatinine 09/07/2023 0.38 (L)  0.50 - 1.05 mg/dL Final    GFR Female 09/07/2023 >90  >90 mL/min/1.73m2 Final    Comment:  CALCULATIONS OF ESTIMATED GFR ARE PERFORMED   USING THE 2021 CKD-EPI STUDY REFIT EQUATION   WITHOUT THE RACE VARIABLE FOR THE IDMS-TRACEABLE   CREATININE METHODS.    https://jasn.asnjournals.org/content/early/2021/09/22/ASN.6226009848      Calcium 09/07/2023 9.8  8.6 - 10.3 mg/dL Final    TSH 09/07/2023 <0.01 (L)  0.44 - 3.98 mIU/L Final    Comment:  TSH testing is performed using different testing    methodology at [facility] than at other     [facility]. Direct result comparisons should    only be made within the same method.      Free T4 09/07/2023 4.67 (H)  0.61 - 1.12 ng/dL Final    Comment:  Thyroxine Free testing is performed using different testing    methodology at [facility] than at other    [facility]. Direct result comparisons should    only be made within the same method.  .   Biotin can cause falsely elevated free T4 results. Patients   taking a Biotin dose of up to 10 mg/day should refrain from   taking Biotin for 24 hours before sample collection. Patient   taking a Biotin dose of >10 mg/day should consult with their   physician or the laboratory before the blood draw.     Lab on 08/15/2023   Component Date Value Ref Range Status    Vit D, 1,25-Dihydroxy 08/15/2023 50.2  19.9 - 79.3 pg/mL Final    INTERPRETIVE INFORMATION: Vitamin D, 1,25-Dihydroxy  This test is primarily indicated during patient evaluation for   hypercalcemia and renal failure. A normal result does not rule out   Vitamin D deficiency. The recommended test for diagnosing Vitamin   D deficiency is Vitamin D 25-hydroxy.  Performed By: [facility]  [address] [place], [place] [id number]  : Hema Mccray MD, PhD  CLIA Number: [id number]   Lab on 08/08/2023   Component Date Value Ref Range Status    Glucose 08/08/2023 89  74 - 99 mg/dL Final    Sodium 08/08/2023 142  136 - 145 mmol/L Final    Potassium 08/08/2023 3.7  3.5 - 5.3 mmol/L Final    Chloride 08/08/2023 105  98 - 107 mmol/L Final    Bicarbonate 08/08/2023 27  21 - 32 mmol/L Final    Anion Gap 08/08/2023 14  10 - 20 mmol/L Final    Urea Nitrogen 08/08/2023 14  6 - 23 mg/dL Final    Creatinine 08/08/2023 0.39 (L)  0.50 - 1.05 mg/dL Final    GFR Female 08/08/2023 >90  >90 mL/min/1.73m2 Final     CALCULATIONS OF ESTIMATED GFR ARE PERFORMED   USING THE 2021 CKD-EPI STUDY REFIT EQUATION   WITHOUT THE RACE VARIABLE FOR THE IDMS-TRACEABLE   CREATININE  METHODS.    https://jasn.asnjournals.org/content/early/2021/09/22/ASN.9550519892    Calcium 08/08/2023 10.3  8.6 - 10.3 mg/dL Final    Albumin 08/08/2023 3.9  3.4 - 5.0 g/dL Final    Alkaline Phosphatase 08/08/2023 171 (H)  33 - 136 U/L Final    Total Protein 08/08/2023 7.3  6.4 - 8.2 g/dL Final    AST 08/08/2023 19  9 - 39 U/L Final    Total Bilirubin 08/08/2023 0.5  0.0 - 1.2 mg/dL Final    ALT (SGPT) 08/08/2023 25  7 - 45 U/L Final     Patients treated with Sulfasalazine may generate    falsely decreased results for ALT.    Liver Fraction: 08/08/2023 74  0 - 94 U/L Final    INTERPRETIVE INFORMATION: Alk-Phosphatase Liver Calc  Bone Specific Alkaline Phosphatase (0836138) and 5'-nucleotidase   (5814793) may be useful in identifying disorders of bone and   liver, respectively.    Bone Fraction: 08/08/2023 117 (H)  0 - 55 U/L Final    Alkaline Phosphatase, Other 08/08/2023 0  U/L Final    Performed By: [facility]  [address] [place] [id number]  : Hema Mccray MD, PhD  CLIA Number: [id number]    Alkaline Phosphatase 08/08/2023 191 (H)  40 - 120 U/L Final    Ferritin 08/08/2023 852 (H)  8 - 150 ug/L Final    Vitamin B-12 08/08/2023 610  211 - 911 pg/mL Final    EBV VCA IgG Antibody 08/08/2023 POSITIVE (A)  NEGATIVE Final    LARY-PATRICIA VIRUS EARLY ANTIGEN A* 08/08/2023 NEGATIVE  NEGATIVE Final    EBV Nuclear Ag Ab 08/08/2023 POSITIVE (A)  NEGATIVE Final    EBV VCA IgM Antibody 08/08/2023 NEGATIVE  NEGATIVE Final    EBV Interpretation 08/08/2023 SEE BELOW   Final    .                       EBV INTERPRETATION CHART  .                 VCA-IGG  VCA-IGM  NA-IGG  EA-IGG  .                 --------------------------------  PRIMARY ACUTE       +/-      +/-      -      +/-  LATE ACUTE           +       +/-     +/-     +/-  RECOVERING           +        -       -       +  PREVIOUS INFECTION   +        -      +/-      -   Lab on 08/03/2023   Component Date Value Ref Range Status     Glucose 08/03/2023 77  74 - 99 mg/dL Final    Sodium 08/03/2023 143  136 - 145 mmol/L Final    Potassium 08/03/2023 4.3  3.5 - 5.3 mmol/L Final    Chloride 08/03/2023 105  98 - 107 mmol/L Final    Bicarbonate 08/03/2023 28  21 - 32 mmol/L Final    Anion Gap 08/03/2023 14  10 - 20 mmol/L Final    Urea Nitrogen 08/03/2023 16  6 - 23 mg/dL Final    Creatinine 08/03/2023 0.33 (L)  0.50 - 1.05 mg/dL Final    GFR Female 08/03/2023 >90  >90 mL/min/1.73m2 Final     CALCULATIONS OF ESTIMATED GFR ARE PERFORMED   USING THE 2021 CKD-EPI STUDY REFIT EQUATION   WITHOUT THE RACE VARIABLE FOR THE IDMS-TRACEABLE   CREATININE METHODS.    https://jasn.asnjournals.org/content/early/2021/09/22/ASN.0559548551    Calcium 08/03/2023 10.2  8.6 - 10.6 mg/dL Final    Albumin 08/03/2023 3.9  3.4 - 5.0 g/dL Final    Alkaline Phosphatase 08/03/2023 174 (H)  33 - 136 U/L Final    Total Protein 08/03/2023 7.0  6.4 - 8.2 g/dL Final    AST 08/03/2023 15  9 - 39 U/L Final    Total Bilirubin 08/03/2023 0.5  0.0 - 1.2 mg/dL Final    ALT (SGPT) 08/03/2023 21  7 - 45 U/L Final     Patients treated with Sulfasalazine may generate    falsely decreased results for ALT.    Cholesterol 08/03/2023 130  0 - 199 mg/dL Final    .      AGE      DESIRABLE   BORDERLINE HIGH   HIGH     0-19 Y     0 - 169       170 - 199     >/= 200    20-24 Y     0 - 189       190 - 224     >/= 225         >24 Y     0 - 199       200 - 239     >/= 240   **All ranges are based on fasting samples. Specific   therapeutic targets will vary based on patient-specific   cardiac risk.  .   Pediatric guidelines reference:Pediatrics 2011, 128(S5).   Adult guidelines reference: NCEP ATPIII Guidelines,     MARCO 2001, 258:2486-97  .   Venipuncture immediately after or during the    administration of Metamizole may lead to falsely   low results. Testing should be performed immediately   prior to Metamizole dosing.    HDL 08/03/2023 50.6  mg/dL Final    .      AGE      VERY LOW   LOW     NORMAL     HIGH       0-19 Y       < 35   < 40     40-45     ----    20-24 Y       ----   < 40       >45     ----      >24 Y       ----   < 40     40-60      >60  .    Cholesterol/HDL Ratio 08/03/2023 2.6   Final    REF VALUES  DESIRABLE  < 3.4  HIGH RISK  > 5.0    LDL 08/03/2023 65  0 - 99 mg/dL Final    .                           NEAR      BORD      AGE      DESIRABLE  OPTIMAL    HIGH     HIGH     VERY HIGH     0-19 Y     0 - 109     ---    110-129   >/= 130     ----    20-24 Y     0 - 119     ---    120-159   >/= 160     ----      >24 Y     0 -  99   100-129  130-159   160-189     >/=190  .    VLDL 08/03/2023 15  0 - 40 mg/dL Final    Triglycerides 08/03/2023 74  0 - 149 mg/dL Final    .      AGE      DESIRABLE   BORDERLINE HIGH   HIGH     VERY HIGH   0 D-90 D    19 - 174         ----         ----        ----  91 D- 9 Y     0 -  74        75 -  99     >/= 100      ----    10-19 Y     0 -  89        90 - 129     >/= 130      ----    20-24 Y     0 - 114       115 - 149     >/= 150      ----         >24 Y     0 - 149       150 - 199    200- 499    >/= 500  .   Venipuncture immediately after or during the    administration of Metamizole may lead to falsely   low results. Testing should be performed immediately   prior to Metamizole dosing.    WBC 08/03/2023 4.6  4.4 - 11.3 x10E9/L Final    nRBC 08/03/2023 0.0  0.0 - 0.0 /100 WBC Final    RBC 08/03/2023 5.42 (H)  4.00 - 5.20 x10E12/L Final    Hemoglobin 08/03/2023 13.5  12.0 - 16.0 g/dL Final    Hematocrit 08/03/2023 41.9  36.0 - 46.0 % Final    MCV 08/03/2023 77 (L)  80 - 100 fL Final    MCHC 08/03/2023 32.2  32.0 - 36.0 g/dL Final    Platelets 08/03/2023 245  150 - 450 x10E9/L Final    RDW 08/03/2023 12.9  11.5 - 14.5 % Final    Hemoglobin A1C 08/03/2023 5.1  % Final         Diagnosis of Diabetes-Adults   Non-Diabetic: < or = 5.6%   Increased risk for developing diabetes: 5.7-6.4%   Diagnostic of diabetes: > or = 6.5%  .       Monitoring of Diabetes                Age (y)      Therapeutic Goal (%)   Adults:          >18           <7.0   Pediatrics:    13-18           <7.5                   7-12           <8.0                   0- 6            7.5-8.5   American Diabetes Association. Diabetes Care 33(S1), Jan 2010.    Estimated Average Glucose 08/03/2023 100  MG/DL Final        Assessment/Plan     Problem List Items Addressed This Visit       Essential hypertension    Relevant Orders    Comprehensive Metabolic Panel    Thyroid Stimulating Hormone    Thyroxine, Free    T3, free    CBC and Auto Differential    Hyperglycemia    Relevant Orders    Comprehensive Metabolic Panel    Thyroid Stimulating Hormone    Thyroxine, Free    T3, free    CBC and Auto Differential    Mixed hyperlipidemia    Relevant Orders    Comprehensive Metabolic Panel    Thyroid Stimulating Hormone    Thyroxine, Free    T3, free    CBC and Auto Differential    Hyperthyroidism - Primary    Relevant Medications    methIMAzole (Tapazole) 10 mg tablet    Other Relevant Orders    US thyroid    Comprehensive Metabolic Panel    Thyroid Stimulating Hormone    Thyroxine, Free    T3, free    CBC and Auto Differential       Patient is aware of LIMITATIONS of VIRTUAL VISITS and how-of course-an IN PERSON VISIT IS always ideal or preferred. IF condition deteriorates from here-ER IS DEFINITELY ADVISED.  In 6wks tsh ft4 ft3 and cmp cbc  Sooner if issues  Xanax every day prn if overly anxious  Discussed w pt over options of tapazole  This medications risks, benefits, and alternatives were discussed with patient at length.  If any unwanted side effects occur-discontinue medicine and call the office for discussion.  Of note endo appt 10-3  Overuse and abuse potential discussed with patient (parents if applicable)  If mood deterioration, status change etc on medicine, suicidal or homicidal ideations -patient agrees to proceed with crisis plan-in place-including-not limited to contacting family, our office and or proceeding to ER.    It is  recommended that [place]AR[place] reports be checked and patient have appointment at least every 3months(6 for certain medicines only)  If the agreement signed (controlled substance agreement) is violated prescriptions may be stopped abruptly and patient /family understands and agrees to this.  Another reason for termination of agreement is if we have concern for abuse or overuse and it is also recommended that patient take responsibility to try to taper and minimize use of these medicines frequently trying to limit or gradually taper to discontinuation.    Patient is aware that side effects such as insomnia, unexpected weight changes are unexpected and should result in discontinuation.  Always use caution and AVOID operating machinery(driving etc) on pain medicines or CNS depressants and avoid combining together OR with alcohol. If opioids are prescribed patient understands the benefits of narcan and was offered prescription.  Follow up sooner if condition deteriorates or problems arise.    Agrees to regular follow and counseling for maximum benefits.

## 2023-09-25 ENCOUNTER — TELEPHONE (OUTPATIENT)
Dept: PRIMARY CARE | Facility: CLINIC | Age: 68
End: 2023-09-25
Payer: MEDICARE

## 2023-09-25 NOTE — TELEPHONE ENCOUNTER
Jenae pt was scheduled for Thyroid uptake scan today but was cancelled as she is supposed to be off of her thyroid meds for 5 days ( Tapazole ). Pt is calling to see if this is what you want her to do and then reschedule the scan?? Pt can be reached at [phone]

## 2023-09-28 PROBLEM — R63.4 WEIGHT LOSS: Status: ACTIVE | Noted: 2023-09-28

## 2023-09-29 ENCOUNTER — TELEMEDICINE (OUTPATIENT)
Dept: PRIMARY CARE | Facility: CLINIC | Age: 68
End: 2023-09-29
Payer: MEDICARE

## 2023-09-29 VITALS — BODY MASS INDEX: 30.21 KG/M2 | HEIGHT: 61 IN | RESPIRATION RATE: 16 BRPM | WEIGHT: 160 LBS

## 2023-09-29 DIAGNOSIS — R73.9 HYPERGLYCEMIA: ICD-10-CM

## 2023-09-29 DIAGNOSIS — E05.90 HYPERTHYROIDISM: ICD-10-CM

## 2023-09-29 DIAGNOSIS — I10 ESSENTIAL HYPERTENSION: Primary | ICD-10-CM

## 2023-09-29 DIAGNOSIS — R63.4 WEIGHT LOSS: ICD-10-CM

## 2023-09-29 DIAGNOSIS — K80.20 GALLSTONES: ICD-10-CM

## 2023-09-29 DIAGNOSIS — E78.2 MIXED HYPERLIPIDEMIA: ICD-10-CM

## 2023-09-29 DIAGNOSIS — D05.11 DUCTAL CARCINOMA IN SITU (DCIS) OF RIGHT BREAST: ICD-10-CM

## 2023-09-29 PROCEDURE — 99213 OFFICE O/P EST LOW 20 MIN: CPT | Performed by: FAMILY MEDICINE

## 2023-09-29 RX ORDER — SPIRONOLACTONE 25 MG/1
1 TABLET ORAL DAILY
COMMUNITY
Start: 2023-09-06 | End: 2024-02-06 | Stop reason: SDUPTHER

## 2023-09-29 NOTE — PROGRESS NOTES
"Subjective   Patient ID: Marisol Weiner is a 68 y.o. female who presents and consented for VIRTUAL VISIT ---     VIDEO  Covid vax: x 3  CRC: due 2033  Mammogram: 7/2023  Pap: hysterectomy  Lmp: hysterectomy  Bp 115/79    And didn't feel much difft on tapazole-off now  Scan next wk    HPI  Patient Active Problem List   Diagnosis    Anxiety    Ductal carcinoma in situ (DCIS) of right breast    Essential hypertension    Hyperglycemia    Mixed hyperlipidemia    Obesity, morbid (CMS/HCC)    Hyperthyroidism    Gallstones    Weight loss       Past Surgical History:   Procedure Laterality Date    BREAST BIOPSY Left 09/2020    fibroadenoma    BREAST BIOPSY Right 06/2021    ductal carcinoma in situ    COLONOSCOPY  01/2023    no polyps-due 2033    HYSTERECTOMY  1990       Review of Systems  This patient has   NO history of recent Covid nor flu symptoms,      NO Fever nor chills,  NO Chest pain, shortness of breath nor paroxysmal nocturnal dyspnea,  NO Nausea, vomiting, nor diarrhea,  NO Hematochezia nor melena,  NO Dysuria, hematuria, nor new incontinence issues  NO new severe headaches nor neurological complaints,  NO new issues with anxiety nor depression nor new psychiatric complaints,  NO suicidal nor homicidal ideations.     OBJECTIVE:  Resp 16   Ht 1.549 m (5' 1\")   Wt 72.6 kg (160 lb)   LMP 01/01/1990 (Approximate)   BMI 30.23 kg/m²      General:  alert, oriented, no acute distress.  Mood is pleasant, not tearful, no signs of emotional distress.  Not appearing intoxicated or altered.   No voiced delusions,   Normal, appropriate behavior.    HEENT: Normocephalic, atraumatic,   Pupils round, reactive to light  Extraocular motions intact and wnl    Conversing sans difficulty does NOT appear to be short of breath-in severe pain or toxic appearing    Appears to be in baseline health      Lab on 09/13/2023   Component Date Value Ref Range Status    T3, Free 09/13/2023 >10.0 (H)  2.3 - 4.2 pg/mL Final    Glucose " 09/13/2023 97  74 - 99 mg/dL Final    Sodium 09/13/2023 142  136 - 145 mmol/L Final    Potassium 09/13/2023 3.9  3.5 - 5.3 mmol/L Final    Chloride 09/13/2023 104  98 - 107 mmol/L Final    Bicarbonate 09/13/2023 31  21 - 32 mmol/L Final    Anion Gap 09/13/2023 11  10 - 20 mmol/L Final    Urea Nitrogen 09/13/2023 14  6 - 23 mg/dL Final    Creatinine 09/13/2023 0.41 (L)  0.50 - 1.05 mg/dL Final    GFR Female 09/13/2023 >90  >90 mL/min/1.73m2 Final     CALCULATIONS OF ESTIMATED GFR ARE PERFORMED   USING THE 2021 CKD-EPI STUDY REFIT EQUATION   WITHOUT THE RACE VARIABLE FOR THE IDMS-TRACEABLE   CREATININE METHODS.    https://jasn.asnjournals.org/content/early/2021/09/22/ASN.2136035688    Calcium 09/13/2023 9.1  8.6 - 10.3 mg/dL Final    Albumin 09/13/2023 3.8  3.4 - 5.0 g/dL Final    Alkaline Phosphatase 09/13/2023 170 (H)  33 - 136 U/L Final    Total Protein 09/13/2023 6.3 (L)  6.4 - 8.2 g/dL Final    AST 09/13/2023 17  9 - 39 U/L Final    Total Bilirubin 09/13/2023 0.5  0.0 - 1.2 mg/dL Final    ALT (SGPT) 09/13/2023 31  7 - 45 U/L Final     Patients treated with Sulfasalazine may generate    falsely decreased results for ALT.    Thyroglobulin 09/13/2023 350.4 (H)  1.3 - 31.8 ng/mL Final    INTERPRETIVE INFORMATION: Thyroglobulin, Serum or Plasma  Specimens negative for thyroglobulin antibodies (TgAb) are tested   for thyroglobulin (Tg) by chemiluminescent immunoassay (CELESTINO) using   the Fredi Loop[employer] Access DxI method. Specimens with TgAb results   above the upper reference limit are tested for Tg by   high-performance liquid chromatography-tandem mass spectrometry   (LC-MS/MS). Results obtained with different test methods or kits   cannot be used interchangeably. Tg results, regardless of   concentration, should not be interpreted as absolute evidence for   the presence or absence of papillary or follicular thyroid cancer.   Tg testing is not recommended for use as a screening procedure to   detect the presence of  thyroid cancer in the general population.    Thyroglobulin LC-MS/MS 09/13/2023 Not Applicable  1.3 - 31.8 ng/mL Final    INTERPRETIVE INFORMATION: Thyroglobulin by LC-MS/MS, Serum/Plasma  Lower limit of detection for Thyroglobulin by LC-MS/MS is 0.5   ng/mL.  This test was developed and its performance characteristics   determined by [facility]. It has not been cleared or   approved by the US Food and Drug Administration. This test was   performed in a CLIA certified laboratory and is intended for   clinical purposes.  Performed By: [facility]  [address] [place] [id number]  : Hema Mccray MD, PhD  CLIA Number: [id number]    Anti-Thyroglobulin AB 09/13/2023 <0.9  0.0 - 4.0 IU/mL Final    INTERPRETIVE INFORMATION: Thyroglobulin Antibody                                    A value of 4.0 IU/mL or less indicates a negative result for   thyroglobulin antibodies.  The Thyroglobulin Antibody assay is being performed using the   Fredi Ackworth Access DxI method.    Antithyroid Peroxidase Ab 09/13/2023 35  IU/mL Final    Negative: <=60 U/mL  Positive:  >60 U/mL   Orders Only on 09/07/2023   Component Date Value Ref Range Status    Glucose 09/07/2023 92  74 - 99 mg/dL Final    Sodium 09/07/2023 142  136 - 145 mmol/L Final    Potassium 09/07/2023 3.7  3.5 - 5.3 mmol/L Final    Chloride 09/07/2023 105  98 - 107 mmol/L Final    Bicarbonate 09/07/2023 29  21 - 32 mmol/L Final    Anion Gap 09/07/2023 12  10 - 20 mmol/L Final    Urea Nitrogen 09/07/2023 11  6 - 23 mg/dL Final    Creatinine 09/07/2023 0.38 (L)  0.50 - 1.05 mg/dL Final    GFR Female 09/07/2023 >90  >90 mL/min/1.73m2 Final    Comment:  CALCULATIONS OF ESTIMATED GFR ARE PERFORMED   USING THE 2021 CKD-EPI STUDY REFIT EQUATION   WITHOUT THE RACE VARIABLE FOR THE IDMS-TRACEABLE   CREATININE METHODS.    https://jasn.asnjournals.org/content/early/2021/09/22/ASN.7697231877      Calcium 09/07/2023 9.8  8.6 - 10.3 mg/dL  Final    TSH 09/07/2023 <0.01 (L)  0.44 - 3.98 mIU/L Final    Comment:  TSH testing is performed using different testing    methodology at [facility] than at other    system [facility]. Direct result comparisons should    only be made within the same method.      Free T4 09/07/2023 4.67 (H)  0.61 - 1.12 ng/dL Final    Comment:  Thyroxine Free testing is performed using different testing    methodology at [facility] than at other    [facility]. Direct result comparisons should    only be made within the same method.  .   Biotin can cause falsely elevated free T4 results. Patients   taking a Biotin dose of up to 10 mg/day should refrain from   taking Biotin for 24 hours before sample collection. Patient   taking a Biotin dose of >10 mg/day should consult with their   physician or the laboratory before the blood draw.     Lab on 08/15/2023   Component Date Value Ref Range Status    Vit D, 1,25-Dihydroxy 08/15/2023 50.2  19.9 - 79.3 pg/mL Final    INTERPRETIVE INFORMATION: Vitamin D, 1,25-Dihydroxy  This test is primarily indicated during patient evaluation for   hypercalcemia and renal failure. A normal result does not rule out   Vitamin D deficiency. The recommended test for diagnosing Vitamin   D deficiency is Vitamin D 25-hydroxy.  Performed By: [facility]  [address] [place] [id number]  : Hema Mccray MD, PhD  CLIA Number: [id number]   Lab on 08/08/2023   Component Date Value Ref Range Status    Glucose 08/08/2023 89  74 - 99 mg/dL Final    Sodium 08/08/2023 142  136 - 145 mmol/L Final    Potassium 08/08/2023 3.7  3.5 - 5.3 mmol/L Final    Chloride 08/08/2023 105  98 - 107 mmol/L Final    Bicarbonate 08/08/2023 27  21 - 32 mmol/L Final    Anion Gap 08/08/2023 14  10 - 20 mmol/L Final    Urea Nitrogen 08/08/2023 14  6 - 23 mg/dL Final    Creatinine 08/08/2023 0.39 (L)  0.50 - 1.05 mg/dL Final    GFR Female 08/08/2023 >90  >90 mL/min/1.73m2  Final     CALCULATIONS OF ESTIMATED GFR ARE PERFORMED   USING THE 2021 CKD-EPI STUDY REFIT EQUATION   WITHOUT THE RACE VARIABLE FOR THE IDMS-TRACEABLE   CREATININE METHODS.    https://jasn.asnjournals.org/content/early/2021/09/22/ASN.7691644496    Calcium 08/08/2023 10.3  8.6 - 10.3 mg/dL Final    Albumin 08/08/2023 3.9  3.4 - 5.0 g/dL Final    Alkaline Phosphatase 08/08/2023 171 (H)  33 - 136 U/L Final    Total Protein 08/08/2023 7.3  6.4 - 8.2 g/dL Final    AST 08/08/2023 19  9 - 39 U/L Final    Total Bilirubin 08/08/2023 0.5  0.0 - 1.2 mg/dL Final    ALT (SGPT) 08/08/2023 25  7 - 45 U/L Final     Patients treated with Sulfasalazine may generate    falsely decreased results for ALT.    Liver Fraction: 08/08/2023 74  0 - 94 U/L Final    INTERPRETIVE INFORMATION: Alk-Phosphatase Liver Calc  Bone Specific Alkaline Phosphatase (4047896) and 5'-nucleotidase   ([id number]) may be useful in identifying disorders of bone and   liver, respectively.    Bone Fraction: 08/08/2023 117 (H)  0 - 55 U/L Final    Alkaline Phosphatase, Other 08/08/2023 0  U/L Final    Performed By: [facility]  [address] [place] [id number]  : Hema Mccray MD, PhD  CLIA Number: [id number]    Alkaline Phosphatase 08/08/2023 191 (H)  40 - 120 U/L Final    Ferritin 08/08/2023 852 (H)  8 - 150 ug/L Final    Vitamin B-12 08/08/2023 610  211 - 911 pg/mL Final    EBV VCA IgG Antibody 08/08/2023 POSITIVE (A)  NEGATIVE Final    LARY-PATRICIA VIRUS EARLY ANTIGEN A* 08/08/2023 NEGATIVE  NEGATIVE Final    EBV Nuclear Ag Ab 08/08/2023 POSITIVE (A)  NEGATIVE Final    EBV VCA IgM Antibody 08/08/2023 NEGATIVE  NEGATIVE Final    EBV Interpretation 08/08/2023 SEE BELOW   Final    .                       EBV INTERPRETATION CHART  .                 VCA-IGG  VCA-IGM  NA-IGG  EA-IGG  .                 --------------------------------  PRIMARY ACUTE       +/-      +/-      -      +/-  LATE ACUTE           +       +/-     +/-      +/-  RECOVERING           +        -       -       +  PREVIOUS INFECTION   +        -      +/-      -   Lab on 08/03/2023   Component Date Value Ref Range Status    Glucose 08/03/2023 77  74 - 99 mg/dL Final    Sodium 08/03/2023 143  136 - 145 mmol/L Final    Potassium 08/03/2023 4.3  3.5 - 5.3 mmol/L Final    Chloride 08/03/2023 105  98 - 107 mmol/L Final    Bicarbonate 08/03/2023 28  21 - 32 mmol/L Final    Anion Gap 08/03/2023 14  10 - 20 mmol/L Final    Urea Nitrogen 08/03/2023 16  6 - 23 mg/dL Final    Creatinine 08/03/2023 0.33 (L)  0.50 - 1.05 mg/dL Final    GFR Female 08/03/2023 >90  >90 mL/min/1.73m2 Final     CALCULATIONS OF ESTIMATED GFR ARE PERFORMED   USING THE 2021 CKD-EPI STUDY REFIT EQUATION   WITHOUT THE RACE VARIABLE FOR THE IDMS-TRACEABLE   CREATININE METHODS.    https://jasn.asnjournals.org/content/early/2021/09/22/ASN.1747534723    Calcium 08/03/2023 10.2  8.6 - 10.6 mg/dL Final    Albumin 08/03/2023 3.9  3.4 - 5.0 g/dL Final    Alkaline Phosphatase 08/03/2023 174 (H)  33 - 136 U/L Final    Total Protein 08/03/2023 7.0  6.4 - 8.2 g/dL Final    AST 08/03/2023 15  9 - 39 U/L Final    Total Bilirubin 08/03/2023 0.5  0.0 - 1.2 mg/dL Final    ALT (SGPT) 08/03/2023 21  7 - 45 U/L Final     Patients treated with Sulfasalazine may generate    falsely decreased results for ALT.    Cholesterol 08/03/2023 130  0 - 199 mg/dL Final    .      AGE      DESIRABLE   BORDERLINE HIGH   HIGH     0-19 Y     0 - 169       170 - 199     >/= 200    20-24 Y     0 - 189       190 - 224     >/= 225         >24 Y     0 - 199       200 - 239     >/= 240   **All ranges are based on fasting samples. Specific   therapeutic targets will vary based on patient-specific   cardiac risk.  .   Pediatric guidelines reference:Pediatrics 2011, 128(S5).   Adult guidelines reference: NCEP ATPIII Guidelines,     MARCO 2001, 258:2486-97  .   Venipuncture immediately after or during the    administration of Metamizole may lead to falsely    low results. Testing should be performed immediately   prior to Metamizole dosing.    HDL 08/03/2023 50.6  mg/dL Final    .      AGE      VERY LOW   LOW     NORMAL    HIGH       0-19 Y       < 35   < 40     40-45     ----    20-24 Y       ----   < 40       >45     ----      >24 Y       ----   < 40     40-60      >60  .    Cholesterol/HDL Ratio 08/03/2023 2.6   Final    REF VALUES  DESIRABLE  < 3.4  HIGH RISK  > 5.0    LDL 08/03/2023 65  0 - 99 mg/dL Final    .                           NEAR      BORD      AGE      DESIRABLE  OPTIMAL    HIGH     HIGH     VERY HIGH     0-19 Y     0 - 109     ---    110-129   >/= 130     ----    20-24 Y     0 - 119     ---    120-159   >/= 160     ----      >24 Y     0 -  99   100-129  130-159   160-189     >/=190  .    VLDL 08/03/2023 15  0 - 40 mg/dL Final    Triglycerides 08/03/2023 74  0 - 149 mg/dL Final    .      AGE      DESIRABLE   BORDERLINE HIGH   HIGH     VERY HIGH   0 D-90 D    19 - 174         ----         ----        ----  91 D- 9 Y     0 -  74        75 -  99     >/= 100      ----    10-19 Y     0 -  89        90 - 129     >/= 130      ----    20-24 Y     0 - 114       115 - 149     >/= 150      ----         >24 Y     0 - 149       150 - 199    200- 499    >/= 500  .   Venipuncture immediately after or during the    administration of Metamizole may lead to falsely   low results. Testing should be performed immediately   prior to Metamizole dosing.    WBC 08/03/2023 4.6  4.4 - 11.3 x10E9/L Final    nRBC 08/03/2023 0.0  0.0 - 0.0 /100 WBC Final    RBC 08/03/2023 5.42 (H)  4.00 - 5.20 x10E12/L Final    Hemoglobin 08/03/2023 13.5  12.0 - 16.0 g/dL Final    Hematocrit 08/03/2023 41.9  36.0 - 46.0 % Final    MCV 08/03/2023 77 (L)  80 - 100 fL Final    MCHC 08/03/2023 32.2  32.0 - 36.0 g/dL Final    Platelets 08/03/2023 245  150 - 450 x10E9/L Final    RDW 08/03/2023 12.9  11.5 - 14.5 % Final    Hemoglobin A1C 08/03/2023 5.1  % Final         Diagnosis of Diabetes-Adults    Non-Diabetic: < or = 5.6%   Increased risk for developing diabetes: 5.7-6.4%   Diagnostic of diabetes: > or = 6.5%  .       Monitoring of Diabetes                Age (y)     Therapeutic Goal (%)   Adults:          >18           <7.0   Pediatrics:    13-18           <7.5                   7-12           <8.0                   0- 6            7.5-8.5   American Diabetes Association. Diabetes Care 33(S1), Jan 2010.    Estimated Average Glucose 08/03/2023 100  MG/DL Final        Assessment/Plan     Problem List Items Addressed This Visit       Ductal carcinoma in situ (DCIS) of right breast    Essential hypertension - Primary    Hyperglycemia    Mixed hyperlipidemia    Hyperthyroidism    Gallstones    Weight loss     No sxs at this time  No longer losing wt rapidly  Now 162 # (down 12#)  Advised to consult endo as when to return to tapazole    Patient is aware of LIMITATIONS of VIRTUAL VISITS and how-of course-an IN PERSON VISIT IS always ideal or preferred. IF condition deteriorates from here-ER IS DEFINITELY ADVISED.  Doing well  10-10 melia planned  10-3 sees endo  See me 8wks -vv ok  Rarely takes alprazolam

## 2023-09-30 ENCOUNTER — HOSPITAL ENCOUNTER (OUTPATIENT)
Dept: RADIOLOGY | Facility: HOSPITAL | Age: 68
Discharge: HOME | End: 2023-09-30
Payer: MEDICARE

## 2023-09-30 DIAGNOSIS — E05.90 HYPERTHYROIDISM: ICD-10-CM

## 2023-09-30 DIAGNOSIS — R79.9 ABNORMAL BLOOD FINDING: ICD-10-CM

## 2023-09-30 PROBLEM — R20.0 NUMBNESS IN LEFT LEG: Status: ACTIVE | Noted: 2023-09-30

## 2023-09-30 PROBLEM — R68.81 EARLY SATIETY: Status: ACTIVE | Noted: 2023-09-30

## 2023-09-30 PROBLEM — R53.83 FATIGUE: Status: ACTIVE | Noted: 2023-09-30

## 2023-09-30 PROBLEM — E66.9 CLASS 1 OBESITY WITH BODY MASS INDEX (BMI) OF 31.0 TO 31.9 IN ADULT: Status: ACTIVE | Noted: 2023-08-08

## 2023-09-30 PROBLEM — R11.0 NAUSEA IN ADULT: Status: ACTIVE | Noted: 2023-09-30

## 2023-09-30 PROBLEM — D24.1 FIBROADENOMA OF RIGHT BREAST: Status: ACTIVE | Noted: 2023-09-30

## 2023-09-30 PROBLEM — R00.0 TACHYCARDIA: Status: ACTIVE | Noted: 2023-09-30

## 2023-09-30 PROBLEM — M25.552 HIP PAIN, LEFT: Status: ACTIVE | Noted: 2023-09-30

## 2023-09-30 PROBLEM — R01.1 HEART MURMUR, SYSTOLIC: Status: ACTIVE | Noted: 2023-09-30

## 2023-09-30 PROBLEM — E66.811 CLASS 1 OBESITY WITH BODY MASS INDEX (BMI) OF 31.0 TO 31.9 IN ADULT: Status: ACTIVE | Noted: 2023-08-08

## 2023-09-30 PROBLEM — R31.29 OTHER MICROSCOPIC HEMATURIA: Status: ACTIVE | Noted: 2023-09-30

## 2023-09-30 PROBLEM — R43.0 ANOSMIA: Status: ACTIVE | Noted: 2023-09-30

## 2023-09-30 PROBLEM — R42 DIZZINESS: Status: ACTIVE | Noted: 2023-09-30

## 2023-09-30 PROBLEM — R00.2 PALPITATIONS: Status: ACTIVE | Noted: 2023-09-30

## 2023-09-30 PROBLEM — D22.9 ATYPICAL MOLE: Status: ACTIVE | Noted: 2023-09-30

## 2023-09-30 PROCEDURE — 76536 US EXAM OF HEAD AND NECK: CPT

## 2023-09-30 PROCEDURE — 76536 US EXAM OF HEAD AND NECK: CPT | Performed by: RADIOLOGY

## 2023-09-30 RX ORDER — METOPROLOL SUCCINATE 50 MG/1
1 TABLET, EXTENDED RELEASE ORAL DAILY
COMMUNITY
Start: 2023-09-06 | End: 2023-11-08 | Stop reason: DRUGHIGH

## 2023-09-30 RX ORDER — ACETAMINOPHEN 500 MG
1 TABLET ORAL DAILY
COMMUNITY

## 2023-09-30 RX ORDER — LANOLIN ALCOHOL/MO/W.PET/CERES
1 CREAM (GRAM) TOPICAL 2 TIMES DAILY
COMMUNITY
Start: 2023-09-06 | End: 2024-02-06 | Stop reason: SINTOL

## 2023-09-30 RX ORDER — AMLODIPINE BESYLATE 5 MG/1
5 TABLET ORAL DAILY
COMMUNITY
Start: 2023-09-06 | End: 2023-11-08 | Stop reason: DRUGHIGH

## 2023-10-02 ENCOUNTER — HOSPITAL ENCOUNTER (OUTPATIENT)
Dept: RADIOLOGY | Facility: HOSPITAL | Age: 68
Discharge: HOME | End: 2023-10-02
Payer: MEDICARE

## 2023-10-02 DIAGNOSIS — E05.90 THYROTOXICOSIS, UNSPECIFIED WITHOUT THYROTOXIC CRISIS OR STORM: ICD-10-CM

## 2023-10-02 PROCEDURE — 78830 RP LOCLZJ TUM SPECT W/CT 1: CPT | Performed by: RADIOLOGY

## 2023-10-02 PROCEDURE — 3440001: Performed by: FAMILY MEDICINE

## 2023-10-02 PROCEDURE — A9516 IODINE I-123 SOD IODIDE MIC: HCPCS | Performed by: FAMILY MEDICINE

## 2023-10-02 PROCEDURE — 78014 THYROID IMAGING W/BLOOD FLOW: CPT

## 2023-10-02 PROCEDURE — 3430000001 HC RX 343 DIAGNOSTIC RADIOPHARMACEUTICALS: Performed by: FAMILY MEDICINE

## 2023-10-02 RX ORDER — SODIUM IODIDE I 123 200 UCI/1
400 CAPSULE, GELATIN COATED ORAL
Status: COMPLETED | OUTPATIENT
Start: 2023-10-02 | End: 2023-10-02

## 2023-10-02 RX ADMIN — SODIUM IODIDE I 123 400 MICROCURIE: 200 CAPSULE, GELATIN COATED ORAL at 10:30

## 2023-10-03 ENCOUNTER — LAB (OUTPATIENT)
Dept: LAB | Facility: LAB | Age: 68
End: 2023-10-03
Payer: MEDICARE

## 2023-10-03 ENCOUNTER — OFFICE VISIT (OUTPATIENT)
Dept: ENDOCRINOLOGY | Facility: CLINIC | Age: 68
End: 2023-10-03
Payer: MEDICARE

## 2023-10-03 ENCOUNTER — HOSPITAL ENCOUNTER (OUTPATIENT)
Dept: RADIOLOGY | Facility: HOSPITAL | Age: 68
Discharge: HOME | End: 2023-10-03
Payer: MEDICARE

## 2023-10-03 VITALS
WEIGHT: 167 LBS | SYSTOLIC BLOOD PRESSURE: 115 MMHG | DIASTOLIC BLOOD PRESSURE: 64 MMHG | HEIGHT: 61 IN | HEART RATE: 69 BPM | BODY MASS INDEX: 31.53 KG/M2

## 2023-10-03 DIAGNOSIS — E78.2 MIXED HYPERLIPIDEMIA: ICD-10-CM

## 2023-10-03 DIAGNOSIS — E05.90 HYPERTHYROIDISM: ICD-10-CM

## 2023-10-03 DIAGNOSIS — I10 ESSENTIAL HYPERTENSION: ICD-10-CM

## 2023-10-03 DIAGNOSIS — E05.90 HYPERTHYROIDISM: Primary | ICD-10-CM

## 2023-10-03 DIAGNOSIS — R73.9 HYPERGLYCEMIA: ICD-10-CM

## 2023-10-03 LAB
ALBUMIN SERPL BCP-MCNC: 3.8 G/DL (ref 3.4–5)
ALP SERPL-CCNC: 181 U/L (ref 33–136)
ALT SERPL W P-5'-P-CCNC: 28 U/L (ref 7–45)
ANION GAP SERPL CALC-SCNC: 15 MMOL/L (ref 10–20)
AST SERPL W P-5'-P-CCNC: 13 U/L (ref 9–39)
BASOPHILS # BLD AUTO: 0.03 X10*3/UL (ref 0–0.1)
BASOPHILS NFR BLD AUTO: 0.4 %
BILIRUB SERPL-MCNC: 0.4 MG/DL (ref 0–1.2)
BUN SERPL-MCNC: 15 MG/DL (ref 6–23)
CALCIUM SERPL-MCNC: 9.9 MG/DL (ref 8.6–10.6)
CHLORIDE SERPL-SCNC: 105 MMOL/L (ref 98–107)
CO2 SERPL-SCNC: 27 MMOL/L (ref 21–32)
CREAT SERPL-MCNC: 0.47 MG/DL (ref 0.5–1.05)
EOSINOPHIL # BLD AUTO: 0.17 X10*3/UL (ref 0–0.7)
EOSINOPHIL NFR BLD AUTO: 2.4 %
ERYTHROCYTE [DISTWIDTH] IN BLOOD BY AUTOMATED COUNT: 13 % (ref 11.5–14.5)
GFR SERPL CREATININE-BSD FRML MDRD: >90 ML/MIN/1.73M*2
GLUCOSE SERPL-MCNC: 99 MG/DL (ref 74–99)
HCT VFR BLD AUTO: 43.1 % (ref 36–46)
HGB BLD-MCNC: 13.5 G/DL (ref 12–16)
IMM GRANULOCYTES # BLD AUTO: 0.01 X10*3/UL (ref 0–0.7)
IMM GRANULOCYTES NFR BLD AUTO: 0.1 % (ref 0–0.9)
LYMPHOCYTES # BLD AUTO: 3.21 X10*3/UL (ref 1.2–4.8)
LYMPHOCYTES NFR BLD AUTO: 45.5 %
MCH RBC QN AUTO: 25.7 PG (ref 26–34)
MCHC RBC AUTO-ENTMCNC: 31.3 G/DL (ref 32–36)
MCV RBC AUTO: 82 FL (ref 80–100)
MONOCYTES # BLD AUTO: 0.81 X10*3/UL (ref 0.1–1)
MONOCYTES NFR BLD AUTO: 11.5 %
NEUTROPHILS # BLD AUTO: 2.82 X10*3/UL (ref 1.2–7.7)
NEUTROPHILS NFR BLD AUTO: 40.1 %
NRBC BLD-RTO: 0 /100 WBCS (ref 0–0)
PLATELET # BLD AUTO: 234 X10*3/UL (ref 150–450)
PMV BLD AUTO: 9.8 FL (ref 7.5–11.5)
POTASSIUM SERPL-SCNC: 4.3 MMOL/L (ref 3.5–5.3)
PROT SERPL-MCNC: 6.9 G/DL (ref 6.4–8.2)
RBC # BLD AUTO: 5.26 X10*6/UL (ref 4–5.2)
SODIUM SERPL-SCNC: 143 MMOL/L (ref 136–145)
WBC # BLD AUTO: 7.1 X10*3/UL (ref 4.4–11.3)

## 2023-10-03 PROCEDURE — 1036F TOBACCO NON-USER: CPT | Performed by: INTERNAL MEDICINE

## 2023-10-03 PROCEDURE — 99214 OFFICE O/P EST MOD 30 MIN: CPT | Performed by: INTERNAL MEDICINE

## 2023-10-03 PROCEDURE — 1126F AMNT PAIN NOTED NONE PRSNT: CPT | Performed by: INTERNAL MEDICINE

## 2023-10-03 PROCEDURE — 84445 ASSAY OF TSI GLOBULIN: CPT

## 2023-10-03 PROCEDURE — 1159F MED LIST DOCD IN RCRD: CPT | Performed by: INTERNAL MEDICINE

## 2023-10-03 PROCEDURE — 3078F DIAST BP <80 MM HG: CPT | Performed by: INTERNAL MEDICINE

## 2023-10-03 PROCEDURE — 36415 COLL VENOUS BLD VENIPUNCTURE: CPT

## 2023-10-03 PROCEDURE — 1160F RVW MEDS BY RX/DR IN RCRD: CPT | Performed by: INTERNAL MEDICINE

## 2023-10-03 PROCEDURE — 3074F SYST BP LT 130 MM HG: CPT | Performed by: INTERNAL MEDICINE

## 2023-10-03 RX ORDER — METHIMAZOLE 10 MG/1
10 TABLET ORAL 2 TIMES DAILY
Qty: 180 TABLET | Refills: 1 | Status: SHIPPED | OUTPATIENT
Start: 2023-10-03 | End: 2023-12-07 | Stop reason: WASHOUT

## 2023-10-03 ASSESSMENT — PAIN SCALES - GENERAL: PAINLEVEL: 0-NO PAIN

## 2023-10-03 NOTE — PROGRESS NOTES
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     Pertinent Hx:  -Patient experiencing unintentional palpitations starting Summer 2023 prompting extensive work-up (due to elevated alkaline phosphatase level and palpitations) and eventual TFT check in September 2023  -On 9/7/23 labs showed: TSH <0.01, FT4 4.67, FT3 >10, antiTPO 35, Thyroglobulin 350, Thyroglobulin Ab <0.9 with elevated alkaline phosphatase 170 (elevated since Summer 2023)  -Contrast exposure: HIDA scan with 6.0 mCi TC-99m on 9/13/23, none prior to lab check  -Amiodarone exposure: None  -Prior thyroid imaging: None; did have thyroid U/S on 9/30 and uptake and scane on 9/13     Today:  -Patient reports that she began noticing unintentional weight loss and poor appetite beginning in Dec 2022. In total, she has unintentionally lost about 50 lbs. Earlier this summer, she began having palpitations which is eventually what prompted her TFTs to be checked. She notes that she did not have any neck pain, URI, or illness recently  -She was recently seen by cardiology for the palpitations and was started on metoprolol which caused the palpitations to cease. She has had work-up for the elevated alkaline phosphatase level and is actually scheduled for cholecystectomy next week.   -Medications include amlodipine, atorvastatin, arimedex, metoprolol, and spironolactone. She was prescribed methimazole 10mg BID by her PCP a few weeks ago but only took doses for about 2 days as she was told she had to stop it for the uptake and scan. Has not resumed the methimazole.  -OTC medications: previously taking vitamin D and calcium but stopped with this extensive work-up as she was not sure if she should be on them or not. Takes metamucil for constipation.  -Denies any recent contrast exposure, steroid exposure, or amiodarone exposure    ROS:  Neck  pain: Denies  Neck swelling: Denies   HA: Denies  Heat/ cold intolerance: Endorses heat intolerance  Hot flashes: Endorses hot flashes that come and go  Night sweats: Endorses some night sweats  Energy: So-so  Sleep: Okay, stays up until 2-3AM and then sleeps until about 10-10:30AM  Appetite: Poor, has decreased  Change in weight: 50 lbs weight loss since Dec 2022, unintentional  Nausea/vomiting: Occasional nausea  Abdominal pain: Denies  Constipation/diarrhea: Some constipation, uses metamucil    Vision changes: Mild photophobia. Denies any gritty sensation, tearing, or redness. No crusting in the morning. No issues with her vision.   Hoarseness: Denies  Dysphagia: Denies  CP: Denies  SOB: Denies  Palpitations: Resolved now that she is on metoprolol   Tremor: Denies  Muscle weakness: Some difficulty getting up from the floor and going up stairs  Muscle cramps: Denies  Hair: Notes early gray spot in her hair at age 10    10 point ROS neg unless stated above    PMH: As above  PSHx: Pt reports that she had a FNA done on her neck ~20 years ago at a hospital in [place]. Does not recall the diagnosis but she reports she had surgery to remove the mass. She has scar resembling that of a thyroidectomy. Also had partial R mastectomy.   Allergies: codeine  Family Hx: Denies family hx of thyroid problems or autoimmune problem. Reports her grandfather began having gray hair at age 15-15 yo  Social Hx:  -Alcohol: Rarely  -Tobacco: Denies  -Illicits: Denies  -Retired, former principal    Current Outpatient Medications on File Prior to Visit   Medication Sig Dispense Refill    ALPRAZolam (Xanax) 0.25 mg tablet Take 1 tablet (0.25 mg) by mouth 3 times a day as needed for anxiety. 21 tablet 0    amLODIPine (Norvasc) 10 mg tablet TAKE 1 TABLET BY MOUTH EVERY DAY (Patient not taking: Reported on 10/3/2023) 90 tablet 3    amLODIPine (Norvasc) 5 mg tablet Take 1 tablet (5 mg) by mouth once daily.      anastrozole (Arimidex) 1 mg tablet  "Take 1 tablet (1 mg total) by mouth once daily.      atorvastatin (Lipitor) 40 mg tablet TAKE 1 TABLET BY MOUTH EVERYDAY AT BEDTIME 90 tablet 0    cholecalciferol (Vitamin D-3) 50 mcg (2,000 unit) capsule Take 1 capsule (50 mcg) by mouth once daily.      magnesium oxide (Mag-Ox) 400 mg (241.3 mg magnesium) tablet Take 1 tablet (400 mg) by mouth 2 times a day.      metoprolol succinate XL (Toprol-XL) 50 mg 24 hr tablet Take 1 tablet (50 mg) by mouth once daily.      spironolactone (Aldactone) 25 mg tablet Take 1 tablet (25 mg) by mouth once daily.      [DISCONTINUED] methIMAzole (Tapazole) 10 mg tablet Take 1 tablet (10 mg) by mouth 2 times a day. 60 tablet 1     Current Facility-Administered Medications on File Prior to Visit   Medication Dose Route Frequency Provider Last Rate Last Admin    sodium iodide-123 (I-123) capsule 400 microcurie  400 microcurie oral Once in imaging Mona Emanuel MD          O:  VS:  /64   Pulse 69   Ht 1.549 m (5' 1\")   Wt 75.8 kg (167 lb)   LMP 01/01/1990 (Approximate)   BMI 31.55 kg/m²      PE:  GEN: NAD, AAOX3  HEENT : No noted exophthalmos, lid retraction, or lid lag. No chemosis or tearing.   NECK: horizontal scar noted over anterior neck, less palpable thyroid tissue on the R, thick isthmus and nodular thyroid noted on the L  Pulm: CTAB, no increased WOB  CV:S1.S2,RRR  Neuro : no FND, mildly brisk DTR, mild tremor on outstretched hands  LE: no peripheral edema   Skin: warm and dry, velvety texture   Psych: Appropriate mood and behavior    LABS:              IMAGING:  THYROID UPTAKE AND SCAN (10/2/23)  FINDINGS:  The uptake of radioiodine in the anterior neck at about 24 hours is  74 percent (normal 10 to 30 percent).      Analysis of the images reveals heterogeneous uptake throughout the  thyroid gland with asymmetric uptake in the left thyroid gland with a  hot nodule in the left lower pole. Note is made of suppression of the  uptake in the remainder of the " thyroid gland.      IMPRESSION:  1. Heterogeneous uptake throughout the thyroid gland with increased  radioiodine uptake at 24 hours. There is asymmetric uptake in the  left thyroid gland with a hot nodule in the left lower pole  corresponding to the nodule visualized on the prior ultrasound. There  is relative suppression of uptake on the remainder of the thyroid  gland.    THYROID U/S (9/30/23)  FINDINGS:  RIGHT THYROID LOBE:  Right thyroid lobe:  2 x 1 x 1.1 cm.  Homogeneous, but with nodule as below.  Vascularity:  Increased  NODULE #: 1  Location: Midpole laterally  Size: 8 x 6 x 8 mm  Composition: Solid  Echogenicity: Isoechoic  Shape:  Wider than tall  Margin:  Smooth  Echogenic Foci: None  The total score of this nodule is 3 points, corresponding to a  TI-RADS category 3. Follow-up is not required given the size.                  LEFT THYROID LOBE:  Left thyroid lobe:  6.1 x 2.8 x 3.3 cm.  Moderate heterogeneity, with 3 solid hypoechoic nodules as described  below Vascularity:  Increased  NODULE #: 2, 3 and 4  Location: 2 at the upper pole, 1 at the midpole.  Size: 1.1 x 1 x 1.2 cm, 1.2 x 1.1 x 1.3 cm, 0.7 x 0.8 x 0.8 cm.  Composition: Solid  Echogenicity: Hyperechoic  Shape:  Wider than tall  Margin:  Smooth  Echogenic Foci: None  The total score of these nodules are 3 points, corresponding to a  TI-RADS category 3. Follow-up is not required given the size.              ISTHMUS:  Size:  0.7 cm in AP dimension.      NODULES: (Please note, assessment and description of nodules is per  TI-RADS criteria. Up to 4 total nodules described, which includes  largest and/or most clinically significant based on morphology.) It  is noted that some spongiform and/or cystic nodules may not be  specifically described and are TR category 1 (benign).      CERVICAL LYMPHADENOPATHY:  None.      IMPRESSION:  Multinodular goiter as described. It is noted at the right thyroid  lobe is significantly smaller than the  left.        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     Ddx includes likely Graves disease vs hot nodule vs thyroiditis. Graves disease is most likely given the background autoimmunity (early graying hair) and diffuse increased uptake noted on thyroid uptake and scan; will confirm by checking TSI today. Hot nodule is less likely; although thyroid uptake and scan notes heterogeneous uptake with potential concern for hot nodule, patient had what appears to be a R thyroidectomy in the past which is potentially why the uptake appears suppressed throughout the remainder of the thyroid. Thyroiditis is also less likely given lack of neck pain or preceding illness and long timeline of symptoms.     #Hyperthyroidism, likely Graves disease  ::Patient is clinically and biochemically hyperthyroid  -CHECK FT4, T3, TSI, CMP, CBC today (TSH likely to be quite suppressed, no need to check)  -c/w metoprolol succinate ER 50mg daily   -START methimazole 20mg BID for 3 weeks and then decrease to 10mg BID thereafter; new  prescription sent to pharmacy  -Repeat TSH, FT4 in 3 weeks and again in 6 weeks   -Discussed potential adverse effects of methimazole including agranulocytosis, liver disease, and allergic reaction  -Strongly advised against gallbladder surgery until she is euthyroid (originally scheduled for next week); will tag surgeon to the note    RTC in 2 mo  Will be in touch with patient regarding lab results and any potential medication adjustments    Patient seen, discussed, and examined with Dr. Nickerson who agrees with the management plan.

## 2023-10-04 ENCOUNTER — APPOINTMENT (OUTPATIENT)
Dept: RADIOLOGY | Facility: HOSPITAL | Age: 68
End: 2023-10-04
Payer: MEDICARE

## 2023-10-04 ENCOUNTER — TELEPHONE (OUTPATIENT)
Dept: SURGERY | Facility: CLINIC | Age: 68
End: 2023-10-04
Payer: MEDICARE

## 2023-10-04 ENCOUNTER — TELEPHONE (OUTPATIENT)
Dept: ENDOCRINOLOGY | Facility: HOSPITAL | Age: 68
End: 2023-10-04
Payer: MEDICARE

## 2023-10-04 LAB
T3 SERPL-MCNC: 364 NG/DL (ref 60–200)
T3FREE SERPL-MCNC: >10 PG/ML (ref 2.3–4.2)
T4 FREE SERPL-MCNC: 3 NG/DL (ref 0.78–1.48)

## 2023-10-04 NOTE — TELEPHONE ENCOUNTER
Patient called requesting clarification regarding the medication regimen we discussed at the appointment yesterday. No answer. Left message with the instructions as we discussed at the appointment and encouraged her to call back if any clarification needed.    Patient called back and I spoke with her. Clarified the regimen as above, she is to take methimazole 20mg BID for 3 weeks.

## 2023-10-04 NOTE — TELEPHONE ENCOUNTER
I called and spoke with patient; with her hyperthyroidism, surgery will be cancelled until euthyroid; she will be on low fat

## 2023-10-05 ENCOUNTER — DOCUMENTATION (OUTPATIENT)
Dept: ENDOCRINOLOGY | Facility: HOSPITAL | Age: 68
End: 2023-10-05

## 2023-10-05 ENCOUNTER — ANCILLARY PROCEDURE (OUTPATIENT)
Dept: CARDIOLOGY | Facility: CLINIC | Age: 68
End: 2023-10-05
Payer: MEDICARE

## 2023-10-05 ENCOUNTER — APPOINTMENT (OUTPATIENT)
Dept: RADIOLOGY | Facility: HOSPITAL | Age: 68
End: 2023-10-05
Payer: MEDICARE

## 2023-10-05 DIAGNOSIS — R00.2 PALPITATIONS: ICD-10-CM

## 2023-10-05 DIAGNOSIS — R42 DIZZINESS: ICD-10-CM

## 2023-10-05 DIAGNOSIS — R01.1 MURMUR, HEART: ICD-10-CM

## 2023-10-05 DIAGNOSIS — R53.83 FATIGUE, UNSPECIFIED TYPE: ICD-10-CM

## 2023-10-05 LAB — TSH SERPL-ACNC: <0.01 MIU/L (ref 0.44–3.98)

## 2023-10-05 PROCEDURE — 93224 XTRNL ECG REC UP TO 48 HRS: CPT | Performed by: INTERNAL MEDICINE

## 2023-10-09 ENCOUNTER — LAB (OUTPATIENT)
Dept: LAB | Facility: LAB | Age: 68
End: 2023-10-09
Payer: MEDICARE

## 2023-10-09 ENCOUNTER — TELEPHONE (OUTPATIENT)
Dept: ENDOCRINOLOGY | Facility: HOSPITAL | Age: 68
End: 2023-10-09
Payer: MEDICARE

## 2023-10-09 DIAGNOSIS — E05.90 HYPERTHYROIDISM: ICD-10-CM

## 2023-10-09 DIAGNOSIS — E05.90 HYPERTHYROIDISM: Primary | ICD-10-CM

## 2023-10-09 LAB
ALBUMIN SERPL BCP-MCNC: 4 G/DL (ref 3.4–5)
ALP SERPL-CCNC: 179 U/L (ref 33–136)
ALT SERPL W P-5'-P-CCNC: 21 U/L (ref 7–45)
AST SERPL W P-5'-P-CCNC: 13 U/L (ref 9–39)
BILIRUB DIRECT SERPL-MCNC: 0.1 MG/DL (ref 0–0.3)
BILIRUB SERPL-MCNC: 0.5 MG/DL (ref 0–1.2)
ERYTHROCYTE [DISTWIDTH] IN BLOOD BY AUTOMATED COUNT: 13.1 % (ref 11.5–14.5)
HCT VFR BLD AUTO: 40 % (ref 36–46)
HGB BLD-MCNC: 12.9 G/DL (ref 12–16)
MCH RBC QN AUTO: 25.3 PG (ref 26–34)
MCHC RBC AUTO-ENTMCNC: 32.3 G/DL (ref 32–36)
MCV RBC AUTO: 79 FL (ref 80–100)
NRBC BLD-RTO: 0 /100 WBCS (ref 0–0)
PLATELET # BLD AUTO: 219 X10*3/UL (ref 150–450)
PMV BLD AUTO: 9.8 FL (ref 7.5–11.5)
PROT SERPL-MCNC: 6.6 G/DL (ref 6.4–8.2)
RBC # BLD AUTO: 5.09 X10*6/UL (ref 4–5.2)
WBC # BLD AUTO: 5.5 X10*3/UL (ref 4.4–11.3)

## 2023-10-09 PROCEDURE — 85027 COMPLETE CBC AUTOMATED: CPT

## 2023-10-09 PROCEDURE — 80076 HEPATIC FUNCTION PANEL: CPT

## 2023-10-09 PROCEDURE — 36415 COLL VENOUS BLD VENIPUNCTURE: CPT

## 2023-10-09 NOTE — TELEPHONE ENCOUNTER
Pt called in stating that she had 1 hour episode of dizziness, spinning, HA, and photophobia on Friday evening (10/6). BP 160s/80s at the time. She reports it resolved spontaneously. Never had an episode like this in the past. SBPs have been 140s since. She decreased methimazole from 20mg BID to 20mg QAM + 10mg QPM. She has felt in her normal state of health since.    Appears she may have had a migraine or vertigo. Encouraged her to reach out to PCP. Although unlikely to be adverse effect of methimazole, will recheck CBC and LFTs to be certain (baseline WBC and ANC as well as LFTs were stable on 10/3 prior to starting methimazole). Given her significant hyperthyroidism (TSH <0.01, ft4 3.0, ft3 >10, t3 364), did encourage her to increase methimazole back to 20mg BID. Will let patient know if results of CBC and LFTs.     Plan discussed with Dr. Nickerson.

## 2023-10-10 ENCOUNTER — TELEPHONE (OUTPATIENT)
Dept: ENDOCRINOLOGY | Facility: HOSPITAL | Age: 68
End: 2023-10-10
Payer: MEDICARE

## 2023-10-10 ENCOUNTER — TELEPHONE (OUTPATIENT)
Dept: PRIMARY CARE | Facility: CLINIC | Age: 68
End: 2023-10-10
Payer: MEDICARE

## 2023-10-10 DIAGNOSIS — E05.90 HYPERTHYROIDISM: Primary | ICD-10-CM

## 2023-10-10 NOTE — TELEPHONE ENCOUNTER
Reviewed labs. CBC and LFTs stable, sx unlikely to be related to methimazole.  Spoke with patient to let her know. She will get follow-up TFTs done in 2 weeks prior to dose reducing methimazole as discussed at appointment.

## 2023-10-11 DIAGNOSIS — E05.90 HYPERTHYROIDISM: Primary | ICD-10-CM

## 2023-10-13 LAB — TSI SER-ACNC: 5.9 TSI INDEX

## 2023-10-16 NOTE — PROGRESS NOTES
Emailed Darlene Vallecillo with holter showing 4 runs of wide complex beat ( 1 run is 11 beats long).

## 2023-10-17 ENCOUNTER — TELEMEDICINE (OUTPATIENT)
Dept: PRIMARY CARE | Facility: CLINIC | Age: 68
End: 2023-10-17
Payer: MEDICARE

## 2023-10-17 VITALS
DIASTOLIC BLOOD PRESSURE: 73 MMHG | WEIGHT: 162 LBS | SYSTOLIC BLOOD PRESSURE: 133 MMHG | HEIGHT: 61 IN | BODY MASS INDEX: 30.58 KG/M2 | HEART RATE: 61 BPM

## 2023-10-17 DIAGNOSIS — R73.9 HYPERGLYCEMIA: ICD-10-CM

## 2023-10-17 DIAGNOSIS — E05.90 HYPERTHYROIDISM: ICD-10-CM

## 2023-10-17 DIAGNOSIS — E78.2 MIXED HYPERLIPIDEMIA: ICD-10-CM

## 2023-10-17 DIAGNOSIS — R00.0 TACHYCARDIA: Primary | ICD-10-CM

## 2023-10-17 DIAGNOSIS — E05.00 GRAVES DISEASE: ICD-10-CM

## 2023-10-17 DIAGNOSIS — K80.20 GALLSTONES: ICD-10-CM

## 2023-10-17 PROCEDURE — 99213 OFFICE O/P EST LOW 20 MIN: CPT | Performed by: FAMILY MEDICINE

## 2023-10-17 ASSESSMENT — ANXIETY QUESTIONNAIRES
6. BECOMING EASILY ANNOYED OR IRRITABLE: NOT AT ALL
GAD7 TOTAL SCORE: 0
5. BEING SO RESTLESS THAT IT IS HARD TO SIT STILL: NOT AT ALL
IF YOU CHECKED OFF ANY PROBLEMS ON THIS QUESTIONNAIRE, HOW DIFFICULT HAVE THESE PROBLEMS MADE IT FOR YOU TO DO YOUR WORK, TAKE CARE OF THINGS AT HOME, OR GET ALONG WITH OTHER PEOPLE: NOT DIFFICULT AT ALL
4. TROUBLE RELAXING: NOT AT ALL
3. WORRYING TOO MUCH ABOUT DIFFERENT THINGS: NOT AT ALL
2. NOT BEING ABLE TO STOP OR CONTROL WORRYING: NOT AT ALL
7. FEELING AFRAID AS IF SOMETHING AWFUL MIGHT HAPPEN: NOT AT ALL
1. FEELING NERVOUS, ANXIOUS, OR ON EDGE: NOT AT ALL

## 2023-10-17 ASSESSMENT — PATIENT HEALTH QUESTIONNAIRE - PHQ9
SUM OF ALL RESPONSES TO PHQ9 QUESTIONS 1 AND 2: 0
1. LITTLE INTEREST OR PLEASURE IN DOING THINGS: NOT AT ALL
2. FEELING DOWN, DEPRESSED OR HOPELESS: NOT AT ALL

## 2023-10-17 NOTE — PROGRESS NOTES
"Subjective   Patient ID: Marisol Weiner is a 68 y.o. female who presents and consented for VIRTUAL VISIT ---     VIDEO    C19: x3  Flu: Decline  Pneum: 12/2019  PAP: hysterectomy 1990  Mamm: 07/23  ColoRect: 01/23  PHQ 2: UTD  Had incident of dizziness on higher dose tapazole as rxd by ENDO  Changed dose  Doing well since  No hi/si  Mood is stable  no palpitations no cp or sob  Had nausea-dizziness-and \"head spinning\" it was 2nd day on high dose tapazole had taken 40mg  Now on 40mg doing WELL  Bp was high but also was anxious  Unclear what caused episode    HPI  Patient Active Problem List   Diagnosis    Anxiety    Ductal carcinoma in situ (DCIS) of right breast    Essential hypertension    Hyperglycemia    Mixed hyperlipidemia    Class 1 obesity with body mass index (BMI) of 31.0 to 31.9 in adult    Hyperthyroidism    Gallstones    Weight loss    Tachycardia    Palpitations    Other microscopic hematuria    Numbness in left leg    Nausea in adult    Hip pain, left    Heart murmur, systolic    Fibroadenoma of right breast    Fatigue    Early satiety    Dizziness    Atypical mole    Anosmia       Past Surgical History:   Procedure Laterality Date    BREAST BIOPSY Left 09/2020    fibroadenoma    BREAST BIOPSY Right 06/2021    ductal carcinoma in situ    COLONOSCOPY  01/2023    no polyps-due 2033    HYSTERECTOMY  1990       Review of Systems  This patient has   NO history of recent Covid nor flu symptoms,      NO Fever nor chills,  NO Chest pain, shortness of breath nor paroxysmal nocturnal dyspnea,  NO Nausea, vomiting, nor diarrhea,  NO Hematochezia nor melena,  NO Dysuria, hematuria, nor new incontinence issues  NO new severe headaches nor neurological complaints,  NO new issues with anxiety nor depression nor new psychiatric complaints,  NO suicidal nor homicidal ideations.     OBJECTIVE:  /73 (BP Location: Left arm, Patient Position: Sitting, BP Cuff Size: Adult)   Pulse 61   Ht 1.549 m (5' 1\")   Wt " 73.5 kg (162 lb)   LMP 01/01/1990 (Approximate)   BMI 30.61 kg/m²      General:  alert, oriented, no acute distress.  Mood is pleasant, not tearful, no signs of emotional distress.  Not appearing intoxicated or altered.   No voiced delusions,   Normal, appropriate behavior.    HEENT: Normocephalic, atraumatic,   Pupils round, reactive to light  Extraocular motions intact and wnl    Conversing sans difficulty does NOT appear to be short of breath-in severe pain or toxic appearing    Appears to be in baseline health      Lab on 10/09/2023   Component Date Value Ref Range Status    WBC 10/09/2023 5.5  4.4 - 11.3 x10*3/uL Final    nRBC 10/09/2023 0.0  0.0 - 0.0 /100 WBCs Final    RBC 10/09/2023 5.09  4.00 - 5.20 x10*6/uL Final    Hemoglobin 10/09/2023 12.9  12.0 - 16.0 g/dL Final    Hematocrit 10/09/2023 40.0  36.0 - 46.0 % Final    MCV 10/09/2023 79 (L)  80 - 100 fL Final    MCH 10/09/2023 25.3 (L)  26.0 - 34.0 pg Final    MCHC 10/09/2023 32.3  32.0 - 36.0 g/dL Final    RDW 10/09/2023 13.1  11.5 - 14.5 % Final    Platelets 10/09/2023 219  150 - 450 x10*3/uL Final    MPV 10/09/2023 9.8  7.5 - 11.5 fL Final    Albumin 10/09/2023 4.0  3.4 - 5.0 g/dL Final    Bilirubin, Total 10/09/2023 0.5  0.0 - 1.2 mg/dL Final    Bilirubin, Direct 10/09/2023 0.1  0.0 - 0.3 mg/dL Final    Alkaline Phosphatase 10/09/2023 179 (H)  33 - 136 U/L Final    ALT 10/09/2023 21  7 - 45 U/L Final    Patients treated with Sulfasalazine may generate falsely decreased results for ALT.    AST 10/09/2023 13  9 - 39 U/L Final    Total Protein 10/09/2023 6.6  6.4 - 8.2 g/dL Final   Lab on 10/03/2023   Component Date Value Ref Range Status    Thyroid Stimulating Hormone 10/03/2023 <0.01 (L)  0.44 - 3.98 mIU/L Final    Thyroxine, Free 10/03/2023 3.00 (H)  0.78 - 1.48 ng/dL Final    Triiodothyronine, Free 10/03/2023 >10.0 (H)  2.3 - 4.2 pg/mL Final    result    WBC 10/03/2023 7.1  4.4 - 11.3 x10*3/uL Final    nRBC 10/03/2023 0.0  0.0 - 0.0 /100 WBCs Final     RBC 10/03/2023 5.26 (H)  4.00 - 5.20 x10*6/uL Final    Hemoglobin 10/03/2023 13.5  12.0 - 16.0 g/dL Final    Hematocrit 10/03/2023 43.1  36.0 - 46.0 % Final    MCV 10/03/2023 82  80 - 100 fL Final    MCH 10/03/2023 25.7 (L)  26.0 - 34.0 pg Final    MCHC 10/03/2023 31.3 (L)  32.0 - 36.0 g/dL Final    RDW 10/03/2023 13.0  11.5 - 14.5 % Final    Platelets 10/03/2023 234  150 - 450 x10*3/uL Final    MPV 10/03/2023 9.8  7.5 - 11.5 fL Final    Neutrophils % 10/03/2023 40.1  40.0 - 80.0 % Final    Immature Granulocytes %, Automated 10/03/2023 0.1  0.0 - 0.9 % Final    Immature Granulocyte Count (IG) includes promyelocytes, myelocytes and metamyelocytes but does not include bands. Percent differential counts (%) should be interpreted in the context of the absolute cell counts (cells/UL).    Lymphocytes % 10/03/2023 45.5  13.0 - 44.0 % Final    Monocytes % 10/03/2023 11.5  2.0 - 10.0 % Final    Eosinophils % 10/03/2023 2.4  0.0 - 6.0 % Final    Basophils % 10/03/2023 0.4  0.0 - 2.0 % Final    Neutrophils Absolute 10/03/2023 2.82  1.20 - 7.70 x10*3/uL Final    Percent differential counts (%) should be interpreted in the context of the absolute cell counts (cells/uL).    Immature Granulocytes Absolute, Au* 10/03/2023 0.01  0.00 - 0.70 x10*3/uL Final    Lymphocytes Absolute 10/03/2023 3.21  1.20 - 4.80 x10*3/uL Final    Monocytes Absolute 10/03/2023 0.81  0.10 - 1.00 x10*3/uL Final    Eosinophils Absolute 10/03/2023 0.17  0.00 - 0.70 x10*3/uL Final    Basophils Absolute 10/03/2023 0.03  0.00 - 0.10 x10*3/uL Final    Triiodothyronine 10/03/2023 364 (H)  60 - 200 ng/dL Final    result    TSI 10/03/2023 5.9 (H)  <=1.3 TSI index Final       Test Performed by:  [facility]  [address] [id number]  : Dany Clark M.D. Ph.D.; CLIA# [id number]    Glucose 10/03/2023 99  74 - 99 mg/dL Final    Sodium 10/03/2023 143  136 - 145 mmol/L Final    Potassium  10/03/2023 4.3  3.5 - 5.3 mmol/L Final    Chloride 10/03/2023 105  98 - 107 mmol/L Final    Bicarbonate 10/03/2023 27  21 - 32 mmol/L Final    Anion Gap 10/03/2023 15  10 - 20 mmol/L Final    Urea Nitrogen 10/03/2023 15  6 - 23 mg/dL Final    Creatinine 10/03/2023 0.47 (L)  0.50 - 1.05 mg/dL Final    eGFR 10/03/2023 >90  >60 mL/min/1.73m*2 Final    Calculations of estimated GFR are performed using the 2021 CKD-EPI Study Refit  equation without the race variable for the IDMS-Traceable Creatinine Methods.    https://jasn.asnjournals.org/content/early/2021/09/22/ASN.1898495689    Calcium 10/03/2023 9.9  8.6 - 10.6 mg/dL Final    Albumin 10/03/2023 3.8  3.4 - 5.0 g/dL Final    Alkaline Phosphatase 10/03/2023 181 (H)  33 - 136 U/L Final    Total Protein 10/03/2023 6.9  6.4 - 8.2 g/dL Final    AST 10/03/2023 13  9 - 39 U/L Final    Bilirubin, Total 10/03/2023 0.4  0.0 - 1.2 mg/dL Final    ALT 10/03/2023 28  7 - 45 U/L Final    Patients treated with Sulfasalazine may generate falsely decreased results for ALT.   Lab on 09/13/2023   Component Date Value Ref Range Status    T3, Free 09/13/2023 >10.0 (H)  2.3 - 4.2 pg/mL Final    Glucose 09/13/2023 97  74 - 99 mg/dL Final    Sodium 09/13/2023 142  136 - 145 mmol/L Final    Potassium 09/13/2023 3.9  3.5 - 5.3 mmol/L Final    Chloride 09/13/2023 104  98 - 107 mmol/L Final    Bicarbonate 09/13/2023 31  21 - 32 mmol/L Final    Anion Gap 09/13/2023 11  10 - 20 mmol/L Final    Urea Nitrogen 09/13/2023 14  6 - 23 mg/dL Final    Creatinine 09/13/2023 0.41 (L)  0.50 - 1.05 mg/dL Final    GFR Female 09/13/2023 >90  >90 mL/min/1.73m2 Final     CALCULATIONS OF ESTIMATED GFR ARE PERFORMED   USING THE 2021 CKD-EPI STUDY REFIT EQUATION   WITHOUT THE RACE VARIABLE FOR THE IDMS-TRACEABLE   CREATININE METHODS.    https://jasn.asnjournals.org/content/early/2021/09/22/ASN.7253433631    Calcium 09/13/2023 9.1  8.6 - 10.3 mg/dL Final    Albumin 09/13/2023 3.8  3.4 - 5.0 g/dL Final    Alkaline  Phosphatase 09/13/2023 170 (H)  33 - 136 U/L Final    Total Protein 09/13/2023 6.3 (L)  6.4 - 8.2 g/dL Final    AST 09/13/2023 17  9 - 39 U/L Final    Total Bilirubin 09/13/2023 0.5  0.0 - 1.2 mg/dL Final    ALT (SGPT) 09/13/2023 31  7 - 45 U/L Final     Patients treated with Sulfasalazine may generate    falsely decreased results for ALT.    Thyroglobulin 09/13/2023 350.4 (H)  1.3 - 31.8 ng/mL Final    INTERPRETIVE INFORMATION: Thyroglobulin, Serum or Plasma  Specimens negative for thyroglobulin antibodies (TgAb) are tested   for thyroglobulin (Tg) by chemiluminescent immunoassay (CELESTINO) using   the Slated Access DxI method. Specimens with TgAb results   above the upper reference limit are tested for Tg by   high-performance liquid chromatography-tandem mass spectrometry   (LC-MS/MS). Results obtained with different test methods or kits   cannot be used interchangeably. Tg results, regardless of   concentration, should not be interpreted as absolute evidence for   the presence or absence of papillary or follicular thyroid cancer.   Tg testing is not recommended for use as a screening procedure to   detect the presence of thyroid cancer in the general population.    Thyroglobulin LC-MS/MS 09/13/2023 Not Applicable  1.3 - 31.8 ng/mL Final    INTERPRETIVE INFORMATION: Thyroglobulin by LC-MS/MS, Serum/Plasma  Lower limit of detection for Thyroglobulin by LC-MS/MS is 0.5   ng/mL.  This test was developed and its performance characteristics   determined by [email or web address]. It has not been cleared or   approved by the US Food and Drug Administration. This test was   performed in a CLIA certified laboratory and is intended for   clinical purposes.  Performed By: [email or web address]  [address] [place] [address]  : Hema Mccray MD, PhD  CLIA Number: [id number]    Anti-Thyroglobulin AB 09/13/2023 <0.9  0.0 - 4.0 IU/mL Final    INTERPRETIVE INFORMATION: Thyroglobulin Antibody                                     A value of 4.0 IU/mL or less indicates a negative result for   thyroglobulin antibodies.  The Thyroglobulin Antibody assay is being performed using the   [employer] Access DxI method.    Antithyroid Peroxidase Ab 09/13/2023 35  IU/mL Final    Negative: <=60 U/mL  Positive:  >60 U/mL   Orders Only on 09/07/2023   Component Date Value Ref Range Status    Glucose 09/07/2023 92  74 - 99 mg/dL Final    Sodium 09/07/2023 142  136 - 145 mmol/L Final    Potassium 09/07/2023 3.7  3.5 - 5.3 mmol/L Final    Chloride 09/07/2023 105  98 - 107 mmol/L Final    Bicarbonate 09/07/2023 29  21 - 32 mmol/L Final    Anion Gap 09/07/2023 12  10 - 20 mmol/L Final    Urea Nitrogen 09/07/2023 11  6 - 23 mg/dL Final    Creatinine 09/07/2023 0.38 (L)  0.50 - 1.05 mg/dL Final    GFR Female 09/07/2023 >90  >90 mL/min/1.73m2 Final    Comment:  CALCULATIONS OF ESTIMATED GFR ARE PERFORMED   USING THE 2021 CKD-EPI STUDY REFIT EQUATION   WITHOUT THE RACE VARIABLE FOR THE IDMS-TRACEABLE   CREATININE METHODS.    https://jasn.asnjournals.org/content/early/2021/09/22/ASN.3100400336      Calcium 09/07/2023 9.8  8.6 - 10.3 mg/dL Final    TSH 09/07/2023 <0.01 (L)  0.44 - 3.98 mIU/L Final    Comment:  TSH testing is performed using different testing    methodology at [facility] than at other    system hospitals. Direct result comparisons should    only be made within the same method.      Free T4 09/07/2023 4.67 (H)  0.61 - 1.12 ng/dL Final    Comment:  Thyroxine Free testing is performed using different testing    methodology at [facility] than at other    system hospitals. Direct result comparisons should    only be made within the same method.  .   Biotin can cause falsely elevated free T4 results. Patients   taking a Biotin dose of up to 10 mg/day should refrain from   taking Biotin for 24 hours before sample collection. Patient   taking a Biotin dose of >10 mg/day should consult with  their   physician or the laboratory before the blood draw.     Lab on 08/15/2023   Component Date Value Ref Range Status    Vit D, 1,25-Dihydroxy 08/15/2023 50.2  19.9 - 79.3 pg/mL Final    INTERPRETIVE INFORMATION: Vitamin D, 1,25-Dihydroxy  This test is primarily indicated during patient evaluation for   hypercalcemia and renal failure. A normal result does not rule out   Vitamin D deficiency. The recommended test for diagnosing Vitamin   D deficiency is Vitamin D 25-hydroxy.  Performed By: [facility]  [address] [place] [address]  : Hema Mccray MD, PhD  CLIA Number: [id number]   Lab on 08/08/2023   Component Date Value Ref Range Status    Glucose 08/08/2023 89  74 - 99 mg/dL Final    Sodium 08/08/2023 142  136 - 145 mmol/L Final    Potassium 08/08/2023 3.7  3.5 - 5.3 mmol/L Final    Chloride 08/08/2023 105  98 - 107 mmol/L Final    Bicarbonate 08/08/2023 27  21 - 32 mmol/L Final    Anion Gap 08/08/2023 14  10 - 20 mmol/L Final    Urea Nitrogen 08/08/2023 14  6 - 23 mg/dL Final    Creatinine 08/08/2023 0.39 (L)  0.50 - 1.05 mg/dL Final    GFR Female 08/08/2023 >90  >90 mL/min/1.73m2 Final     CALCULATIONS OF ESTIMATED GFR ARE PERFORMED   USING THE 2021 CKD-EPI STUDY REFIT EQUATION   WITHOUT THE RACE VARIABLE FOR THE IDMS-TRACEABLE   CREATININE METHODS.    https://jasn.asnjournals.org/content/early/2021/09/22/ASN.9457348319    Calcium 08/08/2023 10.3  8.6 - 10.3 mg/dL Final    Albumin 08/08/2023 3.9  3.4 - 5.0 g/dL Final    Alkaline Phosphatase 08/08/2023 171 (H)  33 - 136 U/L Final    Total Protein 08/08/2023 7.3  6.4 - 8.2 g/dL Final    AST 08/08/2023 19  9 - 39 U/L Final    Total Bilirubin 08/08/2023 0.5  0.0 - 1.2 mg/dL Final    ALT (SGPT) 08/08/2023 25  7 - 45 U/L Final     Patients treated with Sulfasalazine may generate    falsely decreased results for ALT.    Liver Fraction: 08/08/2023 74  0 - 94 U/L Final    INTERPRETIVE INFORMATION: Alk-Phosphatase Liver  Calc  Bone Specific Alkaline Phosphatase (2440069) and 5'-nucleotidase   (0939026) may be useful in identifying disorders of bone and   liver, respectively.    Bone Fraction: 08/08/2023 117 (H)  0 - 55 U/L Final    Alkaline Phosphatase, Other 08/08/2023 0  U/L Final    Performed By: [facility]  [address] [place] [id number]  : Hema Mccray MD, PhD  CLIA Number: [id number]    Alkaline Phosphatase 08/08/2023 191 (H)  40 - 120 U/L Final    Ferritin 08/08/2023 852 (H)  8 - 150 ug/L Final    Vitamin B-12 08/08/2023 610  211 - 911 pg/mL Final    EBV VCA IgG Antibody 08/08/2023 POSITIVE (A)  NEGATIVE Final    LARY-PATRICIA VIRUS EARLY ANTIGEN A* 08/08/2023 NEGATIVE  NEGATIVE Final    EBV Nuclear Ag Ab 08/08/2023 POSITIVE (A)  NEGATIVE Final    EBV VCA IgM Antibody 08/08/2023 NEGATIVE  NEGATIVE Final    EBV Interpretation 08/08/2023 SEE BELOW   Final    .                       EBV INTERPRETATION CHART  .                 VCA-IGG  VCA-IGM  NA-IGG  EA-IGG  .                 --------------------------------  PRIMARY ACUTE       +/-      +/-      -      +/-  LATE ACUTE           +       +/-     +/-     +/-  RECOVERING           +        -       -       +  PREVIOUS INFECTION   +        -      +/-      -   Lab on 08/03/2023   Component Date Value Ref Range Status    Glucose 08/03/2023 77  74 - 99 mg/dL Final    Sodium 08/03/2023 143  136 - 145 mmol/L Final    Potassium 08/03/2023 4.3  3.5 - 5.3 mmol/L Final    Chloride 08/03/2023 105  98 - 107 mmol/L Final    Bicarbonate 08/03/2023 28  21 - 32 mmol/L Final    Anion Gap 08/03/2023 14  10 - 20 mmol/L Final    Urea Nitrogen 08/03/2023 16  6 - 23 mg/dL Final    Creatinine 08/03/2023 0.33 (L)  0.50 - 1.05 mg/dL Final    GFR Female 08/03/2023 >90  >90 mL/min/1.73m2 Final     CALCULATIONS OF ESTIMATED GFR ARE PERFORMED   USING THE 2021 CKD-EPI STUDY REFIT EQUATION   WITHOUT THE RACE VARIABLE FOR THE IDMS-TRACEABLE   CREATININE  METHODS.    https://jasn.asnjournals.org/content/early/2021/09/22/ASN.1380322924    Calcium 08/03/2023 10.2  8.6 - 10.6 mg/dL Final    Albumin 08/03/2023 3.9  3.4 - 5.0 g/dL Final    Alkaline Phosphatase 08/03/2023 174 (H)  33 - 136 U/L Final    Total Protein 08/03/2023 7.0  6.4 - 8.2 g/dL Final    AST 08/03/2023 15  9 - 39 U/L Final    Total Bilirubin 08/03/2023 0.5  0.0 - 1.2 mg/dL Final    ALT (SGPT) 08/03/2023 21  7 - 45 U/L Final     Patients treated with Sulfasalazine may generate    falsely decreased results for ALT.    Cholesterol 08/03/2023 130  0 - 199 mg/dL Final    .      AGE      DESIRABLE   BORDERLINE HIGH   HIGH     0-19 Y     0 - 169       170 - 199     >/= 200    20-24 Y     0 - 189       190 - 224     >/= 225         >24 Y     0 - 199       200 - 239     >/= 240   **All ranges are based on fasting samples. Specific   therapeutic targets will vary based on patient-specific   cardiac risk.  .   Pediatric guidelines reference:Pediatrics 2011, 128(S5).   Adult guidelines reference: NCEP ATPIII Guidelines,     MARCO 2001, 258:2486-97  .   Venipuncture immediately after or during the    administration of Metamizole may lead to falsely   low results. Testing should be performed immediately   prior to Metamizole dosing.    HDL 08/03/2023 50.6  mg/dL Final    .      AGE      VERY LOW   LOW     NORMAL    HIGH       0-19 Y       < 35   < 40     40-45     ----    20-24 Y       ----   < 40       >45     ----      >24 Y       ----   < 40     40-60      >60  .    Cholesterol/HDL Ratio 08/03/2023 2.6   Final    REF VALUES  DESIRABLE  < 3.4  HIGH RISK  > 5.0    LDL 08/03/2023 65  0 - 99 mg/dL Final    .                           NEAR      BORD      AGE      DESIRABLE  OPTIMAL    HIGH     HIGH     VERY HIGH     0-19 Y     0 - 109     ---    110-129   >/= 130     ----    20-24 Y     0 - 119     ---    120-159   >/= 160     ----      >24 Y     0 -  99   100-129  130-159   160-189     >/=190  .    VLDL 08/03/2023 15  0  - 40 mg/dL Final    Triglycerides 08/03/2023 74  0 - 149 mg/dL Final    .      AGE      DESIRABLE   BORDERLINE HIGH   HIGH     VERY HIGH   0 D-90 D    19 - 174         ----         ----        ----  91 D- 9 Y     0 -  74        75 -  99     >/= 100      ----    10-19 Y     0 -  89        90 - 129     >/= 130      ----    20-24 Y     0 - 114       115 - 149     >/= 150      ----         >24 Y     0 - 149       150 - 199    200- 499    >/= 500  .   Venipuncture immediately after or during the    administration of Metamizole may lead to falsely   low results. Testing should be performed immediately   prior to Metamizole dosing.    WBC 08/03/2023 4.6  4.4 - 11.3 x10E9/L Final    nRBC 08/03/2023 0.0  0.0 - 0.0 /100 WBC Final    RBC 08/03/2023 5.42 (H)  4.00 - 5.20 x10E12/L Final    Hemoglobin 08/03/2023 13.5  12.0 - 16.0 g/dL Final    Hematocrit 08/03/2023 41.9  36.0 - 46.0 % Final    MCV 08/03/2023 77 (L)  80 - 100 fL Final    MCHC 08/03/2023 32.2  32.0 - 36.0 g/dL Final    Platelets 08/03/2023 245  150 - 450 x10E9/L Final    RDW 08/03/2023 12.9  11.5 - 14.5 % Final    Hemoglobin A1C 08/03/2023 5.1  % Final         Diagnosis of Diabetes-Adults   Non-Diabetic: < or = 5.6%   Increased risk for developing diabetes: 5.7-6.4%   Diagnostic of diabetes: > or = 6.5%  .       Monitoring of Diabetes                Age (y)     Therapeutic Goal (%)   Adults:          >18           <7.0   Pediatrics:    13-18           <7.5                   7-12           <8.0                   0- 6            7.5-8.5   American Diabetes Association. Diabetes Care 33(S1), Jan 2010.    Estimated Average Glucose 08/03/2023 100  MG/DL Final        Assessment/Plan     Problem List Items Addressed This Visit       Hyperglycemia    Mixed hyperlipidemia    Hyperthyroidism    Gallstones    Tachycardia - Primary       Patient is aware of LIMITATIONS of VIRTUAL VISITS and how-of course-an IN PERSON VISIT IS always ideal or preferred. IF condition  deteriorates from here-ER IS DEFINITELY ADVISED.    Sees endo  Sees cardio  Sees surgery -upcoming lap melia-planned for when euthyroid  Aware of signs/sxs of all med conditions that necessitate er-cp/sob palpitations delmis HR>120  Rare use xanax hasn't taken lately  Options for thyroid-d/w pt  Now on tapazole 20mg bid  Pt reports she did NOT feel it was cardiac-if recurs agrees to er  Still advised d/w CARDIO   tsh ft4 cmp cbc

## 2023-10-17 NOTE — PROGRESS NOTES
C19: x3  Flu: Decline  Pneum: 12/2019  PAP: hysterectomy 1990  Mamm: 07/23  ColoRect: 01/23  PHQ 2: UTD

## 2023-10-24 ENCOUNTER — LAB (OUTPATIENT)
Dept: LAB | Facility: LAB | Age: 68
End: 2023-10-24
Payer: MEDICARE

## 2023-10-24 ENCOUNTER — APPOINTMENT (OUTPATIENT)
Dept: CARDIOLOGY | Facility: CLINIC | Age: 68
End: 2023-10-24
Payer: MEDICARE

## 2023-10-24 DIAGNOSIS — E05.00 GRAVES DISEASE: ICD-10-CM

## 2023-10-24 DIAGNOSIS — K80.20 GALLSTONES: ICD-10-CM

## 2023-10-24 DIAGNOSIS — R00.0 TACHYCARDIA: ICD-10-CM

## 2023-10-24 DIAGNOSIS — E05.90 HYPERTHYROIDISM: ICD-10-CM

## 2023-10-24 DIAGNOSIS — R73.9 HYPERGLYCEMIA: ICD-10-CM

## 2023-10-24 DIAGNOSIS — E78.2 MIXED HYPERLIPIDEMIA: ICD-10-CM

## 2023-10-24 LAB
ALBUMIN SERPL BCP-MCNC: 3.9 G/DL (ref 3.4–5)
ALP SERPL-CCNC: 216 U/L (ref 33–136)
ALT SERPL W P-5'-P-CCNC: 19 U/L (ref 7–45)
ANION GAP SERPL CALC-SCNC: 11 MMOL/L (ref 10–20)
AST SERPL W P-5'-P-CCNC: 12 U/L (ref 9–39)
BASOPHILS # BLD AUTO: 0.04 X10*3/UL (ref 0–0.1)
BASOPHILS NFR BLD AUTO: 0.6 %
BILIRUB SERPL-MCNC: 0.4 MG/DL (ref 0–1.2)
BUN SERPL-MCNC: 14 MG/DL (ref 6–23)
CALCIUM SERPL-MCNC: 9.2 MG/DL (ref 8.6–10.3)
CHLORIDE SERPL-SCNC: 104 MMOL/L (ref 98–107)
CO2 SERPL-SCNC: 29 MMOL/L (ref 21–32)
CREAT SERPL-MCNC: 0.65 MG/DL (ref 0.5–1.05)
EOSINOPHIL # BLD AUTO: 0.14 X10*3/UL (ref 0–0.7)
EOSINOPHIL NFR BLD AUTO: 2.2 %
ERYTHROCYTE [DISTWIDTH] IN BLOOD BY AUTOMATED COUNT: 13.9 % (ref 11.5–14.5)
GFR SERPL CREATININE-BSD FRML MDRD: >90 ML/MIN/1.73M*2
GLUCOSE SERPL-MCNC: 71 MG/DL (ref 74–99)
HCT VFR BLD AUTO: 42.8 % (ref 36–46)
HGB BLD-MCNC: 13.5 G/DL (ref 12–16)
IMM GRANULOCYTES # BLD AUTO: 0.01 X10*3/UL (ref 0–0.7)
IMM GRANULOCYTES NFR BLD AUTO: 0.2 % (ref 0–0.9)
LYMPHOCYTES # BLD AUTO: 3.06 X10*3/UL (ref 1.2–4.8)
LYMPHOCYTES NFR BLD AUTO: 49.1 %
MCH RBC QN AUTO: 25.1 PG (ref 26–34)
MCHC RBC AUTO-ENTMCNC: 31.5 G/DL (ref 32–36)
MCV RBC AUTO: 80 FL (ref 80–100)
MONOCYTES # BLD AUTO: 0.55 X10*3/UL (ref 0.1–1)
MONOCYTES NFR BLD AUTO: 8.8 %
NEUTROPHILS # BLD AUTO: 2.43 X10*3/UL (ref 1.2–7.7)
NEUTROPHILS NFR BLD AUTO: 39.1 %
NRBC BLD-RTO: 0 /100 WBCS (ref 0–0)
PLATELET # BLD AUTO: 281 X10*3/UL (ref 150–450)
PMV BLD AUTO: 9.9 FL (ref 7.5–11.5)
POTASSIUM SERPL-SCNC: 4.3 MMOL/L (ref 3.5–5.3)
PROT SERPL-MCNC: 6.4 G/DL (ref 6.4–8.2)
RBC # BLD AUTO: 5.37 X10*6/UL (ref 4–5.2)
SODIUM SERPL-SCNC: 140 MMOL/L (ref 136–145)
T3 SERPL-MCNC: 138 NG/DL (ref 60–200)
T4 FREE SERPL-MCNC: 0.75 NG/DL (ref 0.61–1.12)
TSH SERPL-ACNC: <0.01 MIU/L (ref 0.44–3.98)
WBC # BLD AUTO: 6.2 X10*3/UL (ref 4.4–11.3)

## 2023-10-24 PROCEDURE — 84439 ASSAY OF FREE THYROXINE: CPT

## 2023-10-24 PROCEDURE — 36415 COLL VENOUS BLD VENIPUNCTURE: CPT

## 2023-10-24 PROCEDURE — 84480 ASSAY TRIIODOTHYRONINE (T3): CPT

## 2023-10-24 PROCEDURE — 84443 ASSAY THYROID STIM HORMONE: CPT

## 2023-10-24 PROCEDURE — 85025 COMPLETE CBC W/AUTO DIFF WBC: CPT

## 2023-10-24 PROCEDURE — 80053 COMPREHEN METABOLIC PANEL: CPT

## 2023-10-26 ENCOUNTER — TELEPHONE (OUTPATIENT)
Dept: ENDOCRINOLOGY | Facility: HOSPITAL | Age: 68
End: 2023-10-26
Payer: MEDICARE

## 2023-10-26 DIAGNOSIS — E05.90 HYPERTHYROIDISM: Primary | ICD-10-CM

## 2023-10-30 ENCOUNTER — TELEPHONE (OUTPATIENT)
Dept: PEDIATRIC ENDOCRINOLOGY | Facility: CLINIC | Age: 68
End: 2023-10-30
Payer: MEDICARE

## 2023-10-30 NOTE — TELEPHONE ENCOUNTER
Spoke with patient regarding labs:  -TSH <0.01, FT4 0.75, T3 138  -CMP notable for continued elevation of alkaline phosphatase (which will take months to normalize) and ANC stable at 2.43.     Initial plan was for patient to decrease methimazole from 20mg BID to 10mg BID (which she did) after obtaining labs on 10/24. However given her significant decrease in FT4 and T3 levels, we would actually like to cut back the dose further to 10mg daily. Patient agreeable. Will get follow-up labs done in about 3 weeks.     Plan discussed with Dr. Nickerson.

## 2023-11-02 PROBLEM — D22.9 ATYPICAL MOLE: Status: RESOLVED | Noted: 2023-09-30 | Resolved: 2023-11-02

## 2023-11-04 ENCOUNTER — TELEMEDICINE (OUTPATIENT)
Dept: PRIMARY CARE | Facility: CLINIC | Age: 68
End: 2023-11-04
Payer: MEDICARE

## 2023-11-04 VITALS
SYSTOLIC BLOOD PRESSURE: 136 MMHG | BODY MASS INDEX: 30.61 KG/M2 | DIASTOLIC BLOOD PRESSURE: 77 MMHG | HEIGHT: 61 IN | HEART RATE: 50 BPM

## 2023-11-04 DIAGNOSIS — R73.9 HYPERGLYCEMIA: ICD-10-CM

## 2023-11-04 DIAGNOSIS — E05.90 HYPERTHYROIDISM: ICD-10-CM

## 2023-11-04 DIAGNOSIS — I10 ESSENTIAL HYPERTENSION: ICD-10-CM

## 2023-11-04 DIAGNOSIS — D05.11 DUCTAL CARCINOMA IN SITU (DCIS) OF RIGHT BREAST: ICD-10-CM

## 2023-11-04 DIAGNOSIS — E78.2 MIXED HYPERLIPIDEMIA: ICD-10-CM

## 2023-11-04 DIAGNOSIS — F41.9 ANXIETY: ICD-10-CM

## 2023-11-04 PROCEDURE — 99213 OFFICE O/P EST LOW 20 MIN: CPT | Performed by: FAMILY MEDICINE

## 2023-11-04 NOTE — PROGRESS NOTES
[phone] (patient aware vv can take place at anytime)    C19: pfizer x 3  Flu: DECLINE  Pneumo: 12/2019  PAP/LMP: HYSTERECTOMY   [place]ari: 07/2023- Cat 2  [place]/Rec: 01/2023- WNL

## 2023-11-04 NOTE — PROGRESS NOTES
"Subjective   Patient ID: Marisol Weiner is a 68 y.o. female who presents and consented for VIRTUAL VISIT ---     VIDEO  C19: pfizer x 3  Flu: DECLINE  Pneumo: 12/2019  PAP/LMP: HYSTERECTOMY   Chari: 07/2023- Cat 2  Swisher/Rec: 01/2023- WNL  Thyroid standpt feels ok  Appetite back    HPI  Patient Active Problem List   Diagnosis    Anxiety    Ductal carcinoma in situ (DCIS) of right breast    Essential hypertension    Hyperglycemia    Mixed hyperlipidemia    Class 1 obesity with body mass index (BMI) of 31.0 to 31.9 in adult    Hyperthyroidism    Gallstones    Weight loss    Tachycardia    Palpitations    Other microscopic hematuria    Numbness in left leg    Nausea in adult    Hip pain, left    Heart murmur, systolic    Fibroadenoma of right breast    Fatigue    Early satiety    Dizziness    Anosmia       Past Surgical History:   Procedure Laterality Date    BREAST BIOPSY Left 09/2020    fibroadenoma    BREAST BIOPSY Right 06/2021    ductal carcinoma in situ    COLONOSCOPY  01/2023    no polyps-due 2033    HYSTERECTOMY  1990       Review of Systems  This patient has   NO history of recent Covid nor flu symptoms,      NO Fever nor chills,  NO Chest pain, shortness of breath nor paroxysmal nocturnal dyspnea,  NO Nausea, vomiting, nor diarrhea,  NO Hematochezia nor melena,  NO Dysuria, hematuria, nor new incontinence issues  NO new severe headaches nor neurological complaints,  NO new issues with anxiety nor depression nor new psychiatric complaints,  NO suicidal nor homicidal ideations.     OBJECTIVE:  /77 (BP Location: Left arm, Patient Position: Sitting)   Pulse 50   Ht 1.549 m (5' 1\")   LMP 01/01/1990 (Approximate)   BMI 30.61 kg/m²      General:  alert, oriented, no acute distress.  Mood is pleasant, not tearful, no signs of emotional distress.  Not appearing intoxicated or altered.   No voiced delusions,   Normal, appropriate behavior.    HEENT: Normocephalic, atraumatic,   Pupils round, reactive to " light  Extraocular motions intact and wnl    Conversing sans difficulty does NOT appear to be short of breath-in severe pain or toxic appearing    Appears to be in baseline health      Lab on 10/24/2023   Component Date Value Ref Range Status    WBC 10/24/2023 6.2  4.4 - 11.3 x10*3/uL Final    nRBC 10/24/2023 0.0  0.0 - 0.0 /100 WBCs Final    RBC 10/24/2023 5.37 (H)  4.00 - 5.20 x10*6/uL Final    Hemoglobin 10/24/2023 13.5  12.0 - 16.0 g/dL Final    Hematocrit 10/24/2023 42.8  36.0 - 46.0 % Final    MCV 10/24/2023 80  80 - 100 fL Final    MCH 10/24/2023 25.1 (L)  26.0 - 34.0 pg Final    MCHC 10/24/2023 31.5 (L)  32.0 - 36.0 g/dL Final    RDW 10/24/2023 13.9  11.5 - 14.5 % Final    Platelets 10/24/2023 281  150 - 450 x10*3/uL Final    MPV 10/24/2023 9.9  7.5 - 11.5 fL Final    Neutrophils % 10/24/2023 39.1  40.0 - 80.0 % Final    Immature Granulocytes %, Automated 10/24/2023 0.2  0.0 - 0.9 % Final    Immature Granulocyte Count (IG) includes promyelocytes, myelocytes and metamyelocytes but does not include bands. Percent differential counts (%) should be interpreted in the context of the absolute cell counts (cells/UL).    Lymphocytes % 10/24/2023 49.1  13.0 - 44.0 % Final    Monocytes % 10/24/2023 8.8  2.0 - 10.0 % Final    Eosinophils % 10/24/2023 2.2  0.0 - 6.0 % Final    Basophils % 10/24/2023 0.6  0.0 - 2.0 % Final    Neutrophils Absolute 10/24/2023 2.43  1.20 - 7.70 x10*3/uL Final    Percent differential counts (%) should be interpreted in the context of the absolute cell counts (cells/uL).    Immature Granulocytes Absolute, Au* 10/24/2023 0.01  0.00 - 0.70 x10*3/uL Final    Lymphocytes Absolute 10/24/2023 3.06  1.20 - 4.80 x10*3/uL Final    Monocytes Absolute 10/24/2023 0.55  0.10 - 1.00 x10*3/uL Final    Eosinophils Absolute 10/24/2023 0.14  0.00 - 0.70 x10*3/uL Final    Basophils Absolute 10/24/2023 0.04  0.00 - 0.10 x10*3/uL Final    Thyroid Stimulating Hormone 10/24/2023 <0.01 (L)  0.44 - 3.98 mIU/L Final     Thyroxine, Free 10/24/2023 0.75  0.61 - 1.12 ng/dL Final    Triiodothyronine 10/24/2023 138  60 - 200 ng/dL Final    Glucose 10/24/2023 71 (L)  74 - 99 mg/dL Final    Sodium 10/24/2023 140  136 - 145 mmol/L Final    Potassium 10/24/2023 4.3  3.5 - 5.3 mmol/L Final    Chloride 10/24/2023 104  98 - 107 mmol/L Final    Bicarbonate 10/24/2023 29  21 - 32 mmol/L Final    Anion Gap 10/24/2023 11  10 - 20 mmol/L Final    Urea Nitrogen 10/24/2023 14  6 - 23 mg/dL Final    Creatinine 10/24/2023 0.65  0.50 - 1.05 mg/dL Final    eGFR 10/24/2023 >90  >60 mL/min/1.73m*2 Final    Calculations of estimated GFR are performed using the 2021 CKD-EPI Study Refit equation without the race variable for the IDMS-Traceable creatinine methods.  https://jasn.asnjournals.org/content/early/2021/09/22/ASN.4865073546    Calcium 10/24/2023 9.2  8.6 - 10.3 mg/dL Final    Albumin 10/24/2023 3.9  3.4 - 5.0 g/dL Final    Alkaline Phosphatase 10/24/2023 216 (H)  33 - 136 U/L Final    Total Protein 10/24/2023 6.4  6.4 - 8.2 g/dL Final    AST 10/24/2023 12  9 - 39 U/L Final    Bilirubin, Total 10/24/2023 0.4  0.0 - 1.2 mg/dL Final    ALT 10/24/2023 19  7 - 45 U/L Final    Patients treated with Sulfasalazine may generate falsely decreased results for ALT.   Lab on 10/09/2023   Component Date Value Ref Range Status    WBC 10/09/2023 5.5  4.4 - 11.3 x10*3/uL Final    nRBC 10/09/2023 0.0  0.0 - 0.0 /100 WBCs Final    RBC 10/09/2023 5.09  4.00 - 5.20 x10*6/uL Final    Hemoglobin 10/09/2023 12.9  12.0 - 16.0 g/dL Final    Hematocrit 10/09/2023 40.0  36.0 - 46.0 % Final    MCV 10/09/2023 79 (L)  80 - 100 fL Final    MCH 10/09/2023 25.3 (L)  26.0 - 34.0 pg Final    MCHC 10/09/2023 32.3  32.0 - 36.0 g/dL Final    RDW 10/09/2023 13.1  11.5 - 14.5 % Final    Platelets 10/09/2023 219  150 - 450 x10*3/uL Final    MPV 10/09/2023 9.8  7.5 - 11.5 fL Final    Albumin 10/09/2023 4.0  3.4 - 5.0 g/dL Final    Bilirubin, Total 10/09/2023 0.5  0.0 - 1.2 mg/dL Final     Bilirubin, Direct 10/09/2023 0.1  0.0 - 0.3 mg/dL Final    Alkaline Phosphatase 10/09/2023 179 (H)  33 - 136 U/L Final    ALT 10/09/2023 21  7 - 45 U/L Final    Patients treated with Sulfasalazine may generate falsely decreased results for ALT.    AST 10/09/2023 13  9 - 39 U/L Final    Total Protein 10/09/2023 6.6  6.4 - 8.2 g/dL Final   Lab on 10/03/2023   Component Date Value Ref Range Status    Thyroid Stimulating Hormone 10/03/2023 <0.01 (L)  0.44 - 3.98 mIU/L Final    Thyroxine, Free 10/03/2023 3.00 (H)  0.78 - 1.48 ng/dL Final    Triiodothyronine, Free 10/03/2023 >10.0 (H)  2.3 - 4.2 pg/mL Final    result    WBC 10/03/2023 7.1  4.4 - 11.3 x10*3/uL Final    nRBC 10/03/2023 0.0  0.0 - 0.0 /100 WBCs Final    RBC 10/03/2023 5.26 (H)  4.00 - 5.20 x10*6/uL Final    Hemoglobin 10/03/2023 13.5  12.0 - 16.0 g/dL Final    Hematocrit 10/03/2023 43.1  36.0 - 46.0 % Final    MCV 10/03/2023 82  80 - 100 fL Final    MCH 10/03/2023 25.7 (L)  26.0 - 34.0 pg Final    MCHC 10/03/2023 31.3 (L)  32.0 - 36.0 g/dL Final    RDW 10/03/2023 13.0  11.5 - 14.5 % Final    Platelets 10/03/2023 234  150 - 450 x10*3/uL Final    MPV 10/03/2023 9.8  7.5 - 11.5 fL Final    Neutrophils % 10/03/2023 40.1  40.0 - 80.0 % Final    Immature Granulocytes %, Automated 10/03/2023 0.1  0.0 - 0.9 % Final    Immature Granulocyte Count (IG) includes promyelocytes, myelocytes and metamyelocytes but does not include bands. Percent differential counts (%) should be interpreted in the context of the absolute cell counts (cells/UL).    Lymphocytes % 10/03/2023 45.5  13.0 - 44.0 % Final    Monocytes % 10/03/2023 11.5  2.0 - 10.0 % Final    Eosinophils % 10/03/2023 2.4  0.0 - 6.0 % Final    Basophils % 10/03/2023 0.4  0.0 - 2.0 % Final    Neutrophils Absolute 10/03/2023 2.82  1.20 - 7.70 x10*3/uL Final    Percent differential counts (%) should be interpreted in the context of the absolute cell counts (cells/uL).    Immature Granulocytes Absolute, Au* 10/03/2023  0.01  0.00 - 0.70 x10*3/uL Final    Lymphocytes Absolute 10/03/2023 3.21  1.20 - 4.80 x10*3/uL Final    Monocytes Absolute 10/03/2023 0.81  0.10 - 1.00 x10*3/uL Final    Eosinophils Absolute 10/03/2023 0.17  0.00 - 0.70 x10*3/uL Final    Basophils Absolute 10/03/2023 0.03  0.00 - 0.10 x10*3/uL Final    Triiodothyronine 10/03/2023 364 (H)  60 - 200 ng/dL Final    result    TSI 10/03/2023 5.9 (H)  <=1.3 TSI index Final       Test Performed by:  [facility]  [address] [place] [address]  : Dany Clark M.D. Ph.D.; CLIA# [id number]    Glucose 10/03/2023 99  74 - 99 mg/dL Final    Sodium 10/03/2023 143  136 - 145 mmol/L Final    Potassium 10/03/2023 4.3  3.5 - 5.3 mmol/L Final    Chloride 10/03/2023 105  98 - 107 mmol/L Final    Bicarbonate 10/03/2023 27  21 - 32 mmol/L Final    Anion Gap 10/03/2023 15  10 - 20 mmol/L Final    Urea Nitrogen 10/03/2023 15  6 - 23 mg/dL Final    Creatinine 10/03/2023 0.47 (L)  0.50 - 1.05 mg/dL Final    eGFR 10/03/2023 >90  >60 mL/min/1.73m*2 Final    Calculations of estimated GFR are performed using the 2021 CKD-EPI Study Refit  equation without the race variable for the IDMS-Traceable Creatinine Methods.    https://jasn.asnjournals.org/content/early/2021/09/22/ASN.1020769712    Calcium 10/03/2023 9.9  8.6 - 10.6 mg/dL Final    Albumin 10/03/2023 3.8  3.4 - 5.0 g/dL Final    Alkaline Phosphatase 10/03/2023 181 (H)  33 - 136 U/L Final    Total Protein 10/03/2023 6.9  6.4 - 8.2 g/dL Final    AST 10/03/2023 13  9 - 39 U/L Final    Bilirubin, Total 10/03/2023 0.4  0.0 - 1.2 mg/dL Final    ALT 10/03/2023 28  7 - 45 U/L Final    Patients treated with Sulfasalazine may generate falsely decreased results for ALT.   Lab on 09/13/2023   Component Date Value Ref Range Status    T3, Free 09/13/2023 >10.0 (H)  2.3 - 4.2 pg/mL Final    Glucose 09/13/2023 97  74 - 99 mg/dL Final    Sodium 09/13/2023 142  136 - 145 mmol/L Final     Potassium 09/13/2023 3.9  3.5 - 5.3 mmol/L Final    Chloride 09/13/2023 104  98 - 107 mmol/L Final    Bicarbonate 09/13/2023 31  21 - 32 mmol/L Final    Anion Gap 09/13/2023 11  10 - 20 mmol/L Final    Urea Nitrogen 09/13/2023 14  6 - 23 mg/dL Final    Creatinine 09/13/2023 0.41 (L)  0.50 - 1.05 mg/dL Final    GFR Female 09/13/2023 >90  >90 mL/min/1.73m2 Final     CALCULATIONS OF ESTIMATED GFR ARE PERFORMED   USING THE 2021 CKD-EPI STUDY REFIT EQUATION   WITHOUT THE RACE VARIABLE FOR THE IDMS-TRACEABLE   CREATININE METHODS.    https://jasn.asnjournals.org/content/early/2021/09/22/ASN.8678972549    Calcium 09/13/2023 9.1  8.6 - 10.3 mg/dL Final    Albumin 09/13/2023 3.8  3.4 - 5.0 g/dL Final    Alkaline Phosphatase 09/13/2023 170 (H)  33 - 136 U/L Final    Total Protein 09/13/2023 6.3 (L)  6.4 - 8.2 g/dL Final    AST 09/13/2023 17  9 - 39 U/L Final    Total Bilirubin 09/13/2023 0.5  0.0 - 1.2 mg/dL Final    ALT (SGPT) 09/13/2023 31  7 - 45 U/L Final     Patients treated with Sulfasalazine may generate    falsely decreased results for ALT.    Thyroglobulin 09/13/2023 350.4 (H)  1.3 - 31.8 ng/mL Final    INTERPRETIVE INFORMATION: Thyroglobulin, Serum or Plasma  Specimens negative for thyroglobulin antibodies (TgAb) are tested   for thyroglobulin (Tg) by chemiluminescent immunoassay (CELESTINO) using   the [employer] Access DxI method. Specimens with TgAb results   above the upper reference limit are tested for Tg by   high-performance liquid chromatography-tandem mass spectrometry   (LC-MS/MS). Results obtained with different test methods or kits   cannot be used interchangeably. Tg results, regardless of   concentration, should not be interpreted as absolute evidence for   the presence or absence of papillary or follicular thyroid cancer.   Tg testing is not recommended for use as a screening procedure to   detect the presence of thyroid cancer in the general population.    Thyroglobulin LC-MS/MS 09/13/2023 Not  Applicable  1.3 - 31.8 ng/mL Final    INTERPRETIVE INFORMATION: Thyroglobulin by LC-MS/MS, Serum/Plasma  Lower limit of detection for Thyroglobulin by LC-MS/MS is 0.5   ng/mL.  This test was developed and its performance characteristics   determined by [facility]. It has not been cleared or   approved by the US Food and Drug Administration. This test was   performed in a CLIA certified laboratory and is intended for   clinical purposes.  Performed By: [facility]  [address] [place] [id number]  : Hema Mccray MD, PhD  CLIA Number: [id number]    Anti-Thyroglobulin AB 09/13/2023 <0.9  0.0 - 4.0 IU/mL Final    INTERPRETIVE INFORMATION: Thyroglobulin Antibody                                    A value of 4.0 IU/mL or less indicates a negative result for   thyroglobulin antibodies.  The Thyroglobulin Antibody assay is being performed using the   [employer] Access DxI method.    Antithyroid Peroxidase Ab 09/13/2023 35  IU/mL Final    Negative: <=60 U/mL  Positive:  >60 U/mL   Orders Only on 09/07/2023   Component Date Value Ref Range Status    Glucose 09/07/2023 92  74 - 99 mg/dL Final    Sodium 09/07/2023 142  136 - 145 mmol/L Final    Potassium 09/07/2023 3.7  3.5 - 5.3 mmol/L Final    Chloride 09/07/2023 105  98 - 107 mmol/L Final    Bicarbonate 09/07/2023 29  21 - 32 mmol/L Final    Anion Gap 09/07/2023 12  10 - 20 mmol/L Final    Urea Nitrogen 09/07/2023 11  6 - 23 mg/dL Final    Creatinine 09/07/2023 0.38 (L)  0.50 - 1.05 mg/dL Final    GFR Female 09/07/2023 >90  >90 mL/min/1.73m2 Final    Comment:  CALCULATIONS OF ESTIMATED GFR ARE PERFORMED   USING THE 2021 CKD-EPI STUDY REFIT EQUATION   WITHOUT THE RACE VARIABLE FOR THE IDMS-TRACEABLE   CREATININE METHODS.    https://jasn.asnjournals.org/content/early/2021/09/22/ASN.7587403632      Calcium 09/07/2023 9.8  8.6 - 10.3 mg/dL Final    TSH 09/07/2023 <0.01 (L)  0.44 - 3.98 mIU/L Final    Comment:  TSH testing is  performed using different testing    methodology at [facility] than at other    [facility]. Direct result comparisons should    only be made within the same method.      Free T4 09/07/2023 4.67 (H)  0.61 - 1.12 ng/dL Final    Comment:  Thyroxine Free testing is performed using different testing    methodology at [facility] than at other    [facility]. Direct result comparisons should    only be made within the same method.  .   Biotin can cause falsely elevated free T4 results. Patients   taking a Biotin dose of up to 10 mg/day should refrain from   taking Biotin for 24 hours before sample collection. Patient   taking a Biotin dose of >10 mg/day should consult with their   physician or the laboratory before the blood draw.     Lab on 08/15/2023   Component Date Value Ref Range Status    Vit D, 1,25-Dihydroxy 08/15/2023 50.2  19.9 - 79.3 pg/mL Final    INTERPRETIVE INFORMATION: Vitamin D, 1,25-Dihydroxy  This test is primarily indicated during patient evaluation for   hypercalcemia and renal failure. A normal result does not rule out   Vitamin D deficiency. The recommended test for diagnosing Vitamin   D deficiency is Vitamin D 25-hydroxy.  Performed By: [facility]  [address] [place] [id number]  : Hema Mccray MD, PhD  CLIA Number: [id number]   Lab on 08/08/2023   Component Date Value Ref Range Status    Glucose 08/08/2023 89  74 - 99 mg/dL Final    Sodium 08/08/2023 142  136 - 145 mmol/L Final    Potassium 08/08/2023 3.7  3.5 - 5.3 mmol/L Final    Chloride 08/08/2023 105  98 - 107 mmol/L Final    Bicarbonate 08/08/2023 27  21 - 32 mmol/L Final    Anion Gap 08/08/2023 14  10 - 20 mmol/L Final    Urea Nitrogen 08/08/2023 14  6 - 23 mg/dL Final    Creatinine 08/08/2023 0.39 (L)  0.50 - 1.05 mg/dL Final    GFR Female 08/08/2023 >90  >90 mL/min/1.73m2 Final     CALCULATIONS OF ESTIMATED GFR ARE PERFORMED   USING THE 2021 CKD-EPI STUDY  REFIT EQUATION   WITHOUT THE RACE VARIABLE FOR THE IDMS-TRACEABLE   CREATININE METHODS.    https://jasn.asnjournals.org/content/early/2021/09/22/ASN.1203866093    Calcium 08/08/2023 10.3  8.6 - 10.3 mg/dL Final    Albumin 08/08/2023 3.9  3.4 - 5.0 g/dL Final    Alkaline Phosphatase 08/08/2023 171 (H)  33 - 136 U/L Final    Total Protein 08/08/2023 7.3  6.4 - 8.2 g/dL Final    AST 08/08/2023 19  9 - 39 U/L Final    Total Bilirubin 08/08/2023 0.5  0.0 - 1.2 mg/dL Final    ALT (SGPT) 08/08/2023 25  7 - 45 U/L Final     Patients treated with Sulfasalazine may generate    falsely decreased results for ALT.    Liver Fraction: 08/08/2023 74  0 - 94 U/L Final    INTERPRETIVE INFORMATION: Alk-Phosphatase Liver Calc  Bone Specific Alkaline Phosphatase (5745016) and 5'-nucleotidase   (5450525) may be useful in identifying disorders of bone and   liver, respectively.    Bone Fraction: 08/08/2023 117 (H)  0 - 55 U/L Final    Alkaline Phosphatase, Other 08/08/2023 0  U/L Final    Performed By: [facility]  [address] [place] [address]  : Hema Mccray MD, PhD  CLIA Number: [id number]    Alkaline Phosphatase 08/08/2023 191 (H)  40 - 120 U/L Final    Ferritin 08/08/2023 852 (H)  8 - 150 ug/L Final    Vitamin B-12 08/08/2023 610  211 - 911 pg/mL Final    EBV VCA IgG Antibody 08/08/2023 POSITIVE (A)  NEGATIVE Final    LARY-PATRICIA VIRUS EARLY ANTIGEN A* 08/08/2023 NEGATIVE  NEGATIVE Final    EBV Nuclear Ag Ab 08/08/2023 POSITIVE (A)  NEGATIVE Final    EBV VCA IgM Antibody 08/08/2023 NEGATIVE  NEGATIVE Final    EBV Interpretation 08/08/2023 SEE BELOW   Final    .                       EBV INTERPRETATION CHART  .                 VCA-IGG  VCA-IGM  NA-IGG  EA-IGG  .                 --------------------------------  PRIMARY ACUTE       +/-      +/-      -      +/-  LATE ACUTE           +       +/-     +/-     +/-  RECOVERING           +        -       -       +  PREVIOUS INFECTION   +        -       +/-      -        Assessment/Plan     Problem List Items Addressed This Visit       Anxiety    Ductal carcinoma in situ (DCIS) of right breast    Essential hypertension    Hyperglycemia    Mixed hyperlipidemia    Hyperthyroidism       Patient is aware of LIMITATIONS of VIRTUAL VISITS and how-of course-an IN PERSON VISIT IS always ideal or preferred. IF condition deteriorates from here-ER IS DEFINITELY ADVISED.  Labs again 11-14  Cmp post GB   She needs to call surgeon-she will this wk  Alk phos elevation d/w pt  Occ mild vague back pain- could be gb  See me 3mo

## 2023-11-08 ENCOUNTER — OFFICE VISIT (OUTPATIENT)
Dept: CARDIOLOGY | Facility: CLINIC | Age: 68
End: 2023-11-08
Payer: MEDICARE

## 2023-11-08 VITALS
SYSTOLIC BLOOD PRESSURE: 122 MMHG | HEIGHT: 61 IN | WEIGHT: 167.4 LBS | DIASTOLIC BLOOD PRESSURE: 62 MMHG | HEART RATE: 50 BPM | BODY MASS INDEX: 31.6 KG/M2

## 2023-11-08 DIAGNOSIS — R00.2 PALPITATIONS: Primary | ICD-10-CM

## 2023-11-08 DIAGNOSIS — I10 ESSENTIAL HYPERTENSION: ICD-10-CM

## 2023-11-08 DIAGNOSIS — R01.1 HEART MURMUR, SYSTOLIC: ICD-10-CM

## 2023-11-08 DIAGNOSIS — E05.90 HYPERTHYROIDISM: ICD-10-CM

## 2023-11-08 PROBLEM — R79.89 ABNORMAL THYROID BLOOD TEST: Status: ACTIVE | Noted: 2023-11-08

## 2023-11-08 PROBLEM — Z71.2 ENCOUNTER TO DISCUSS TEST RESULTS: Status: ACTIVE | Noted: 2023-11-08

## 2023-11-08 PROBLEM — R74.8 ELEVATED ALKALINE PHOSPHATASE LEVEL: Status: ACTIVE | Noted: 2023-11-08

## 2023-11-08 PROCEDURE — 1159F MED LIST DOCD IN RCRD: CPT | Performed by: INTERNAL MEDICINE

## 2023-11-08 PROCEDURE — 3074F SYST BP LT 130 MM HG: CPT | Performed by: INTERNAL MEDICINE

## 2023-11-08 PROCEDURE — 3078F DIAST BP <80 MM HG: CPT | Performed by: INTERNAL MEDICINE

## 2023-11-08 PROCEDURE — 1036F TOBACCO NON-USER: CPT | Performed by: INTERNAL MEDICINE

## 2023-11-08 PROCEDURE — 99214 OFFICE O/P EST MOD 30 MIN: CPT | Performed by: INTERNAL MEDICINE

## 2023-11-08 PROCEDURE — 1126F AMNT PAIN NOTED NONE PRSNT: CPT | Performed by: INTERNAL MEDICINE

## 2023-11-08 PROCEDURE — 1160F RVW MEDS BY RX/DR IN RCRD: CPT | Performed by: INTERNAL MEDICINE

## 2023-11-08 RX ORDER — METOPROLOL SUCCINATE 25 MG/1
25 TABLET, EXTENDED RELEASE ORAL DAILY
Qty: 90 TABLET | Refills: 2 | Status: SHIPPED | OUTPATIENT
Start: 2023-11-08 | End: 2024-02-06 | Stop reason: WASHOUT

## 2023-11-08 RX ORDER — AMLODIPINE BESYLATE 2.5 MG/1
2.5 TABLET ORAL DAILY
Qty: 90 TABLET | Refills: 2 | Status: SHIPPED | OUTPATIENT
Start: 2023-11-08 | End: 2024-02-06 | Stop reason: ALTCHOICE

## 2023-11-08 NOTE — PROGRESS NOTES
FU from 9/6/2023 :    Subjective :   Was seen by endocrinology, methimazole was started, previously at 40 mg daily, now down to 10 mg daily, she is gaining some weight, palpitations have resolved, noticing that blood pressure tends to run low, but overall feeling much improved.  After the initial medication changes, she reports lightheadedness and near syncope, this morning she did not take her medications and her blood pressure is 122/62.  Heart rates have been in the high 40s and low 50s.  Overall feeling much improved.    On physical examination she has a grade 2/6 to 3/6 crescendo decrescendo murmur along the left sternal border, that does not particularly change with posture, I did not have her squats today, I did not appreciate a click, no diastolic murmur is audible   Her lungs are clear and there is no peripheral edema    History so Far :  1. Hypertension  2. Palpitations  3. Elevated resting heart rate  4. Dizziness  5. Not orthostatic  6. Hyperlipidemia  7. Hirsutism  8. Hypokalemia, consider hyperaldosteronism.  9. Unintentional weight loss  10. Systolic murmur left sternal border with increased gradient across the aortic valve/LVOT without significant aortic stenosis.  11. Cholelithiasis without cholecystitis-ultrasound August 2023, normal liver  12. Echocardiogram August 2023-LVEF 60 to 65% indeterminate diastolic function mild septal hypertrophy without evidence of significant outflow tract obstruction mild mitral insufficiency mild tricuspid insufficiency RVSP 40 mmHg calcification of aortic valvular tips significant gradient through the aortic valve peak 31 mean 20 left atrial diameter 3.1 left atrial volume index 22, ascending aorta 3 cm, LV wall thickness 0.8 cm, dimensionless index of the aortic valve 0.49         Objective      Wt Readings from Last 3 Encounters:   11/08/23 75.9 kg (167 lb 6.4 oz)   10/17/23 73.5 kg (162 lb)   10/03/23 75.8 kg (167 lb)          Meds:  Current Outpatient  Medications   Medication Instructions    ALPRAZolam (XANAX) 0.25 mg, oral, 3 times daily PRN    amLODIPine (NORVASC) 5 mg, oral, Daily    anastrozole (ARIMIDEX) 1 mg, oral, Daily    atorvastatin (LIPITOR) 40 mg, oral, Nightly    cholecalciferol (Vitamin D-3) 50 mcg (2,000 unit) capsule 1 capsule, oral, Daily    magnesium oxide (Mag-Ox) 400 mg (241.3 mg magnesium) tablet 1 tablet, oral, 2 times daily    methIMAzole (TAPAZOLE) 10 mg, oral, 2 times daily    metoprolol succinate XL (Toprol-XL) 50 mg 24 hr tablet 1 tablet, oral, Daily    spironolactone (Aldactone) 25 mg tablet 1 tablet, oral, Daily          Allergies   Allergen Reactions    Codeine Other     uncontrollable giddiness             LABS:    Lab Results   Component Value Date    WBC 6.2 10/24/2023    HGB 13.5 10/24/2023    HCT 42.8 10/24/2023     10/24/2023    CHOL 130 08/03/2023    TRIG 74 08/03/2023    HDL 50.6 08/03/2023    ALT 19 10/24/2023    AST 12 10/24/2023     10/24/2023    K 4.3 10/24/2023     10/24/2023    CREATININE 0.65 10/24/2023    BUN 14 10/24/2023    CO2 29 10/24/2023    TSH <0.01 (L) 10/24/2023    HGBA1C 5.1 08/03/2023                   Problem List:    Patient Active Problem List    Diagnosis Date Noted    Tachycardia 09/30/2023    Palpitations 09/30/2023    Other microscopic hematuria 09/30/2023    Numbness in left leg 09/30/2023    Nausea in adult 09/30/2023    Hip pain, left 09/30/2023    Heart murmur, systolic 09/30/2023    Fibroadenoma of right breast 09/30/2023    Fatigue 09/30/2023    Early satiety 09/30/2023    Dizziness 09/30/2023    Anosmia 09/30/2023    Weight loss 09/28/2023    Hyperthyroidism 09/20/2023    Gallstones 09/20/2023    Class 1 obesity with body mass index (BMI) of 31.0 to 31.9 in adult 08/08/2023    Anxiety 07/20/2023    Ductal carcinoma in situ (DCIS) of right breast 07/20/2023    Essential hypertension 07/20/2023    Hyperglycemia 07/20/2023    Mixed hyperlipidemia 07/20/2023                  Assessment:    1.  Palpitations-resolved  2.  Sinus bradycardia  3.  Hypothyroidism, dose of methimazole is being tapered down  4.  Significant clinical improvement:  5.  Aortic stenosis, mild to moderate asymptomatic, echo results reviewed  6.  Holter results reviewed  7.  Reviewed laboratory data to include comprehensive profile thyroid function and CBC.  8.  Hypertension-blood pressures running low now on current regimen  9.  Lower extremity edema resolved with down titration of amlodipine and addition of Aldactone     Recommendations:    1.  Decrease amlodipine to 2.5 mg p.o. daily  2.  Decrease metoprolol succinate from 50 mg daily to 25 mg p.o. daily  3.  6-month follow-up sooner if interval problems arise  4.  Hypothyroidism is being addressed by endocrinology.          Follow up : 6 months      Odette Morrison MD

## 2023-11-13 ASSESSMENT — PATIENT HEALTH QUESTIONNAIRE - PHQ9
6. FEELING BAD ABOUT YOURSELF - OR THAT YOU ARE A FAILURE OR HAVE LET YOURSELF OR YOUR FAMILY DOWN: NOT AT ALL
8. MOVING OR SPEAKING SO SLOWLY THAT OTHER PEOPLE COULD HAVE NOTICED. OR THE OPPOSITE, BEING SO FIGETY OR RESTLESS THAT YOU HAVE BEEN MOVING AROUND A LOT MORE THAN USUAL: NOT AT ALL
8. MOVING OR SPEAKING SO SLOWLY THAT OTHER PEOPLE COULD HAVE NOTICED. OR THE OPPOSITE, BEING SO FIGETY OR RESTLESS THAT YOU HAVE BEEN MOVING AROUND A LOT MORE THAN USUAL: NOT AT ALL
3. TROUBLE FALLING OR STAYING ASLEEP OR SLEEPING TOO MUCH: NOT AT ALL
4. FEELING TIRED OR HAVING LITTLE ENERGY: 0
1. LITTLE INTEREST OR PLEASURE IN DOING THINGS: NOT AT ALL
7. TROUBLE CONCENTRATING ON THINGS, SUCH AS READING THE NEWSPAPER OR WATCHING TELEVISION: NOT AT ALL
2. FEELING DOWN, DEPRESSED, IRRITABLE, OR HOPELESS: NOT AT ALL
9. THOUGHTS THAT YOU WOULD BE BETTER OFF DEAD, OR OF HURTING YOURSELF: 0
10. IF YOU CHECKED OFF ANY PROBLEMS, HOW DIFFICULT HAVE THESE PROBLEMS MADE IT FOR YOU TO DO YOUR WORK, TAKE CARE OF THINGS AT HOME, OR GET ALONG WITH OTHER PEOPLE: NOT DIFFICULT AT ALL
SUM OF ALL RESPONSES TO PHQ QUESTIONS 1-9: 0
6. FEELING BAD ABOUT YOURSELF - OR THAT YOU ARE A FAILURE OR HAVE LET YOURSELF OR YOUR FAMILY DOWN: 0
7. TROUBLE CONCENTRATING ON THINGS, SUCH AS READING THE NEWSPAPER OR WATCHING TELEVISION: NOT AT ALL
2. FEELING DOWN, DEPRESSED OR HOPELESS: NOT AT ALL
10. IF YOU CHECKED OFF ANY PROBLEMS, HOW DIFFICULT HAVE THESE PROBLEMS MADE IT FOR YOU TO DO YOUR WORK, TAKE CARE OF THINGS AT HOME, OR GET ALONG WITH OTHER PEOPLE: NOT DIFFICULT AT ALL
1. LITTLE INTEREST OR PLEASURE IN DOING THINGS: NOT AT ALL
9. THOUGHTS THAT YOU WOULD BE BETTER OFF DEAD, OR OF HURTING YOURSELF: NOT AT ALL
4. FEELING TIRED OR HAVING LITTLE ENERGY: NOT AT ALL
5. POOR APPETITE OR OVEREATING: NOT AT ALL
6. FEELING BAD ABOUT YOURSELF - OR THAT YOU ARE A FAILURE OR HAVE LET YOURSELF OR YOUR FAMILY DOWN: NOT AT ALL
9. THOUGHTS THAT YOU WOULD BE BETTER OFF DEAD, OR OF HURTING YOURSELF: NOT AT ALL
8. MOVING OR SPEAKING SO SLOWLY THAT OTHER PEOPLE COULD HAVE NOTICED. OR THE OPPOSITE, BEING SO FIGETY OR RESTLESS THAT YOU HAVE BEEN MOVING AROUND A LOT MORE THAN USUAL: 0
10. IF YOU CHECKED OFF ANY PROBLEMS, HOW DIFFICULT HAVE THESE PROBLEMS MADE IT FOR YOU TO DO YOUR WORK, TAKE CARE OF THINGS AT HOME, OR GET ALONG WITH OTHER PEOPLE: NOT DIFFICULT AT ALL
10. IF YOU CHECKED OFF ANY PROBLEMS, HOW DIFFICULT HAVE THESE PROBLEMS MADE IT FOR YOU TO DO YOUR WORK, TAKE CARE OF THINGS AT HOME, OR GET ALONG WITH OTHER PEOPLE: NOT DIFFICULT AT ALL
9. THOUGHTS THAT YOU WOULD BE BETTER OFF DEAD, OR OF HURTING YOURSELF: NOT AT ALL
8. MOVING OR SPEAKING SO SLOWLY THAT OTHER PEOPLE COULD HAVE NOTICED. OR THE OPPOSITE, BEING SO FIGETY OR RESTLESS THAT YOU HAVE BEEN MOVING AROUND A LOT MORE THAN USUAL: NOT AT ALL
3. TROUBLE FALLING OR STAYING ASLEEP OR SLEEPING TOO MUCH: NOT AT ALL
3. TROUBLE FALLING OR STAYING ASLEEP OR SLEEPING TOO MUCH: NOT AT ALL
1. LITTLE INTEREST OR PLEASURE IN DOING THINGS: NOT AT ALL
SUM OF ALL RESPONSES TO PHQ QUESTIONS 1-9: 0
5. POOR APPETITE OR OVEREATING: 0
3. TROUBLE FALLING OR STAYING ASLEEP OR SLEEPING TOO MUCH: 0
3. TROUBLE FALLING OR STAYING ASLEEP OR SLEEPING TOO MUCH: NOT AT ALL
7. TROUBLE CONCENTRATING ON THINGS, SUCH AS READING THE NEWSPAPER OR WATCHING TELEVISION: 0
SUM OF ALL RESPONSES TO PHQ QUESTIONS 1-9: 0
SUM OF ALL RESPONSES TO PHQ QUESTIONS 1-9: 0
1. LITTLE INTEREST OR PLEASURE IN DOING THINGS: 0
1. LITTLE INTEREST OR PLEASURE IN DOING THINGS: NOT AT ALL
6. FEELING BAD ABOUT YOURSELF - OR THAT YOU ARE A FAILURE OR HAVE LET YOURSELF OR YOUR FAMILY DOWN: NOT AT ALL
6. FEELING BAD ABOUT YOURSELF - OR THAT YOU ARE A FAILURE OR HAVE LET YOURSELF OR YOUR FAMILY DOWN: NOT AT ALL
4. FEELING TIRED OR HAVING LITTLE ENERGY: NOT AT ALL
5. POOR APPETITE OR OVEREATING: NOT AT ALL
7. TROUBLE CONCENTRATING ON THINGS, SUCH AS READING THE NEWSPAPER OR WATCHING TELEVISION: NOT AT ALL
4. FEELING TIRED OR HAVING LITTLE ENERGY: NOT AT ALL
5. POOR APPETITE OR OVEREATING: NOT AT ALL
7. TROUBLE CONCENTRATING ON THINGS, SUCH AS READING THE NEWSPAPER OR WATCHING TELEVISION: NOT AT ALL
9. THOUGHTS THAT YOU WOULD BE BETTER OFF DEAD, OR OF HURTING YOURSELF: NOT AT ALL
8. MOVING OR SPEAKING SO SLOWLY THAT OTHER PEOPLE COULD HAVE NOTICED. OR THE OPPOSITE, BEING SO FIGETY OR RESTLESS THAT YOU HAVE BEEN MOVING AROUND A LOT MORE THAN USUAL: NOT AT ALL
4. FEELING TIRED OR HAVING LITTLE ENERGY: NOT AT ALL
2. FEELING DOWN, DEPRESSED OR HOPELESS: NOT AT ALL
5. POOR APPETITE OR OVEREATING: NOT AT ALL
2. FEELING DOWN, DEPRESSED, IRRITABLE, OR HOPELESS: 0
2. FEELING DOWN, DEPRESSED OR HOPELESS: NOT AT ALL

## 2023-11-16 ENCOUNTER — TELEPHONE (OUTPATIENT)
Dept: HEMATOLOGY/ONCOLOGY | Facility: CLINIC | Age: 68
End: 2023-11-16
Payer: MEDICARE

## 2023-11-16 ENCOUNTER — APPOINTMENT (OUTPATIENT)
Dept: HEMATOLOGY/ONCOLOGY | Facility: CLINIC | Age: 68
End: 2023-11-16
Payer: MEDICARE

## 2023-11-16 ASSESSMENT — PATIENT HEALTH QUESTIONNAIRE - PHQ9
7. TROUBLE CONCENTRATING ON THINGS, SUCH AS READING THE NEWSPAPER OR WATCHING TELEVISION: 0
4. FEELING TIRED OR HAVING LITTLE ENERGY: 0
2. FEELING DOWN, DEPRESSED, IRRITABLE, OR HOPELESS: NOT AT ALL
9. THOUGHTS THAT YOU WOULD BE BETTER OFF DEAD, OR OF HURTING YOURSELF: NOT AT ALL
1. LITTLE INTEREST OR PLEASURE IN DOING THINGS: 0
2. FEELING DOWN, DEPRESSED, IRRITABLE, OR HOPELESS: 0
10. IF YOU CHECKED OFF ANY PROBLEMS, HOW DIFFICULT HAVE THESE PROBLEMS MADE IT FOR YOU TO DO YOUR WORK, TAKE CARE OF THINGS AT HOME, OR GET ALONG WITH OTHER PEOPLE: NOT DIFFICULT AT ALL
4. FEELING TIRED OR HAVING LITTLE ENERGY: 0
6. FEELING BAD ABOUT YOURSELF - OR THAT YOU ARE A FAILURE OR HAVE LET YOURSELF OR YOUR FAMILY DOWN: NOT AT ALL
2. FEELING DOWN, DEPRESSED, IRRITABLE, OR HOPELESS: 0
7. TROUBLE CONCENTRATING ON THINGS, SUCH AS READING THE NEWSPAPER OR WATCHING TELEVISION: NOT AT ALL
9. THOUGHTS THAT YOU WOULD BE BETTER OFF DEAD, OR OF HURTING YOURSELF: 0
1. LITTLE INTEREST OR PLEASURE IN DOING THINGS: NOT AT ALL
9. THOUGHTS THAT YOU WOULD BE BETTER OFF DEAD, OR OF HURTING YOURSELF: 0
SUM OF ALL RESPONSES TO PHQ QUESTIONS 1-9: 0
8. MOVING OR SPEAKING SO SLOWLY THAT OTHER PEOPLE COULD HAVE NOTICED. OR THE OPPOSITE, BEING SO FIGETY OR RESTLESS THAT YOU HAVE BEEN MOVING AROUND A LOT MORE THAN USUAL: 0
4. FEELING TIRED OR HAVING LITTLE ENERGY: NOT AT ALL
5. POOR APPETITE OR OVEREATING: 0
1. LITTLE INTEREST OR PLEASURE IN DOING THINGS: 0
7. TROUBLE CONCENTRATING ON THINGS, SUCH AS READING THE NEWSPAPER OR WATCHING TELEVISION: 0
5. POOR APPETITE OR OVEREATING: NOT AT ALL
6. FEELING BAD ABOUT YOURSELF - OR THAT YOU ARE A FAILURE OR HAVE LET YOURSELF OR YOUR FAMILY DOWN: 0
6. FEELING BAD ABOUT YOURSELF - OR THAT YOU ARE A FAILURE OR HAVE LET YOURSELF OR YOUR FAMILY DOWN: NOT AT ALL
2. FEELING DOWN, DEPRESSED, IRRITABLE, OR HOPELESS: NOT AT ALL
3. TROUBLE FALLING OR STAYING ASLEEP OR SLEEPING TOO MUCH: NOT AT ALL
1. LITTLE INTEREST OR PLEASURE IN DOING THINGS: NOT AT ALL
8. MOVING OR SPEAKING SO SLOWLY THAT OTHER PEOPLE COULD HAVE NOTICED. OR THE OPPOSITE, BEING SO FIGETY OR RESTLESS THAT YOU HAVE BEEN MOVING AROUND A LOT MORE THAN USUAL: NOT AT ALL
3. TROUBLE FALLING OR STAYING ASLEEP OR SLEEPING TOO MUCH: 0
8. MOVING OR SPEAKING SO SLOWLY THAT OTHER PEOPLE COULD HAVE NOTICED. OR THE OPPOSITE, BEING SO FIGETY OR RESTLESS THAT YOU HAVE BEEN MOVING AROUND A LOT MORE THAN USUAL: 0
10. IF YOU CHECKED OFF ANY PROBLEMS, HOW DIFFICULT HAVE THESE PROBLEMS MADE IT FOR YOU TO DO YOUR WORK, TAKE CARE OF THINGS AT HOME, OR GET ALONG WITH OTHER PEOPLE: NOT DIFFICULT AT ALL
5. POOR APPETITE OR OVEREATING: NOT AT ALL
8. MOVING OR SPEAKING SO SLOWLY THAT OTHER PEOPLE COULD HAVE NOTICED. OR THE OPPOSITE, BEING SO FIGETY OR RESTLESS THAT YOU HAVE BEEN MOVING AROUND A LOT MORE THAN USUAL: NOT AT ALL
5. POOR APPETITE OR OVEREATING: 0
3. TROUBLE FALLING OR STAYING ASLEEP OR SLEEPING TOO MUCH: 0
3. TROUBLE FALLING OR STAYING ASLEEP OR SLEEPING TOO MUCH: NOT AT ALL
4. FEELING TIRED OR HAVING LITTLE ENERGY: NOT AT ALL
7. TROUBLE CONCENTRATING ON THINGS, SUCH AS READING THE NEWSPAPER OR WATCHING TELEVISION: NOT AT ALL
6. FEELING BAD ABOUT YOURSELF - OR THAT YOU ARE A FAILURE OR HAVE LET YOURSELF OR YOUR FAMILY DOWN: 0
SUM OF ALL RESPONSES TO PHQ QUESTIONS 1-9: 0
9. THOUGHTS THAT YOU WOULD BE BETTER OFF DEAD, OR OF HURTING YOURSELF: NOT AT ALL

## 2023-11-16 NOTE — TELEPHONE ENCOUNTER
Patient called office back asking to move appointment from Friday to Thursday same week.  Dr. oJhn's schedule could not accommodate this.  Patient agreeable to moving to 12/7/23 with Dr. John.

## 2023-11-16 NOTE — TELEPHONE ENCOUNTER
VM  Patient asking what appointment with Dr. John this afternoon is for?  May have to reschedule.

## 2023-11-16 NOTE — TELEPHONE ENCOUNTER
I spoke with Marisol and did let her know that this appointment would need to be in person for a breast exam. She did let me know that she will needs to come in earlier in the AM. Appointment canceled today and appointment was rescheduled. She was appreciative and denied further questions. I encouraged her to call back if appointments needs to be adjusted.

## 2023-11-17 ENCOUNTER — LAB (OUTPATIENT)
Dept: LAB | Facility: LAB | Age: 68
End: 2023-11-17
Payer: MEDICARE

## 2023-11-17 DIAGNOSIS — E05.90 HYPERTHYROIDISM: ICD-10-CM

## 2023-11-17 LAB
T3 SERPL-MCNC: 68 NG/DL (ref 60–200)
T4 FREE SERPL-MCNC: 0.34 NG/DL (ref 0.61–1.12)
TSH SERPL-ACNC: 1.6 MIU/L (ref 0.44–3.98)

## 2023-11-17 PROCEDURE — 84480 ASSAY TRIIODOTHYRONINE (T3): CPT

## 2023-11-17 PROCEDURE — 36415 COLL VENOUS BLD VENIPUNCTURE: CPT

## 2023-11-17 PROCEDURE — 84443 ASSAY THYROID STIM HORMONE: CPT

## 2023-11-17 PROCEDURE — 84439 ASSAY OF FREE THYROXINE: CPT

## 2023-11-20 DIAGNOSIS — E05.90 HYPERTHYROIDISM: Primary | ICD-10-CM

## 2023-11-21 NOTE — PROGRESS NOTES
Sent Ms. Weiner a message through [employer]:    Hi Ms. Weiner, your thyroid continues to respond very well to the methimazole. We would like to decrease the dose further from 10mg everyday to 5mg everyday (which should be 1/2 tablet). Please plan on repeating thyroid labs again in 3 weeks. Please also schedule a follow-up with Dr. Nickerson at his next availability (he is booking out a bit currently). The phone number for scheduling is [phone].     Plan discussed with Dr. Nickerson.

## 2023-12-01 ENCOUNTER — APPOINTMENT (OUTPATIENT)
Dept: HEMATOLOGY/ONCOLOGY | Facility: CLINIC | Age: 68
End: 2023-12-01
Payer: MEDICARE

## 2023-12-02 DIAGNOSIS — E78.2 MIXED HYPERLIPIDEMIA: ICD-10-CM

## 2023-12-02 RX ORDER — ATORVASTATIN CALCIUM 40 MG/1
40 TABLET, FILM COATED ORAL NIGHTLY
Qty: 90 TABLET | Refills: 3 | Status: SHIPPED | OUTPATIENT
Start: 2023-12-02

## 2023-12-03 NOTE — PROGRESS NOTES
Patient ID: Marisol Weiner is a 68 y.o. female.    Cancer History:   Treatment Synopsis:    1.Right breast DCIS, +/+/-  - dx on screening mammogram on 4/29/2021   - dx mammogram 5/25/2021:10:00 4cfn, 2.4cm mass  - bx 6/2/2021 and she was found to have atypical hyperplasia involving a fibroadenoma and surgical incision was recommend for atypia  - 6/25/2021 partial mastectomy: low grade DCIS,  lateral margins 1 mm, all others greater than 2 mm.    Subjective    HPI  Ms. Marisol Weiner is a 67 y/o F who presents for follow-up for right breast DCIS. Currently on Arimidex.     Since last visit, patient reports that she is being treated for a new diagnosis of Graves' disease. Currently on methimazole and much better controlled. Denies any side effects from the Arimidex. No new joint pain, hot flashes, fevers or chills. Denies any new chest pain, cough or shortness of breath. No new GI issues. Denies any new lumps, bumps or rashes. No other complaints today. Patient is due for next mammogram in July 2024.     Patient's past medical history, surgical history, family history and social history reviewed.     Objective    Vitals:    12/07/23 1129   BP: 127/71   Pulse: 51   Resp: 16   Temp: 36.4 °C (97.5 °F)   SpO2: 95%      LMP 01/01/1990 (Approximate)      Review of Systems:   Review of Systems:    Positive per HPI, otherwise negative.     Physical Exam:   Constitutional: Patient appears in no acute distress.   Sitting comfortably in chair.  Eyes: EOMI, clear sclera  ENMT: mucous membranes moist, no apparent injury  Head/Neck: Neck supple, no JVD  Respiratory/Thorax: Patent airways, CTAB, normal  breath sounds, no increased work of breathing  Cardiovascular: Regular, rate and rhythm, no murmurs  Gastrointestinal: Nondistended, soft, non-tender,  no rebound tenderness or guarding, no masses palpable  Extremities: normal extremities, no cyanosis edema,  no swelling  Neurological: alert and oriented x3, nonfocal, normal   speech and hearing  Lymphatic: No palpable lymphadenopathy in cervical,  axillary  lymph nodes.  Spleen appears normal size.  Psychological: Appropriate mood and behavior, normal  affect  Skin: Warm and dry, no lesions, no rashes     Performance Status:  Symptomatic; fully ambulatory    Labs/Imaging/Pathology: personally reviewed reports and images in Epic electronic medical record system. Pertinent results as it related to the plan represented in below in assessment and plan.      Assessment/Plan   1. Right breast DCIS ER +95, NM +95, lateral margin 1mm, low grade  - Initially diagnosed after screening mammogram in April 2021. She underwent a right partial mastectomy on 7/25/2021 and was found to have 2 mm margins everywhere but the lateral, where it was 1 mm.  - Her case was discussed at [facility] and radiation therapy and endocrine therapy was recommended, however given her low grade pathology, she was told that adjuvant radiation therapy could be omitted  - We discussed endocrine therapy with Arimidex 1 mg by mouth daily  - We reviewed side effects including but not limited to bone loss, weight gain, hot flashes, mood changes.  - Patient does understand the side effects and is willing to try therapy   - We discussed that in order to prevent recurrence of DCIS and a possibly more invasive disease, we will obtain a baseline DEXA abdominal will recheck every 2 years while on therapy  - We discussed the importance of calcium and vitamin D     6/8/22  - No evidence of recurrence on exam today.   - Patient overall feels well   - Tolerating Arimidex outside of hot flashes that she states are manageable  - She will be due for a mammogram next month  - I will see her 4 months after that in November  [facility] for follow-up in November 11/16/22:  - No evidence of recurrence on exam today.  - She will be due again for mammogram with Dr. Lao in July 2023.   - We will have her see Maribell in March for breast exam and  assess of symptoms on Arimidex, and I will see her again in November 2023.     3/21/2023:  - No evidence of recurrence on today's exam  - Will continue her Arimidex, which she is tolerating well   - DEXA before next FUV  - Will monitor her weight at home and call her PCP if weight loss continues, believed to be related to appetite changes after beginning Metformin June 2022  - Mammogram July 2023 with Dr Lao  - RTC already scheduled in November with Dr. Leta John, BRAYANA prior  - At least 20 minutes of direct consultation was spent with the patient today     12/7/23:  - Since last visit, patient did have a new diagnosis of Graves' disease. Currently on methimazole and much better controlled.  - She did lose weight during this time. She therefore did not get her bone density and we will reschedule her for February timeframe and follow-up with me as a phone visit to discuss.  - She is planned for a mammogram in July 2024 and then Ricki can see her in 1 year.  - RTC with Ricki in 1 year.    2. Osteopenia   - Recently had bone density and was found have osteopenia  - Given long term AI use, I do recommend calcium and vitamin d and we discussed bisphosphonate therapy and I would recommend Alendronate weekly     3. Long term aromatise inhibitor use   - As above  - Will obtain a baseline DEXA and advised patient to start calcium and vitamin d  - DEXA due summer 2023     3. History HTN   4. History of HLD  - Followed by PCP    RTC with Ricki in 1 year. This note has been transcribed using a medical scribe and there is a possibility of unintentional typing misprints.      There are no diagnoses linked to this encounter.    Leta John MD  Hematology/Oncology  [facility]    Scribe Attestation  By signing my name below, I, Yashira Washington attest that this documentation has been prepared under the direction and in the presence of Leta John MD.

## 2023-12-05 ENCOUNTER — OFFICE VISIT (OUTPATIENT)
Dept: ENDOCRINOLOGY | Facility: CLINIC | Age: 68
End: 2023-12-05
Payer: MEDICARE

## 2023-12-05 VITALS
HEART RATE: 55 BPM | WEIGHT: 172 LBS | HEIGHT: 61 IN | BODY MASS INDEX: 32.47 KG/M2 | SYSTOLIC BLOOD PRESSURE: 134 MMHG | DIASTOLIC BLOOD PRESSURE: 73 MMHG

## 2023-12-05 DIAGNOSIS — E05.90 HYPERTHYROIDISM: Primary | ICD-10-CM

## 2023-12-05 DIAGNOSIS — E05.00 GRAVES DISEASE: ICD-10-CM

## 2023-12-05 PROCEDURE — 1125F AMNT PAIN NOTED PAIN PRSNT: CPT | Performed by: INTERNAL MEDICINE

## 2023-12-05 PROCEDURE — 3078F DIAST BP <80 MM HG: CPT | Performed by: INTERNAL MEDICINE

## 2023-12-05 PROCEDURE — 1159F MED LIST DOCD IN RCRD: CPT | Performed by: INTERNAL MEDICINE

## 2023-12-05 PROCEDURE — 1160F RVW MEDS BY RX/DR IN RCRD: CPT | Performed by: INTERNAL MEDICINE

## 2023-12-05 PROCEDURE — 1036F TOBACCO NON-USER: CPT | Performed by: INTERNAL MEDICINE

## 2023-12-05 PROCEDURE — 99214 OFFICE O/P EST MOD 30 MIN: CPT | Performed by: INTERNAL MEDICINE

## 2023-12-05 PROCEDURE — 3075F SYST BP GE 130 - 139MM HG: CPT | Performed by: INTERNAL MEDICINE

## 2023-12-05 ASSESSMENT — PAIN SCALES - GENERAL: PAINLEVEL: 4

## 2023-12-05 NOTE — PROGRESS NOTES
Chief complaint: Follow up on hyperthyroidism    HPI: Marisol Weiner is a 67yo F with PMH of obesity, HTN, DLD, vit D deficiency, gallstones, DCIS of R breast s/p partial mastectomy who is referred due to c/f hyperthyroidism. We saw her in Oct 2023 and diagnosed her with Grave's disease and significant hyperthyroid Sx. We started her on MMI and the dose was lowered gradually to 5 mg daily as of 2 weeks ago.     Interval HX: improved in all sx of hyperthyroidism; less bowel movement; no palpitations; gained weight; no sweat or hot flashes; eyes less teary     No new meds otherwise   ROS : Otherwise negative     PE:  GEN: NAD, AAOX3  HEENT : No noted exophthalmos, lid retraction, or lid lag. No chemosis or tearing.   NECK: horizontal scar noted over anterior neck, less palpable thyroid tissue on the R, thick isthmus and nodular thyroid noted on the L  CV:S1.S2,RRR  Neuro : no FND, minimal delay in DTR, no  tremor on outstretched hands  LE: no peripheral edema; no myopathy  Skin: warm and dry.   Psych: Appropriate mood and behavior   Impression/ Plan    Doing well; early hypothyroid sx; will keep on same dose of MMI (5 mg/day)   Labs monthly x 3; return then. Discuss long term management ; favor continuing MMI; would not use NI in light of the minimal eye sx.     Geno Nickerson MD

## 2023-12-07 ENCOUNTER — OFFICE VISIT (OUTPATIENT)
Dept: HEMATOLOGY/ONCOLOGY | Facility: CLINIC | Age: 68
End: 2023-12-07
Payer: MEDICARE

## 2023-12-07 VITALS
SYSTOLIC BLOOD PRESSURE: 127 MMHG | WEIGHT: 173.06 LBS | OXYGEN SATURATION: 95 % | RESPIRATION RATE: 16 BRPM | TEMPERATURE: 97.5 F | HEART RATE: 51 BPM | DIASTOLIC BLOOD PRESSURE: 71 MMHG | BODY MASS INDEX: 32.7 KG/M2

## 2023-12-07 DIAGNOSIS — D05.11 DUCTAL CARCINOMA IN SITU (DCIS) OF RIGHT BREAST: Primary | ICD-10-CM

## 2023-12-07 PROCEDURE — 3074F SYST BP LT 130 MM HG: CPT | Performed by: INTERNAL MEDICINE

## 2023-12-07 PROCEDURE — 3078F DIAST BP <80 MM HG: CPT | Performed by: INTERNAL MEDICINE

## 2023-12-07 PROCEDURE — 1036F TOBACCO NON-USER: CPT | Performed by: INTERNAL MEDICINE

## 2023-12-07 PROCEDURE — 99214 OFFICE O/P EST MOD 30 MIN: CPT | Performed by: INTERNAL MEDICINE

## 2023-12-07 PROCEDURE — 1160F RVW MEDS BY RX/DR IN RCRD: CPT | Performed by: INTERNAL MEDICINE

## 2023-12-07 PROCEDURE — 1125F AMNT PAIN NOTED PAIN PRSNT: CPT | Performed by: INTERNAL MEDICINE

## 2023-12-07 PROCEDURE — 1159F MED LIST DOCD IN RCRD: CPT | Performed by: INTERNAL MEDICINE

## 2023-12-07 ASSESSMENT — PAIN SCALES - GENERAL: PAINLEVEL: 3

## 2023-12-13 ENCOUNTER — LAB (OUTPATIENT)
Dept: LAB | Facility: LAB | Age: 68
End: 2023-12-13
Payer: MEDICARE

## 2023-12-13 DIAGNOSIS — E05.90 HYPERTHYROIDISM: ICD-10-CM

## 2023-12-13 DIAGNOSIS — E05.90 HYPERTHYROIDISM: Primary | ICD-10-CM

## 2023-12-13 LAB
T3 SERPL-MCNC: 79 NG/DL (ref 60–200)
T4 FREE SERPL-MCNC: 0.28 NG/DL (ref 0.61–1.12)
TSH SERPL-ACNC: 44.93 MIU/L (ref 0.44–3.98)

## 2023-12-13 PROCEDURE — 36415 COLL VENOUS BLD VENIPUNCTURE: CPT

## 2023-12-13 PROCEDURE — 84443 ASSAY THYROID STIM HORMONE: CPT

## 2023-12-13 PROCEDURE — 84480 ASSAY TRIIODOTHYRONINE (T3): CPT

## 2023-12-13 PROCEDURE — 84439 ASSAY OF FREE THYROXINE: CPT

## 2023-12-13 RX ORDER — METHIMAZOLE 5 MG/1
2.5 TABLET ORAL
Qty: 45 TABLET | Refills: 2 | Status: SHIPPED | OUTPATIENT
Start: 2023-12-13

## 2023-12-13 NOTE — PROGRESS NOTES
Sent Ms. Weiner the following [employer] message and sent a new prescription to her pharmacy. She is currently on methimazole 5mg daily.    Hi Ms. Weiner, your thyroid continues to respond very quickly to the methimazole. We definitely need to cut back further! I would like for you to start taking methimazole 2.5mg daily with repeat labs again in about 3 weeks. I believe you still have the 10mg tablets. I will send a new prescription to your pharmacy for the 5mg tablets which you can cut in half instead.     Take care!

## 2023-12-15 ENCOUNTER — TELEPHONE (OUTPATIENT)
Dept: CARDIOLOGY | Facility: CLINIC | Age: 68
End: 2023-12-15
Payer: MEDICARE

## 2023-12-15 ENCOUNTER — TELEPHONE (OUTPATIENT)
Dept: PEDIATRIC GASTROENTEROLOGY | Facility: CLINIC | Age: 68
End: 2023-12-15
Payer: MEDICARE

## 2023-12-15 DIAGNOSIS — E05.90 HYPERTHYROIDISM: Primary | ICD-10-CM

## 2023-12-15 NOTE — TELEPHONE ENCOUNTER
Able to reach patient. She had left message wanting to discuss methimazole and other symptoms she was experiencing. She received the [employer] message and is starting the lower dose of methimazole (2.5mg daily) today. She reports that the past few days, she has had some issues with feeling tired and having some dizziness. Yesterday, she realized that she had not been drinking very much water or eating much and her symptoms resolved when she did these things. She reports being on spironolactone for her HTN and she recalls that she needs to drink plenty of water and eat food with this medication otherwise she has experienced these symptoms with this medication before.     She also reported dry eye, has difficulty using artificial tears but encouraged her to try.     She also reported that she is still taking the metoprolol succinate 25mg daily prescribed by her cardiologist. Informed patient that she no longer needs this from a thyroid perspective and to touch base with her cardiologist if they would still like her on it for another reason.     Patient to complete labs at the beginning of January s/p dose adjustment of methimazole.

## 2023-12-15 NOTE — TELEPHONE ENCOUNTER
Patient called office stating she is having episodes of dizziness again.  Patient states the dizziness can happen with activity and while just sitting.  She states even moving her eyes makes her dizzy.  Patient monitors BP at home and has the following readings:  12/15/23: 126/73  Pulse:  52.  Readings from earlier in the week:  BP: 133/76 Pulse 50,  BP: 112/70 Pulse: 60,   BP: 121/71 Pulse: 57.  Note to Dr. Morrison for review.  Patient's next follow up is 5/8/24.  Saida Fox, CMA

## 2023-12-18 NOTE — TELEPHONE ENCOUNTER
12/18/23  Patient called office back with an additional question.  She wanted to know if the Metoprol Succinate is being used for cardiac reasons or to treat her hyperthyroidism.  Patient states the endocrinologist informed her the medication is not needed from a thyroid standpoint but she would not have the patient stop taking it if it was being used for her heart.  Note to Dr. Morrison for review.  Saida Fox, CMA

## 2024-01-03 ENCOUNTER — LAB (OUTPATIENT)
Dept: LAB | Facility: LAB | Age: 69
End: 2024-01-03
Payer: MEDICARE

## 2024-01-03 DIAGNOSIS — E05.90 HYPERTHYROIDISM: ICD-10-CM

## 2024-01-03 LAB
ALBUMIN SERPL BCP-MCNC: 4.1 G/DL (ref 3.4–5)
ALP SERPL-CCNC: 257 U/L (ref 33–136)
ALT SERPL W P-5'-P-CCNC: 16 U/L (ref 7–45)
ANION GAP SERPL CALC-SCNC: 12 MMOL/L (ref 10–20)
AST SERPL W P-5'-P-CCNC: 13 U/L (ref 9–39)
BASOPHILS # BLD AUTO: 0.04 X10*3/UL (ref 0–0.1)
BASOPHILS NFR BLD AUTO: 0.8 %
BILIRUB SERPL-MCNC: 0.6 MG/DL (ref 0–1.2)
BUN SERPL-MCNC: 13 MG/DL (ref 6–23)
CALCIUM SERPL-MCNC: 9.3 MG/DL (ref 8.6–10.3)
CHLORIDE SERPL-SCNC: 103 MMOL/L (ref 98–107)
CO2 SERPL-SCNC: 28 MMOL/L (ref 21–32)
CREAT SERPL-MCNC: 0.71 MG/DL (ref 0.5–1.05)
EOSINOPHIL # BLD AUTO: 0.06 X10*3/UL (ref 0–0.7)
EOSINOPHIL NFR BLD AUTO: 1.2 %
ERYTHROCYTE [DISTWIDTH] IN BLOOD BY AUTOMATED COUNT: 15.7 % (ref 11.5–14.5)
GFR SERPL CREATININE-BSD FRML MDRD: >90 ML/MIN/1.73M*2
GLUCOSE SERPL-MCNC: 85 MG/DL (ref 74–99)
HCT VFR BLD AUTO: 45.6 % (ref 36–46)
HGB BLD-MCNC: 14.7 G/DL (ref 12–16)
IMM GRANULOCYTES # BLD AUTO: 0 X10*3/UL (ref 0–0.7)
IMM GRANULOCYTES NFR BLD AUTO: 0 % (ref 0–0.9)
LYMPHOCYTES # BLD AUTO: 2.54 X10*3/UL (ref 1.2–4.8)
LYMPHOCYTES NFR BLD AUTO: 51.3 %
MCH RBC QN AUTO: 27.8 PG (ref 26–34)
MCHC RBC AUTO-ENTMCNC: 32.2 G/DL (ref 32–36)
MCV RBC AUTO: 86 FL (ref 80–100)
MONOCYTES # BLD AUTO: 0.47 X10*3/UL (ref 0.1–1)
MONOCYTES NFR BLD AUTO: 9.5 %
NEUTROPHILS # BLD AUTO: 1.84 X10*3/UL (ref 1.2–7.7)
NEUTROPHILS NFR BLD AUTO: 37.2 %
NRBC BLD-RTO: 0 /100 WBCS (ref 0–0)
PLATELET # BLD AUTO: 273 X10*3/UL (ref 150–450)
POTASSIUM SERPL-SCNC: 4.3 MMOL/L (ref 3.5–5.3)
PROT SERPL-MCNC: 7.1 G/DL (ref 6.4–8.2)
RBC # BLD AUTO: 5.29 X10*6/UL (ref 4–5.2)
SODIUM SERPL-SCNC: 139 MMOL/L (ref 136–145)
T3 SERPL-MCNC: 103 NG/DL (ref 60–200)
T4 FREE SERPL-MCNC: 0.52 NG/DL (ref 0.61–1.12)
TSH SERPL-ACNC: 5.67 MIU/L (ref 0.44–3.98)
WBC # BLD AUTO: 5 X10*3/UL (ref 4.4–11.3)

## 2024-01-03 PROCEDURE — 84480 ASSAY TRIIODOTHYRONINE (T3): CPT

## 2024-01-03 PROCEDURE — 85025 COMPLETE CBC W/AUTO DIFF WBC: CPT

## 2024-01-03 PROCEDURE — 84439 ASSAY OF FREE THYROXINE: CPT

## 2024-01-03 PROCEDURE — 80053 COMPREHEN METABOLIC PANEL: CPT

## 2024-01-03 PROCEDURE — 84443 ASSAY THYROID STIM HORMONE: CPT

## 2024-01-03 PROCEDURE — 36415 COLL VENOUS BLD VENIPUNCTURE: CPT

## 2024-01-08 DIAGNOSIS — E05.90 HYPERTHYROIDISM: Primary | ICD-10-CM

## 2024-01-08 DIAGNOSIS — R74.8 ALKALINE PHOSPHATASE ELEVATION: ICD-10-CM

## 2024-01-09 NOTE — PROGRESS NOTES
Sent the following [email or web address] message to the patient:    Hi Ms. Weiner,    Your thyroid levels are responding well to the methimazole 2.5mg daily. We would like for you to continue this dose for now. The alkaline phosphatase level is increasing just a bit. This is often elevated in people with hyperthyroidism and can take a long time to decrease. Just because it is increasing a bit, we will work this up further with your next set of labs in 3 weeks (would aim for the week of 1/28).     Take care!  Paige Pereyra MD     Will work-up the elevated alk phos level with a fractionated alk phos and ggt. Will also repeat TSH, FT4, and T3 levels in 3 weeks as well.    Plan discussed with Dr. Nickerson.

## 2024-01-29 DIAGNOSIS — D05.11 DUCTAL CARCINOMA IN SITU (DCIS) OF RIGHT BREAST: ICD-10-CM

## 2024-01-29 RX ORDER — ANASTROZOLE 1 MG/1
1 TABLET ORAL DAILY
Qty: 30 TABLET | Refills: 3 | Status: SHIPPED | OUTPATIENT
Start: 2024-01-29 | End: 2024-02-08 | Stop reason: SINTOL

## 2024-01-30 ENCOUNTER — LAB (OUTPATIENT)
Dept: LAB | Facility: LAB | Age: 69
End: 2024-01-30
Payer: MEDICARE

## 2024-01-30 DIAGNOSIS — R73.9 HYPERGLYCEMIA: ICD-10-CM

## 2024-01-30 DIAGNOSIS — R00.0 TACHYCARDIA: ICD-10-CM

## 2024-01-30 DIAGNOSIS — E78.2 MIXED HYPERLIPIDEMIA: ICD-10-CM

## 2024-01-30 DIAGNOSIS — E05.00 GRAVES DISEASE: ICD-10-CM

## 2024-01-30 DIAGNOSIS — E05.90 HYPERTHYROIDISM: ICD-10-CM

## 2024-01-30 DIAGNOSIS — K80.20 GALLSTONES: ICD-10-CM

## 2024-01-30 DIAGNOSIS — R74.8 ALKALINE PHOSPHATASE ELEVATION: ICD-10-CM

## 2024-01-30 LAB
BASOPHILS # BLD AUTO: 0.03 X10*3/UL (ref 0–0.1)
BASOPHILS NFR BLD AUTO: 0.7 %
EOSINOPHIL # BLD AUTO: 0.04 X10*3/UL (ref 0–0.4)
EOSINOPHIL NFR BLD AUTO: 0.9 %
ERYTHROCYTE [DISTWIDTH] IN BLOOD BY AUTOMATED COUNT: 14 % (ref 11.5–14.5)
GGT SERPL-CCNC: 19 U/L (ref 5–55)
HCT VFR BLD AUTO: 46.9 % (ref 36–46)
HGB BLD-MCNC: 15.1 G/DL (ref 12–16)
IMM GRANULOCYTES # BLD AUTO: 0.01 X10*3/UL (ref 0–0.5)
IMM GRANULOCYTES NFR BLD AUTO: 0.2 % (ref 0–0.9)
LYMPHOCYTES # BLD AUTO: 2.19 X10*3/UL (ref 0.8–3)
LYMPHOCYTES NFR BLD AUTO: 47.8 %
MCH RBC QN AUTO: 27.6 PG (ref 26–34)
MCHC RBC AUTO-ENTMCNC: 32.2 G/DL (ref 32–36)
MCV RBC AUTO: 86 FL (ref 80–100)
MONOCYTES # BLD AUTO: 0.43 X10*3/UL (ref 0.05–0.8)
MONOCYTES NFR BLD AUTO: 9.4 %
NEUTROPHILS # BLD AUTO: 1.88 X10*3/UL (ref 1.6–5.5)
NEUTROPHILS NFR BLD AUTO: 41 %
NRBC BLD-RTO: 0 /100 WBCS (ref 0–0)
PLATELET # BLD AUTO: 259 X10*3/UL (ref 150–450)
RBC # BLD AUTO: 5.48 X10*6/UL (ref 4–5.2)
T3 SERPL-MCNC: 111 NG/DL (ref 60–200)
T4 FREE SERPL-MCNC: 0.6 NG/DL (ref 0.61–1.12)
TSH SERPL-ACNC: 5.79 MIU/L (ref 0.44–3.98)
WBC # BLD AUTO: 4.6 X10*3/UL (ref 4.4–11.3)

## 2024-01-30 PROCEDURE — 82977 ASSAY OF GGT: CPT

## 2024-01-30 PROCEDURE — 84078 ASSAY ALKALINE PHOSPHATASE: CPT

## 2024-01-30 PROCEDURE — 84480 ASSAY TRIIODOTHYRONINE (T3): CPT

## 2024-01-30 PROCEDURE — 84439 ASSAY OF FREE THYROXINE: CPT

## 2024-01-30 PROCEDURE — 83036 HEMOGLOBIN GLYCOSYLATED A1C: CPT

## 2024-01-30 PROCEDURE — 36415 COLL VENOUS BLD VENIPUNCTURE: CPT

## 2024-01-30 PROCEDURE — 85025 COMPLETE CBC W/AUTO DIFF WBC: CPT

## 2024-01-30 PROCEDURE — 84443 ASSAY THYROID STIM HORMONE: CPT

## 2024-01-31 ENCOUNTER — APPOINTMENT (OUTPATIENT)
Dept: RADIOLOGY | Facility: HOSPITAL | Age: 69
End: 2024-01-31
Payer: MEDICARE

## 2024-01-31 ENCOUNTER — HOSPITAL ENCOUNTER (EMERGENCY)
Facility: HOSPITAL | Age: 69
Discharge: HOME | End: 2024-02-01
Attending: STUDENT IN AN ORGANIZED HEALTH CARE EDUCATION/TRAINING PROGRAM
Payer: MEDICARE

## 2024-01-31 ENCOUNTER — APPOINTMENT (OUTPATIENT)
Dept: CARDIOLOGY | Facility: HOSPITAL | Age: 69
End: 2024-01-31
Payer: MEDICARE

## 2024-01-31 DIAGNOSIS — R42 DIZZINESS: Primary | ICD-10-CM

## 2024-01-31 LAB
ALBUMIN SERPL BCP-MCNC: 4.1 G/DL (ref 3.4–5)
ALP SERPL-CCNC: 226 U/L (ref 33–136)
ALT SERPL W P-5'-P-CCNC: 15 U/L (ref 7–45)
ANION GAP SERPL CALC-SCNC: 9 MMOL/L (ref 10–20)
AST SERPL W P-5'-P-CCNC: 14 U/L (ref 9–39)
ATRIAL RATE: 71 BPM
BASOPHILS # BLD AUTO: 0.03 X10*3/UL (ref 0–0.1)
BASOPHILS NFR BLD AUTO: 0.6 %
BILIRUB SERPL-MCNC: 0.5 MG/DL (ref 0–1.2)
BNP SERPL-MCNC: 16 PG/ML (ref 0–99)
BUN SERPL-MCNC: 12 MG/DL (ref 6–23)
CALCIUM SERPL-MCNC: 9.7 MG/DL (ref 8.6–10.3)
CARDIAC TROPONIN I PNL SERPL HS: <3 NG/L (ref 0–13)
CARDIAC TROPONIN I PNL SERPL HS: <3 NG/L (ref 0–13)
CHLORIDE SERPL-SCNC: 102 MMOL/L (ref 98–107)
CO2 SERPL-SCNC: 32 MMOL/L (ref 21–32)
CREAT SERPL-MCNC: 0.89 MG/DL (ref 0.5–1.05)
D DIMER PPP FEU-MCNC: 388 NG/ML FEU
EGFRCR SERPLBLD CKD-EPI 2021: 69 ML/MIN/1.73M*2
EOSINOPHIL # BLD AUTO: 0.06 X10*3/UL (ref 0–0.4)
EOSINOPHIL NFR BLD AUTO: 1.2 %
ERYTHROCYTE [DISTWIDTH] IN BLOOD BY AUTOMATED COUNT: 14.2 % (ref 11.5–14.5)
EST. AVERAGE GLUCOSE BLD GHB EST-MCNC: 100 MG/DL
FLUAV RNA RESP QL NAA+PROBE: NOT DETECTED
FLUBV RNA RESP QL NAA+PROBE: NOT DETECTED
GLUCOSE SERPL-MCNC: 88 MG/DL (ref 74–99)
HBA1C MFR BLD: 5.1 %
HCT VFR BLD AUTO: 48.3 % (ref 36–46)
HGB BLD-MCNC: 15 G/DL (ref 12–16)
IMM GRANULOCYTES # BLD AUTO: 0.01 X10*3/UL (ref 0–0.5)
IMM GRANULOCYTES NFR BLD AUTO: 0.2 % (ref 0–0.9)
LYMPHOCYTES # BLD AUTO: 1.99 X10*3/UL (ref 0.8–3)
LYMPHOCYTES NFR BLD AUTO: 39.1 %
MCH RBC QN AUTO: 26.8 PG (ref 26–34)
MCHC RBC AUTO-ENTMCNC: 31.1 G/DL (ref 32–36)
MCV RBC AUTO: 86 FL (ref 80–100)
MONOCYTES # BLD AUTO: 0.54 X10*3/UL (ref 0.05–0.8)
MONOCYTES NFR BLD AUTO: 10.6 %
NEUTROPHILS # BLD AUTO: 2.46 X10*3/UL (ref 1.6–5.5)
NEUTROPHILS NFR BLD AUTO: 48.3 %
NRBC BLD-RTO: 0 /100 WBCS (ref 0–0)
P AXIS: 62 DEGREES
P OFFSET: 188 MS
P ONSET: 140 MS
PLATELET # BLD AUTO: 269 X10*3/UL (ref 150–450)
POTASSIUM SERPL-SCNC: 4.2 MMOL/L (ref 3.5–5.3)
PR INTERVAL: 176 MS
PROT SERPL-MCNC: 7.9 G/DL (ref 6.4–8.2)
Q ONSET: 228 MS
QRS COUNT: 12 BEATS
QRS DURATION: 74 MS
QT INTERVAL: 382 MS
QTC CALCULATION(BAZETT): 415 MS
QTC FREDERICIA: 404 MS
R AXIS: 24 DEGREES
RBC # BLD AUTO: 5.59 X10*6/UL (ref 4–5.2)
SARS-COV-2 RNA RESP QL NAA+PROBE: NOT DETECTED
SODIUM SERPL-SCNC: 139 MMOL/L (ref 136–145)
T AXIS: 52 DEGREES
T OFFSET: 419 MS
T4 FREE SERPL-MCNC: 0.64 NG/DL (ref 0.61–1.12)
TSH SERPL-ACNC: 5.27 MIU/L (ref 0.44–3.98)
VENTRICULAR RATE: 71 BPM
WBC # BLD AUTO: 5.1 X10*3/UL (ref 4.4–11.3)

## 2024-01-31 PROCEDURE — 83880 ASSAY OF NATRIURETIC PEPTIDE: CPT | Performed by: STUDENT IN AN ORGANIZED HEALTH CARE EDUCATION/TRAINING PROGRAM

## 2024-01-31 PROCEDURE — 2500000004 HC RX 250 GENERAL PHARMACY W/ HCPCS (ALT 636 FOR OP/ED)

## 2024-01-31 PROCEDURE — 80053 COMPREHEN METABOLIC PANEL: CPT | Performed by: STUDENT IN AN ORGANIZED HEALTH CARE EDUCATION/TRAINING PROGRAM

## 2024-01-31 PROCEDURE — 85025 COMPLETE CBC W/AUTO DIFF WBC: CPT | Performed by: STUDENT IN AN ORGANIZED HEALTH CARE EDUCATION/TRAINING PROGRAM

## 2024-01-31 PROCEDURE — 93005 ELECTROCARDIOGRAM TRACING: CPT

## 2024-01-31 PROCEDURE — 96374 THER/PROPH/DIAG INJ IV PUSH: CPT

## 2024-01-31 PROCEDURE — 71046 X-RAY EXAM CHEST 2 VIEWS: CPT

## 2024-01-31 PROCEDURE — 99284 EMERGENCY DEPT VISIT MOD MDM: CPT | Performed by: STUDENT IN AN ORGANIZED HEALTH CARE EDUCATION/TRAINING PROGRAM

## 2024-01-31 PROCEDURE — 94640 AIRWAY INHALATION TREATMENT: CPT

## 2024-01-31 PROCEDURE — 70450 CT HEAD/BRAIN W/O DYE: CPT

## 2024-01-31 PROCEDURE — 84484 ASSAY OF TROPONIN QUANT: CPT | Performed by: STUDENT IN AN ORGANIZED HEALTH CARE EDUCATION/TRAINING PROGRAM

## 2024-01-31 PROCEDURE — 36415 COLL VENOUS BLD VENIPUNCTURE: CPT | Performed by: STUDENT IN AN ORGANIZED HEALTH CARE EDUCATION/TRAINING PROGRAM

## 2024-01-31 PROCEDURE — 85379 FIBRIN DEGRADATION QUANT: CPT | Performed by: PHYSICIAN ASSISTANT

## 2024-01-31 PROCEDURE — 2500000002 HC RX 250 W HCPCS SELF ADMINISTERED DRUGS (ALT 637 FOR MEDICARE OP, ALT 636 FOR OP/ED)

## 2024-01-31 PROCEDURE — 87636 SARSCOV2 & INF A&B AMP PRB: CPT | Performed by: EMERGENCY MEDICINE

## 2024-01-31 PROCEDURE — 84443 ASSAY THYROID STIM HORMONE: CPT | Performed by: PHYSICIAN ASSISTANT

## 2024-01-31 PROCEDURE — 71046 X-RAY EXAM CHEST 2 VIEWS: CPT | Performed by: RADIOLOGY

## 2024-01-31 PROCEDURE — 84439 ASSAY OF FREE THYROXINE: CPT | Performed by: PHYSICIAN ASSISTANT

## 2024-01-31 PROCEDURE — 99285 EMERGENCY DEPT VISIT HI MDM: CPT | Mod: 25

## 2024-01-31 RX ORDER — LORAZEPAM 2 MG/ML
0.5 INJECTION INTRAMUSCULAR ONCE
Status: COMPLETED | OUTPATIENT
Start: 2024-01-31 | End: 2024-01-31

## 2024-01-31 RX ORDER — IPRATROPIUM BROMIDE AND ALBUTEROL SULFATE 2.5; .5 MG/3ML; MG/3ML
3 SOLUTION RESPIRATORY (INHALATION)
Status: DISCONTINUED | OUTPATIENT
Start: 2024-01-31 | End: 2024-02-01

## 2024-01-31 RX ORDER — PREDNISONE 20 MG/1
40 TABLET ORAL ONCE
Status: COMPLETED | OUTPATIENT
Start: 2024-01-31 | End: 2024-01-31

## 2024-01-31 RX ORDER — LORAZEPAM 2 MG/ML
INJECTION INTRAMUSCULAR
Status: COMPLETED
Start: 2024-01-31 | End: 2024-01-31

## 2024-01-31 RX ORDER — LORAZEPAM 1 MG/1
2 TABLET ORAL ONCE
Status: DISCONTINUED | OUTPATIENT
Start: 2024-01-31 | End: 2024-01-31

## 2024-01-31 RX ADMIN — LORAZEPAM 0.5 MG: 2 INJECTION INTRAMUSCULAR; INTRAVENOUS at 23:41

## 2024-01-31 RX ADMIN — PREDNISONE 40 MG: 20 TABLET ORAL at 20:07

## 2024-01-31 RX ADMIN — IPRATROPIUM BROMIDE AND ALBUTEROL SULFATE 3 ML: .5; 3 SOLUTION RESPIRATORY (INHALATION) at 19:45

## 2024-01-31 RX ADMIN — LORAZEPAM 0.5 MG: 2 INJECTION INTRAMUSCULAR at 23:41

## 2024-01-31 ASSESSMENT — COLUMBIA-SUICIDE SEVERITY RATING SCALE - C-SSRS
6. HAVE YOU EVER DONE ANYTHING, STARTED TO DO ANYTHING, OR PREPARED TO DO ANYTHING TO END YOUR LIFE?: NO
1. IN THE PAST MONTH, HAVE YOU WISHED YOU WERE DEAD OR WISHED YOU COULD GO TO SLEEP AND NOT WAKE UP?: NO
2. HAVE YOU ACTUALLY HAD ANY THOUGHTS OF KILLING YOURSELF?: NO

## 2024-01-31 NOTE — ED TRIAGE NOTES
" TRIAGE NOTE   I saw the patient as the Clinician in Triage and performed a brief history and physical exam, established acuity, and ordered appropriate tests to develop basic plan of care. Patient will be seen by an JANA, resident and/or physician who will independently evaluate the patient. Please see subsequent provider notes for further details and disposition.     Brief HPI: In brief, Marisol Weiner is a 69 y.o. female that presents for shortness of breath since Friday.  Patient states she describes it as more of a air hunger type sensation.  She looked into it on [employer] and tried drinking more water and going outside without improvement.  Over the last day, she has also developed a \"fuzzy\" feeling in her head and a slight dizziness.  She thought it may be one of her medications.  She takes methimazole for Graves' disease, anastrozole for breast cancer remission, spironolactone for unknown reason, amlodipine, and atorvastatin.  Specifically denies chest pain, fevers, or chills.    Focused Physical exam:   No lower extremity edema, breath sounds clear and equal bilaterally, vital signs normal    Plan/MDM:   Brief differential includes hypothyroidism, ACS, dehydration, intracranial pathology, medication side effects, pneumonia, lung mass, or PE given risk factor with cancer history.  Will obtain workup to support these diagnoses.    Please see subsequent provider note for further details and disposition     Yumiko Reyna PA-C   "

## 2024-02-01 VITALS
RESPIRATION RATE: 16 BRPM | WEIGHT: 168 LBS | SYSTOLIC BLOOD PRESSURE: 112 MMHG | OXYGEN SATURATION: 95 % | BODY MASS INDEX: 31.72 KG/M2 | HEART RATE: 94 BPM | HEIGHT: 61 IN | DIASTOLIC BLOOD PRESSURE: 80 MMHG | TEMPERATURE: 97.8 F

## 2024-02-01 LAB — ALP BONE SERPL-MCNC: 67.6 UG/L

## 2024-02-01 PROCEDURE — 70450 CT HEAD/BRAIN W/O DYE: CPT | Performed by: RADIOLOGY

## 2024-02-01 RX ORDER — ACETAMINOPHEN 325 MG/1
TABLET ORAL
Status: DISCONTINUED
Start: 2024-02-01 | End: 2024-02-01 | Stop reason: HOSPADM

## 2024-02-01 RX ORDER — ACETAMINOPHEN 325 MG/1
975 TABLET ORAL ONCE
Status: DISCONTINUED | OUTPATIENT
Start: 2024-02-01 | End: 2024-02-01 | Stop reason: HOSPADM

## 2024-02-01 NOTE — ED PROVIDER NOTES
"HPI   Chief Complaint   Patient presents with    Shortness of Breath     Pt coming in for SOB over the past 2 days that \"comes and goes\", states no fever, no chills, having dizziness and fatigue, has reyes disease. A&ox3.        HPI  Patient is a 69-year-old past medical history of Graves' disease, thyroid disease, diabetes, breast cancer presenting with shortness of breath.  Patient said this shortness of breath has been going and coming for the past month.  Patient says she decided to come in today because she felt lightheaded and woozy.  Overall patient denies any fever, cough, chills, DVT, and CHF.  She has not noticed anything that makes it better or worse but the last time she had the symptoms she was diagnosed with Graves' disease.  Patient has not been in the hospital for a while.  Patient denies any long car rides.                  No data recorded                Patient History   Past Medical History:   Diagnosis Date    Abnormal mammogram 05/2021    cat 4 right breast-DCIS  (9/2020 left breast fibroadenoma)    Asthma     Cancer (CMS/HCC)     Graves' disease     Heart murmur     History of mammogram 07/2023    cat 2 (bilateral diagnostic)    1/2020-cat 1    Hypertension     Hyperthyroidism      Past Surgical History:   Procedure Laterality Date    BREAST BIOPSY Left 09/2020    fibroadenoma    BREAST BIOPSY Right 06/2021    ductal carcinoma in situ    COLONOSCOPY  01/2023    no polyps-due 2033    HYSTERECTOMY  1990     Family History   Problem Relation Name Age of Onset    Breast cancer Mother Anh Khan     Cancer Mother Anh Khan     Other (intestine cancer) Maternal Grandmother Ora Saeed     Cancer Maternal Grandmother Ora Saeed     Heart disease Maternal Grandfather Hari Tipton     Kidney disease Sister Berna Meyers      Social History     Tobacco Use    Smoking status: Never     Passive exposure: Never    Smokeless tobacco: Never   Vaping Use    Vaping Use: Never used   Substance Use Topics    " Alcohol use: Yes     Comment: socially drinks    Drug use: Never       Physical Exam   ED Triage Vitals [01/31/24 1425]   Temperature Heart Rate Respirations BP   36.1 °C (97 °F) 80 18 (!) 145/97      Pulse Ox Temp src Heart Rate Source Patient Position   98 % -- -- --      BP Location FiO2 (%)     -- --       Physical Exam  Constitutional:       Appearance: She is well-developed.   HENT:      Head: Normocephalic and atraumatic.   Eyes:      Extraocular Movements: Extraocular movements intact.      Pupils: Pupils are equal, round, and reactive to light.   Cardiovascular:      Rate and Rhythm: Normal rate and regular rhythm.   Pulmonary:      Effort: Pulmonary effort is normal.      Breath sounds: Normal breath sounds.   Abdominal:      General: Bowel sounds are normal.      Palpations: Abdomen is soft.   Musculoskeletal:         General: Normal range of motion.      Cervical back: Normal range of motion and neck supple.   Skin:     General: Skin is warm.      Capillary Refill: Capillary refill takes 2 to 3 seconds.   Neurological:      General: No focal deficit present.      Mental Status: She is alert and oriented to person, place, and time.         ED Course & MDM        Medical Decision Making  Patient is a 69-year-old past medical history of Graves' disease, hypertension, diabetes, breast cancer presenting with shortness of breath.  The differential diagnoses considered for this patient are hyperthyroidism, pulmonary embolism, , pneumonia, anemia, myocardial infarction and CHF. Patient chest x-ray was negative for any fluid and had a normal BNP.  This makes CHF is less likely.  In addition, patient EKG shows sinus rhythm with no axis deviation, no NSTEMI/STEMI, no long QT, and normal OR interval.  Patient EKG and troponin results rules out myocardial infarction.  Patient CBC showed a hemoglobin of 115 which rules out anemia.  Patient was not tacky, has not had long car ride, cannot ambulate and had a normal  D-dimer so pulmonary embolism was less likely.  Patient chest x-ray was negative for pneumonia.  Patient T3/T4 were normal.  Patient was given DuoNebs with 60 mg of prednisone.  After the medication, I reassessed the patient and patient says she felt better and wanted to go home.  Right before discharge, patient started complaining about headache and says he feels unsteady.  She says one of the worst headache of her life.  Due to this a CT head was ordered.  CTA was negative.  Patient was too sleepy to reassess Dr. Hargrove will reassess the patient.       Procedure  Procedures     Husam Barcenas MD  Resident  01/31/24 2038       Husam Barcenas MD  Resident  01/31/24 2255       Husam Barcenas MD  Resident  02/01/24 0119       Husam Barcenas MD  Resident  02/01/24 0132

## 2024-02-02 ENCOUNTER — TELEPHONE (OUTPATIENT)
Dept: CARDIOLOGY | Facility: CLINIC | Age: 69
End: 2024-02-02
Payer: MEDICARE

## 2024-02-02 PROBLEM — E05.00 GRAVES' DISEASE: Status: ACTIVE | Noted: 2024-02-02

## 2024-02-02 NOTE — TELEPHONE ENCOUNTER
Patient called office stating she has been experiencing daily dizziness and was instructed to call the office per Dr. Morrison.      Patient 's Bps are as noted:  1/29/24- 132/77, -pulse 77  1/30//82, pulse 75  1/31/24 134/77, pulse  91  2/1/24 130/72, pulse 89    Patient states she went to ER at  in [place].  Patient states she had multiple tests including EKG.  She states the ER did not feel her symptoms were heart related but questioned why patient was taking Spironolactone. Patient was informed can offer sooner appointment and instructed to also follow up with primary care.  Patient in agreement with plan and verbalized understanding.  Note to Dr. Morrison as ESE.  Saida Fox, CMA

## 2024-02-03 NOTE — PROGRESS NOTES
Consent:  Verbal consent was requested and obtained from patient on this date for a telehealth visit.    Patient ID: Marisol Weiner is a 69 y.o. female.    Cancer History:   Treatment Synopsis:    1.Right breast DCIS, +/+/-  - dx on screening mammogram on 4/29/2021   - dx mammogram 5/25/2021:10:00 4cfn, 2.4cm mass  - bx 6/2/2021 and she was found to have atypical hyperplasia involving a fibroadenoma and surgical incision was recommend for atypia  - 6/25/2021 partial mastectomy: low grade DCIS,  lateral margins 1 mm, all others greater than 2 mm.     Subjective    HPI  A telephone visit (audio only) between the patient at home and the provider at [facility] [place] was utilized to provide this   telehealth service.     Ms. Marisol Weiner is a  68 y/o F who presents for follow-up for right breast DCIS. Currently on Arimidex.  Bone density with increased osteoporosis.                 Patient's past medical history, surgical history, family history and social history reviewed.     Objective    BSA: There is no height or weight on file to calculate BSA.  LMP 01/01/1990 (Approximate)      Review of Systems:   Review of Systems:    Positive per HPI, otherwise negative.     Physical Exam:   Exam deferred due to telephone call    Performance Status:  Symptomatic; fully ambulatory    Labs/Imaging/Pathology: personally reviewed reports and images in Epic electronic medical record system. Pertinent results as it related to the plan represented in below in assessment and plan.      Assessment/Plan    1. Right breast DCIS ER +95, TN +95, lateral margin 1mm, low grade  - Initially diagnosed after screening mammogram in April 2021. She underwent a right partial mastectomy on 7/25/2021 and was found to have 2 mm margins everywhere but the lateral, where it was 1 mm.  - Her case was discussed at [facility] and radiation therapy and endocrine therapy was recommended, however given her low grade pathology, she was  told that adjuvant radiation therapy could be omitted  - We discussed endocrine therapy with Arimidex 1 mg by mouth daily  - We reviewed side effects including but not limited to bone loss, weight gain, hot flashes, mood changes.  - Patient does understand the side effects and is willing to try therapy   - We discussed that in order to prevent recurrence of DCIS and a possibly more invasive disease, we will obtain a baseline DEXA abdominal will recheck every 2 years while on therapy  - We discussed the importance of calcium and vitamin D  - 5/2022 started arimidex  - 2/6/24- bone density with increased bone loss, -2.4    2/8/24: Telephone call  - discussed increased bone loss on recent bone density 2/6/24  - we discussed arimidex does contribute to bone loss and given DCIS and considering risk vs benefit, would recommend switching to tamoxifen  - discussed side effects of tamoxifen including increased risk of thrombosis  - also discussed starting bisphosphonate and discussed different options including fosamax, prolia, and zometa  - recommend prolia and seeing dental prior which we can prescribe or her primary whom she wishes to discuss with first    2. Osteoporosis  - Recently had bone density with d  - pt hs not been compliant with calcium and vitamin d  - discuss different options with bisphosphonate therapies       RTC in April with Ricki for breast exam and possible prolia if she decides she would like to proceed with it..  This note has been transcribed using a medical scribe and there is a possibility of unintentional typing misprints.      Diagnoses and all orders for this visit:  Ductal carcinoma in situ (DCIS) of right breast    Leta John MD  Hematology/Oncology  [facility]     Scribe Attestation  By signing my name below, Lois GARCIA Scribe attest that this documentation has been prepared under the direction and in the presence of Leta John MD.

## 2024-02-05 DIAGNOSIS — E05.00 GRAVES' DISEASE: Primary | ICD-10-CM

## 2024-02-06 ENCOUNTER — OFFICE VISIT (OUTPATIENT)
Dept: CARDIOLOGY | Facility: CLINIC | Age: 69
End: 2024-02-06
Payer: MEDICARE

## 2024-02-06 ENCOUNTER — TELEPHONE (OUTPATIENT)
Dept: ENDOCRINOLOGY | Facility: HOSPITAL | Age: 69
End: 2024-02-06

## 2024-02-06 ENCOUNTER — HOSPITAL ENCOUNTER (OUTPATIENT)
Dept: RADIOLOGY | Facility: HOSPITAL | Age: 69
Discharge: HOME | End: 2024-02-06
Payer: MEDICARE

## 2024-02-06 VITALS
HEIGHT: 61 IN | SYSTOLIC BLOOD PRESSURE: 110 MMHG | DIASTOLIC BLOOD PRESSURE: 70 MMHG | HEART RATE: 89 BPM | BODY MASS INDEX: 31.53 KG/M2 | WEIGHT: 167 LBS

## 2024-02-06 DIAGNOSIS — R42 DIZZINESS: Primary | ICD-10-CM

## 2024-02-06 DIAGNOSIS — R00.0 TACHYCARDIA: ICD-10-CM

## 2024-02-06 DIAGNOSIS — E78.2 MIXED HYPERLIPIDEMIA: ICD-10-CM

## 2024-02-06 DIAGNOSIS — D05.11 INTRADUCTAL CARCINOMA IN SITU OF RIGHT BREAST: ICD-10-CM

## 2024-02-06 DIAGNOSIS — R00.2 PALPITATIONS: ICD-10-CM

## 2024-02-06 DIAGNOSIS — E05.00 GRAVES' DISEASE: ICD-10-CM

## 2024-02-06 DIAGNOSIS — I10 ESSENTIAL HYPERTENSION: ICD-10-CM

## 2024-02-06 PROCEDURE — 1036F TOBACCO NON-USER: CPT | Performed by: NURSE PRACTITIONER

## 2024-02-06 PROCEDURE — 3074F SYST BP LT 130 MM HG: CPT | Performed by: NURSE PRACTITIONER

## 2024-02-06 PROCEDURE — 1159F MED LIST DOCD IN RCRD: CPT | Performed by: NURSE PRACTITIONER

## 2024-02-06 PROCEDURE — 77080 DXA BONE DENSITY AXIAL: CPT

## 2024-02-06 PROCEDURE — 77080 DXA BONE DENSITY AXIAL: CPT | Performed by: RADIOLOGY

## 2024-02-06 PROCEDURE — 3078F DIAST BP <80 MM HG: CPT | Performed by: NURSE PRACTITIONER

## 2024-02-06 PROCEDURE — 99214 OFFICE O/P EST MOD 30 MIN: CPT | Performed by: NURSE PRACTITIONER

## 2024-02-06 PROCEDURE — 1125F AMNT PAIN NOTED PAIN PRSNT: CPT | Performed by: NURSE PRACTITIONER

## 2024-02-06 RX ORDER — SPIRONOLACTONE 25 MG/1
12.5 TABLET ORAL DAILY
Qty: 30 TABLET | Refills: 2 | Status: SHIPPED | OUTPATIENT
Start: 2024-02-06 | End: 2024-03-26 | Stop reason: WASHOUT

## 2024-02-06 NOTE — PROGRESS NOTES
Sent Ms. Weiner the following [employer] message:      Hi Ms. Weiner,     Thanks for getting your blood work done! Your labs confirm that the elevated alkaline phosphatase level is coming from the bones which is consistent with your thyroid disease; this can take many months to resolve so we are not worried about it. We would like to decrease your methimazole to 5 times per week, just choose any two non-consecutive days to skip. Please plan on repeating labs again during the last week in February.     Take care!  Paige Pereyra MD    Updates lab orders placed for the end of February. Will also recheck TSI at this time.

## 2024-02-06 NOTE — PATIENT INSTRUCTIONS
Please try to increase your water intake, you should try to drink 6 - 8 glasses/bottles of water per day to stay well hydrated.

## 2024-02-06 NOTE — TELEPHONE ENCOUNTER
Called patient as she mentioned she wasn't feeling well. Has been dealing with ongoing dizziness. Was recently evaluated in the ED, no serious abnormalities found. She is seeing her cardiologist today and is planning on seeing her PCP on Friday. Do not suspect that this is thyroid-related or methimazole-related.    Reiterated instructions regarding decreasing the methimazole from 2.5mg daily to 5x/week, choosing two nonconsecutive days to skip. She endorsed understanding and is agreeable to the follow-up bloodwork.

## 2024-02-07 ENCOUNTER — TELEPHONE (OUTPATIENT)
Dept: HEMATOLOGY/ONCOLOGY | Facility: CLINIC | Age: 69
End: 2024-02-07
Payer: MEDICARE

## 2024-02-07 NOTE — PROGRESS NOTES
Marisol Weiner is a 69 y.o. female that presents to the office today with her  for cardiac evaluation.  She follows with her  primary cardiologist Dr. Morrison and  and was added to my schedule today.  PMH as noted below.      She presented to [facility] ER 1/31/24 with shortness of breath and lightheadedness.  Labs were unremarkable, flu/Covid negative, chest x-ray showed bibasilar atelectasis. EKG SR HR 71 bpm, Qt/Qtc 382/415, CT of head negative for acute changes. She was administered resp treatment, Duonebs with noted improvement in her shortness of breath. She states that her breathing has improved but she continues to have episodes of lightheadedness that she describes as wooziness. She denies chest pain, chest pressure, chest tightness, palpitations.  Denies lower extremity edema. She states that she has not taken any medications today as she can only take them with a meal, not an empty stomach. Orthostatics negative in office today.      Testing Reviewed  1/31/24 labs: Glucose 88, , K4.2, BUN 12, creatinine 0.89, ALT 15, AST 14, troponin negative x 2, TSH 5.27, free T40.64, WBCs 5.1, Hgb 15, HCT 48.      PMH  1. Hypertension  2. Palpitations  3. Elevated resting heart rate  4. Dizziness  5. Not orthostatic  6. Hyperlipidemia  7. Hirsutism  8. Hypokalemia, consider hyperaldosteronism.  9. Unintentional weight loss  10. Systolic murmur left sternal border with increased gradient across the aortic valve/LVOT without significant aortic stenosis.  11. Cholelithiasis without cholecystitis-ultrasound August 2023, normal liver  12. Echocardiogram August 2023-LVEF 60 to 65% indeterminate diastolic function mild septal hypertrophy without evidence of significant outflow tract obstruction mild mitral insufficiency mild tricuspid insufficiency RVSP 40 mmHg calcification of aortic valvular tips significant gradient through the aortic valve peak 31 mean 20 left atrial diameter 3.1 left atrial volume  index 22, ascending aorta 3 cm, LV wall thickness 0.8 cm, dimensionless index of the aortic valve 0.49    I  have reviewed her PMH, ER visit, testing, labs and current medications with Dr. Morrison who recommends discontinuing Amlodipine and decreasing Aldactone dose 12.5 mg daily.  She will return in 2 weeks for re-evaluation.     Assessment/Plan  Lightheadedness:  Described a wooziness.  May be secondary to hypotension.  Hypotension:  112/70 sitting, 110/70 standing without medications this morning.  Medication changes as noted above.   Hypothyroidism: Follows with endocrinology  Follow in office in 2 weeks.       Patient Active Problem List   Diagnosis    Anxiety    Ductal carcinoma in situ (DCIS) of right breast    Essential hypertension    Hyperglycemia    Mixed hyperlipidemia    Class 1 obesity with body mass index (BMI) of 31.0 to 31.9 in adult    Hyperthyroidism    Gallstones    Weight loss    Tachycardia    Palpitations    Other microscopic hematuria    Numbness in left leg    Nausea in adult    Hip pain, left    Heart murmur, systolic    Fibroadenoma of right breast    Fatigue    Early satiety    Dizziness    Anosmia    Encounter to discuss test results    Abnormal thyroid blood test    Elevated alkaline phosphatase level    Graves' disease       Social History     Tobacco Use    Smoking status: Never     Passive exposure: Never    Smokeless tobacco: Never   Vaping Use    Vaping Use: Never used   Substance Use Topics    Alcohol use: Yes     Comment: socially drinks    Drug use: Never       Past Medical History:   Diagnosis Date    Abnormal mammogram 05/2021    cat 4 right breast-DCIS  (9/2020 left breast fibroadenoma)    Asthma     Heart murmur     History of mammogram 07/20/2023    cat 2 (bilateral diagnostic)    1/2020-cat 1         Current Outpatient Medications:     ALPRAZolam (Xanax) 0.25 mg tablet, Take 1 tablet (0.25 mg) by mouth 3 times a day as needed for anxiety., Disp: 21 tablet, Rfl: 0     "anastrozole (Arimidex) 1 mg tablet, Take 1 tablet (1 mg total) by mouth once daily., Disp: 30 tablet, Rfl: 3    atorvastatin (Lipitor) 40 mg tablet, TAKE 1 TABLET BY MOUTH EVERYDAY AT BEDTIME, Disp: 90 tablet, Rfl: 3    methIMAzole (Tapazole) 5 mg tablet, Take 0.5 tablets (2.5 mg) by mouth once every day., Disp: 45 tablet, Rfl: 2    cholecalciferol (Vitamin D-3) 50 mcg (2,000 unit) capsule, Take 1 capsule (50 mcg) by mouth once daily., Disp: , Rfl:     spironolactone (Aldactone) 25 mg tablet, Take 0.5 tablets (12.5 mg) by mouth once daily., Disp: 30 tablet, Rfl: 2    Codeine    Family History   Problem Relation Name Age of Onset    Breast cancer Mother Anh Khan     Cancer Mother Anh Khan     Other (intestine cancer) Maternal Grandmother Rosa Tipton     Cancer Maternal Grandmother Rosa Tipton     Heart disease Maternal Grandfather Hari Tipton     Kidney disease Sister Berna Meyers        Past Surgical History:   Procedure Laterality Date    BREAST BIOPSY Left 09/2020    fibroadenoma    BREAST BIOPSY Right 06/2021    ductal carcinoma in situ    COLONOSCOPY  01/2023    no polyps-due 2033    HYSTERECTOMY  1990          Review of systems  Constitutional: No weight loss, fever, chills, weakness or fatigue  HEENT: No visual loss, blurred vision, double vision or yellow sclerae  Skin: No rash or itching  Cardiovascular: No chest pain, pressure or discomfort, No palpitations or edema.  Respiratory: No shortness of breath, cough or sputum  Gastrointestinal: No nausea, vomiting or diarrhea. No bloody or dark tarry stools.  Neurological: Positive for lightheadedness. No headache,  syncope. No numbness or tingling in the extremities. No change in mood, affect, memory, metation.   Musculoskeletal: No muscle, back pain, joint pain or stiffness.  Hematologic: No anemia, bleeding or bruising.    /70 (BP Location: Left arm, Patient Position: Standing)   Pulse 89   Ht 1.549 m (5' 1\")   Wt 75.8 kg (167 lb)   LMP 01/01/1990 " (Approximate)   BMI 31.55 kg/m²     Patient Active Problem List   Diagnosis    Anxiety    Ductal carcinoma in situ (DCIS) of right breast    Essential hypertension    Hyperglycemia    Mixed hyperlipidemia    Class 1 obesity with body mass index (BMI) of 31.0 to 31.9 in adult    Hyperthyroidism    Gallstones    Weight loss    Tachycardia    Palpitations    Other microscopic hematuria    Numbness in left leg    Nausea in adult    Hip pain, left    Heart murmur, systolic    Fibroadenoma of right breast    Fatigue    Early satiety    Dizziness    Anosmia    Encounter to discuss test results    Abnormal thyroid blood test    Elevated alkaline phosphatase level    Graves' disease         Physical Exam  Constitutional: Well developed, awake/alert x 3, no distress.  Respiratory/Thorax: patent airways, CTAB, normal breath sounds with good expansion.  Cardiovascular: Regular rate and rhythm, no murmurs, normal S1 and S2,   Gastrointestinal: Non distended, soft, non-tender, no rebound tenderness or guarding.  Extremities: No cyanosis, edema.    Neurological: Alert and oriented x 3. Moves extremities spontaneous with purpose.  Psychological: Appropriate mood and behavior  Skin: Warm and Dry. No lesions or rashes.         Please excuse any errors in grammar or translation related to dictation, voice recognition software was used to prepare this document.

## 2024-02-07 NOTE — TELEPHONE ENCOUNTER
I spoke with Marisol and confirmed with her that her visit with Dr. John tomorrow 2/8/24 is a telehealth visit at 4:20 PM and that Dr. John will be calling her. She gave verbal understanding, was appreciative of the call and had no other questions at this time.

## 2024-02-08 ENCOUNTER — TELEMEDICINE (OUTPATIENT)
Dept: HEMATOLOGY/ONCOLOGY | Facility: CLINIC | Age: 69
End: 2024-02-08
Payer: MEDICARE

## 2024-02-08 DIAGNOSIS — D05.11 DUCTAL CARCINOMA IN SITU (DCIS) OF RIGHT BREAST: Primary | ICD-10-CM

## 2024-02-08 PROCEDURE — 1159F MED LIST DOCD IN RCRD: CPT | Performed by: INTERNAL MEDICINE

## 2024-02-08 PROCEDURE — 1036F TOBACCO NON-USER: CPT | Performed by: INTERNAL MEDICINE

## 2024-02-08 PROCEDURE — 99214 OFFICE O/P EST MOD 30 MIN: CPT | Performed by: INTERNAL MEDICINE

## 2024-02-08 PROCEDURE — 99214 OFFICE O/P EST MOD 30 MIN: CPT | Mod: G0 | Performed by: INTERNAL MEDICINE

## 2024-02-08 PROCEDURE — 1125F AMNT PAIN NOTED PAIN PRSNT: CPT | Performed by: INTERNAL MEDICINE

## 2024-02-08 RX ORDER — TAMOXIFEN CITRATE 20 MG/1
20 TABLET ORAL DAILY
Qty: 30 TABLET | Refills: 11 | Status: SHIPPED | OUTPATIENT
Start: 2024-02-08 | End: 2025-02-07

## 2024-02-09 ENCOUNTER — OFFICE VISIT (OUTPATIENT)
Dept: PRIMARY CARE | Facility: CLINIC | Age: 69
End: 2024-02-09
Payer: MEDICARE

## 2024-02-09 VITALS
HEIGHT: 61 IN | RESPIRATION RATE: 16 BRPM | BODY MASS INDEX: 31.91 KG/M2 | TEMPERATURE: 97 F | HEART RATE: 77 BPM | WEIGHT: 169 LBS | SYSTOLIC BLOOD PRESSURE: 104 MMHG | DIASTOLIC BLOOD PRESSURE: 62 MMHG | OXYGEN SATURATION: 99 %

## 2024-02-09 DIAGNOSIS — R00.0 TACHYCARDIA: ICD-10-CM

## 2024-02-09 DIAGNOSIS — R73.9 HYPERGLYCEMIA: ICD-10-CM

## 2024-02-09 DIAGNOSIS — E78.2 MIXED HYPERLIPIDEMIA: ICD-10-CM

## 2024-02-09 DIAGNOSIS — K80.20 GALLSTONES: ICD-10-CM

## 2024-02-09 DIAGNOSIS — E05.90 HYPERTHYROIDISM: ICD-10-CM

## 2024-02-09 DIAGNOSIS — I10 ESSENTIAL HYPERTENSION: Primary | ICD-10-CM

## 2024-02-09 DIAGNOSIS — E66.01 OBESITY, MORBID (MULTI): ICD-10-CM

## 2024-02-09 DIAGNOSIS — R00.2 PALPITATIONS: ICD-10-CM

## 2024-02-09 DIAGNOSIS — R01.1 HEART MURMUR, SYSTOLIC: ICD-10-CM

## 2024-02-09 PROCEDURE — 1125F AMNT PAIN NOTED PAIN PRSNT: CPT | Performed by: FAMILY MEDICINE

## 2024-02-09 PROCEDURE — 3078F DIAST BP <80 MM HG: CPT | Performed by: FAMILY MEDICINE

## 2024-02-09 PROCEDURE — 3074F SYST BP LT 130 MM HG: CPT | Performed by: FAMILY MEDICINE

## 2024-02-09 PROCEDURE — 1159F MED LIST DOCD IN RCRD: CPT | Performed by: FAMILY MEDICINE

## 2024-02-09 PROCEDURE — 99214 OFFICE O/P EST MOD 30 MIN: CPT | Performed by: FAMILY MEDICINE

## 2024-02-09 PROCEDURE — 1036F TOBACCO NON-USER: CPT | Performed by: FAMILY MEDICINE

## 2024-02-09 PROCEDURE — 1160F RVW MEDS BY RX/DR IN RCRD: CPT | Performed by: FAMILY MEDICINE

## 2024-02-09 NOTE — PROGRESS NOTES
"Subjective   Patient ID: Marisol Weiner is a 69 y.o. female who presents for ER Follow-up (Lightheadedness, SOB, tingling in hands and legs/Symptoms come and go/Saw Dr Prince Sanchez-she stopped norvasc, cut aldactone in half).  Covid vax: x 3  Flu: declined  Pneumo: UTD  RSV: advised  Shingles: advised     CRC: due 2033  Mammogram: 7/2023  Pap: hysterectomy  Lmp: hysterectomy  Saw dr prince Sanchez-dcd amlodipine    HPI  Patient Active Problem List   Diagnosis    Anxiety    Ductal carcinoma in situ (DCIS) of right breast    Essential hypertension    Hyperglycemia    Mixed hyperlipidemia    Obesity, morbid (CMS/HCC)    Hyperthyroidism    Gallstones    Weight loss    Tachycardia    Palpitations    Other microscopic hematuria    Numbness in left leg    Nausea in adult    Hip pain, left    Heart murmur, systolic    Fibroadenoma of right breast    Fatigue    Early satiety    Dizziness    Anosmia    Encounter to discuss test results    Abnormal thyroid blood test    Elevated alkaline phosphatase level    Graves' disease       Past Surgical History:   Procedure Laterality Date    BREAST BIOPSY Left 09/2020    fibroadenoma    BREAST BIOPSY Right 06/2021    ductal carcinoma in situ    COLONOSCOPY  01/2023    no polyps-due 2033    HYSTERECTOMY  1990       Review of Systems  This patient has  NO history of seizures/ CAD or CVA    NO history of recent Covid nor flu symptoms,  NO Fever nor chills,  NO Chest pain, shortness of breath nor paroxysmal nocturnal dyspnea,  NO Nausea, vomiting, nor diarrhea,  NO Hematochezia nor melena,  NO Dysuria, hematuria, nor new incontinence issues  NO new severe headaches nor neurological complaints,  NO new issues with anxiety nor depression nor new psychiatric complaints,  NO suicidal nor homicidal ideations.     OBJECTIVE:  /62   Pulse 77   Temp 36.1 °C (97 °F) (Temporal)   Resp 16   Ht 1.549 m (5' 1\")   Wt 76.7 kg (169 lb)   LMP 01/01/1990 (Approximate)   SpO2 99%   BMI 31.93 " kg/m²      General:  alert, oriented, no acute distress.  No obvious skin rashes noted.   No gait disturbance noted.    Mood is pleasant,  no signs of emotional distress.   Not appearing intoxicated or altered.   No voiced delusions,   Normal, appropriate behavior.    HEENT: Normocephalic, atraumatic,   Pupils round, reactive to light  Extraocular motions intact and wnl  Tympanic membranes normal    Neck: no nuchal rigidity  No masses palpable.  No carotid bruits.  No thyromegaly.    Respiratory: Equal breath sounds  No wheezes,    rales,    nor rhonchi  No respiratory distress.    Heart: Regular rate and rhythm, no loud   murmurs  no rubs/gallops    Abdomen: no masses palpable, nontender, no rebound nor guarding.    Extremities: NO cyanosis noted, no clubbing.   No edema noted.  2+dorsalis pedis pulses.    Normal-not antalgic, steady gait.    Admission on 01/31/2024, Discharged on 02/01/2024   Component Date Value Ref Range Status    Ventricular Rate 01/31/2024 71  BPM Final    Atrial Rate 01/31/2024 71  BPM Final    ND Interval 01/31/2024 176  ms Final    QRS Duration 01/31/2024 74  ms Final    QT Interval 01/31/2024 382  ms Final    QTC Calculation(Bazett) 01/31/2024 415  ms Final    P Axis 01/31/2024 62  degrees Final    R Axis 01/31/2024 24  degrees Final    T Axis 01/31/2024 52  degrees Final    QRS Count 01/31/2024 12  beats Final    Q Onset 01/31/2024 228  ms Final    P Onset 01/31/2024 140  ms Final    P Offset 01/31/2024 188  ms Final    T Offset 01/31/2024 419  ms Final    QTC Fredericia 01/31/2024 404  ms Final    WBC 01/31/2024 5.1  4.4 - 11.3 x10*3/uL Final    nRBC 01/31/2024 0.0  0.0 - 0.0 /100 WBCs Final    RBC 01/31/2024 5.59 (H)  4.00 - 5.20 x10*6/uL Final    Hemoglobin 01/31/2024 15.0  12.0 - 16.0 g/dL Final    Hematocrit 01/31/2024 48.3 (H)  36.0 - 46.0 % Final    MCV 01/31/2024 86  80 - 100 fL Final    MCH 01/31/2024 26.8  26.0 - 34.0 pg Final    MCHC 01/31/2024 31.1 (L)  32.0 - 36.0 g/dL Final     RDW 01/31/2024 14.2  11.5 - 14.5 % Final    Platelets 01/31/2024 269  150 - 450 x10*3/uL Final    Neutrophils % 01/31/2024 48.3  40.0 - 80.0 % Final    Immature Granulocytes %, Automated 01/31/2024 0.2  0.0 - 0.9 % Final    Immature Granulocyte Count (IG) includes promyelocytes, myelocytes and metamyelocytes but does not include bands. Percent differential counts (%) should be interpreted in the context of the absolute cell counts (cells/UL).    Lymphocytes % 01/31/2024 39.1  13.0 - 44.0 % Final    Monocytes % 01/31/2024 10.6  2.0 - 10.0 % Final    Eosinophils % 01/31/2024 1.2  0.0 - 6.0 % Final    Basophils % 01/31/2024 0.6  0.0 - 2.0 % Final    Neutrophils Absolute 01/31/2024 2.46  1.60 - 5.50 x10*3/uL Final    Percent differential counts (%) should be interpreted in the context of the absolute cell counts (cells/uL).    Immature Granulocytes Absolute, Au* 01/31/2024 0.01  0.00 - 0.50 x10*3/uL Final    Lymphocytes Absolute 01/31/2024 1.99  0.80 - 3.00 x10*3/uL Final    Monocytes Absolute 01/31/2024 0.54  0.05 - 0.80 x10*3/uL Final    Eosinophils Absolute 01/31/2024 0.06  0.00 - 0.40 x10*3/uL Final    Basophils Absolute 01/31/2024 0.03  0.00 - 0.10 x10*3/uL Final    Glucose 01/31/2024 88  74 - 99 mg/dL Final    Sodium 01/31/2024 139  136 - 145 mmol/L Final    Potassium 01/31/2024 4.2  3.5 - 5.3 mmol/L Final    Chloride 01/31/2024 102  98 - 107 mmol/L Final    Bicarbonate 01/31/2024 32  21 - 32 mmol/L Final    Anion Gap 01/31/2024 9 (L)  10 - 20 mmol/L Final    Urea Nitrogen 01/31/2024 12  6 - 23 mg/dL Final    Creatinine 01/31/2024 0.89  0.50 - 1.05 mg/dL Final    eGFR 01/31/2024 69  >60 mL/min/1.73m*2 Final    Calculations of estimated GFR are performed using the 2021 CKD-EPI Study Refit equation without the race variable for the IDMS-Traceable creatinine methods.  https://jasn.asnjournals.org/content/early/2021/09/22/ASN.1145546749    Calcium 01/31/2024 9.7  8.6 - 10.3 mg/dL Final    Albumin 01/31/2024 4.1   3.4 - 5.0 g/dL Final    Alkaline Phosphatase 01/31/2024 226 (H)  33 - 136 U/L Final    Total Protein 01/31/2024 7.9  6.4 - 8.2 g/dL Final    AST 01/31/2024 14  9 - 39 U/L Final    Bilirubin, Total 01/31/2024 0.5  0.0 - 1.2 mg/dL Final    ALT 01/31/2024 15  7 - 45 U/L Final    Patients treated with Sulfasalazine may generate falsely decreased results for ALT.    BNP 01/31/2024 16  0 - 99 pg/mL Final    Troponin I, High Sensitivity 01/31/2024 <3  0 - 13 ng/L Final    Troponin I, High Sensitivity 01/31/2024 <3  0 - 13 ng/L Final    Thyroid Stimulating Hormone 01/31/2024 5.27 (H)  0.44 - 3.98 mIU/L Final    D-Dimer Non VTE, Quant (ng/mL FEU) 01/31/2024 388  <=500 ng/mL FEU Final    Thyroxine, Free 01/31/2024 0.64  0.61 - 1.12 ng/dL Final    Flu A Result 01/31/2024 Not Detected  Not Detected Final    Flu B Result 01/31/2024 Not Detected  Not Detected Final    Coronavirus 2019, PCR 01/31/2024 Not Detected  Not Detected Final   Lab on 01/30/2024   Component Date Value Ref Range Status    Thyroid Stimulating Hormone 01/30/2024 5.79 (H)  0.44 - 3.98 mIU/L Final    Thyroxine, Free 01/30/2024 0.60 (L)  0.61 - 1.12 ng/dL Final    Triiodothyronine 01/30/2024 111  60 - 200 ng/dL Final    WBC 01/30/2024 4.6  4.4 - 11.3 x10*3/uL Final    nRBC 01/30/2024 0.0  0.0 - 0.0 /100 WBCs Final    RBC 01/30/2024 5.48 (H)  4.00 - 5.20 x10*6/uL Final    Hemoglobin 01/30/2024 15.1  12.0 - 16.0 g/dL Final    Hematocrit 01/30/2024 46.9 (H)  36.0 - 46.0 % Final    MCV 01/30/2024 86  80 - 100 fL Final    MCH 01/30/2024 27.6  26.0 - 34.0 pg Final    MCHC 01/30/2024 32.2  32.0 - 36.0 g/dL Final    RDW 01/30/2024 14.0  11.5 - 14.5 % Final    Platelets 01/30/2024 259  150 - 450 x10*3/uL Final    Neutrophils % 01/30/2024 41.0  40.0 - 80.0 % Final    Immature Granulocytes %, Automated 01/30/2024 0.2  0.0 - 0.9 % Final    Immature Granulocyte Count (IG) includes promyelocytes, myelocytes and metamyelocytes but does not include bands. Percent differential  counts (%) should be interpreted in the context of the absolute cell counts (cells/UL).    Lymphocytes % 01/30/2024 47.8  13.0 - 44.0 % Final    Monocytes % 01/30/2024 9.4  2.0 - 10.0 % Final    Eosinophils % 01/30/2024 0.9  0.0 - 6.0 % Final    Basophils % 01/30/2024 0.7  0.0 - 2.0 % Final    Neutrophils Absolute 01/30/2024 1.88  1.60 - 5.50 x10*3/uL Final    Percent differential counts (%) should be interpreted in the context of the absolute cell counts (cells/uL).    Immature Granulocytes Absolute, Au* 01/30/2024 0.01  0.00 - 0.50 x10*3/uL Final    Lymphocytes Absolute 01/30/2024 2.19  0.80 - 3.00 x10*3/uL Final    Monocytes Absolute 01/30/2024 0.43  0.05 - 0.80 x10*3/uL Final    Eosinophils Absolute 01/30/2024 0.04  0.00 - 0.40 x10*3/uL Final    Basophils Absolute 01/30/2024 0.03  0.00 - 0.10 x10*3/uL Final    Hemoglobin A1C 01/30/2024 5.1  see below % Final    Estimated Average Glucose 01/30/2024 100  Not Established mg/dL Final    Bone Specific Alkaline Phosphatase 01/30/2024 67.6  ug/L Final      INTERPRETIVE INFORMATION: Bone Specific Alkaline Phosphatase    Premenopausal Female:    4.5 - 16.9 ug/L    Postmenopausal Female:   7.0 - 22.4 ug/L  INTERPRETIVE INFORMATION: Bone Specific Alkaline Phosphatase  Liver alkaline phosphatase can affect the measurement of bone   specific alkaline phosphatase in this assay. Each 100 U/L of liver   alkaline phosphatase contributes an additional 2.5 to 5.8 ug/L to   the bone specific alkaline phosphatase result.  Performed By: [facility]  [address] [place] [address]  : Hema Mccray MD, PhD  CLIA Number: [id number]    GGT 01/30/2024 19  5 - 55 U/L Final   Lab on 01/03/2024   Component Date Value Ref Range Status    Thyroid Stimulating Hormone 01/03/2024 5.67 (H)  0.44 - 3.98 mIU/L Final    Thyroxine, Free 01/03/2024 0.52 (L)  0.61 - 1.12 ng/dL Final    Triiodothyronine 01/03/2024 103  60 - 200 ng/dL Final    Glucose 01/03/2024  85  74 - 99 mg/dL Final    Sodium 01/03/2024 139  136 - 145 mmol/L Final    Potassium 01/03/2024 4.3  3.5 - 5.3 mmol/L Final    Chloride 01/03/2024 103  98 - 107 mmol/L Final    Bicarbonate 01/03/2024 28  21 - 32 mmol/L Final    Anion Gap 01/03/2024 12  10 - 20 mmol/L Final    Urea Nitrogen 01/03/2024 13  6 - 23 mg/dL Final    Creatinine 01/03/2024 0.71  0.50 - 1.05 mg/dL Final    eGFR 01/03/2024 >90  >60 mL/min/1.73m*2 Final    Calculations of estimated GFR are performed using the 2021 CKD-EPI Study Refit equation without the race variable for the IDMS-Traceable creatinine methods.  https://jasn.asnjournals.org/content/early/2021/09/22/ASN.3622413728    Calcium 01/03/2024 9.3  8.6 - 10.3 mg/dL Final    Albumin 01/03/2024 4.1  3.4 - 5.0 g/dL Final    Alkaline Phosphatase 01/03/2024 257 (H)  33 - 136 U/L Final    Total Protein 01/03/2024 7.1  6.4 - 8.2 g/dL Final    AST 01/03/2024 13  9 - 39 U/L Final    Bilirubin, Total 01/03/2024 0.6  0.0 - 1.2 mg/dL Final    ALT 01/03/2024 16  7 - 45 U/L Final    Patients treated with Sulfasalazine may generate falsely decreased results for ALT.    WBC 01/03/2024 5.0  4.4 - 11.3 x10*3/uL Final    nRBC 01/03/2024 0.0  0.0 - 0.0 /100 WBCs Final    RBC 01/03/2024 5.29 (H)  4.00 - 5.20 x10*6/uL Final    Hemoglobin 01/03/2024 14.7  12.0 - 16.0 g/dL Final    Hematocrit 01/03/2024 45.6  36.0 - 46.0 % Final    MCV 01/03/2024 86  80 - 100 fL Final    MCH 01/03/2024 27.8  26.0 - 34.0 pg Final    MCHC 01/03/2024 32.2  32.0 - 36.0 g/dL Final    RDW 01/03/2024 15.7 (H)  11.5 - 14.5 % Final    Platelets 01/03/2024 273  150 - 450 x10*3/uL Final    Neutrophils % 01/03/2024 37.2  40.0 - 80.0 % Final    Immature Granulocytes %, Automated 01/03/2024 0.0  0.0 - 0.9 % Final    Immature Granulocyte Count (IG) includes promyelocytes, myelocytes and metamyelocytes but does not include bands. Percent differential counts (%) should be interpreted in the context of the absolute cell counts (cells/UL).     Lymphocytes % 01/03/2024 51.3  13.0 - 44.0 % Final    Monocytes % 01/03/2024 9.5  2.0 - 10.0 % Final    Eosinophils % 01/03/2024 1.2  0.0 - 6.0 % Final    Basophils % 01/03/2024 0.8  0.0 - 2.0 % Final    Neutrophils Absolute 01/03/2024 1.84  1.20 - 7.70 x10*3/uL Final    Percent differential counts (%) should be interpreted in the context of the absolute cell counts (cells/uL).    Immature Granulocytes Absolute, Au* 01/03/2024 0.00  0.00 - 0.70 x10*3/uL Final    Lymphocytes Absolute 01/03/2024 2.54  1.20 - 4.80 x10*3/uL Final    Monocytes Absolute 01/03/2024 0.47  0.10 - 1.00 x10*3/uL Final    Eosinophils Absolute 01/03/2024 0.06  0.00 - 0.70 x10*3/uL Final    Basophils Absolute 01/03/2024 0.04  0.00 - 0.10 x10*3/uL Final   Lab on 12/13/2023   Component Date Value Ref Range Status    Thyroid Stimulating Hormone 12/13/2023 44.93 (H)  0.44 - 3.98 mIU/L Final    Thyroxine, Free 12/13/2023 0.28 (L)  0.61 - 1.12 ng/dL Final    Triiodothyronine 12/13/2023 79  60 - 200 ng/dL Final   Lab on 11/17/2023   Component Date Value Ref Range Status    Thyroxine, Free 11/17/2023 0.34 (L)  0.61 - 1.12 ng/dL Final    Triiodothyronine 11/17/2023 68  60 - 200 ng/dL Final    Thyroid Stimulating Hormone 11/17/2023 1.60  0.44 - 3.98 mIU/L Final        Assessment/Plan     Problem List Items Addressed This Visit       Essential hypertension - Primary    Hyperglycemia    Mixed hyperlipidemia    Obesity, morbid (CMS/HCC)    Hyperthyroidism    Gallstones    Tachycardia    Palpitations    Heart murmur, systolic     Microvasc ischemia on ct  Baby asa rba addressed  Hold aldactone for low bps and dizzy to see if helpful  Seeing surgeon for GB  Sees heme -onc  Gallstones-saw surgeon, needs follow up  Will hold on osteoporosis tx for now for fear she has of side effects  3-4mo appt        Follow up at next scheduled visit 3mo

## 2024-02-09 NOTE — PROGRESS NOTES
Covid vax: x 3  Flu: declined  Pneumo: UTD  RSV: advised  Shingles: advised    CRC: due 2033  Mammogram: 7/2023  Pap: hysterectomy  Lmp: hysterectomy

## 2024-02-22 ENCOUNTER — OFFICE VISIT (OUTPATIENT)
Dept: SURGERY | Facility: CLINIC | Age: 69
End: 2024-02-22
Payer: MEDICARE

## 2024-02-22 VITALS
BODY MASS INDEX: 32.1 KG/M2 | DIASTOLIC BLOOD PRESSURE: 80 MMHG | SYSTOLIC BLOOD PRESSURE: 138 MMHG | WEIGHT: 170 LBS | HEIGHT: 61 IN

## 2024-02-22 DIAGNOSIS — K80.20 GALLSTONES: ICD-10-CM

## 2024-02-22 DIAGNOSIS — K82.8 BILIARY DYSKINESIA: Primary | ICD-10-CM

## 2024-02-22 PROCEDURE — 99213 OFFICE O/P EST LOW 20 MIN: CPT | Performed by: SURGERY

## 2024-02-22 PROCEDURE — 1159F MED LIST DOCD IN RCRD: CPT | Performed by: SURGERY

## 2024-02-22 PROCEDURE — 3075F SYST BP GE 130 - 139MM HG: CPT | Performed by: SURGERY

## 2024-02-22 PROCEDURE — 1160F RVW MEDS BY RX/DR IN RCRD: CPT | Performed by: SURGERY

## 2024-02-22 PROCEDURE — 1125F AMNT PAIN NOTED PAIN PRSNT: CPT | Performed by: SURGERY

## 2024-02-22 PROCEDURE — 3079F DIAST BP 80-89 MM HG: CPT | Performed by: SURGERY

## 2024-02-22 PROCEDURE — 1036F TOBACCO NON-USER: CPT | Performed by: SURGERY

## 2024-02-22 NOTE — LETTER
February 22, 2024     Mona Emanuel MD  [address] [facility] [address]    Patient: Marisol Weiner   YOB: 1955   Date of Visit: 2/22/2024       Dear Dr. Mona Emanuel MD:    Thank you for referring Marisol Weiner to me for evaluation. Below are my notes for this consultation.  If you have questions, please do not hesitate to call me. I look forward to following your patient along with you.       Sincerely,     Jv Madrigal MD      CC: No Recipients  ______________________________________________________________________________________    Subjective   Patient ID: Marisol Weiner is a 69 y.o. female who presents for No chief complaint on file..  HPI  69-year-old female patient who I saw September 2023 with HIDA scan showing gallbladder EF of 5%.  Surgery was placed on hold because soon after she was diagnosed with Graves' disease. She is on Methimazole. She went to the ER on January 31, 2024 due to shortness of breath and lightheadedness. CT head was negative; since stopping Aldactone, dizziness has resolved.  Denies chest pain, chest pressure, palpation,  abdominal pain, nausea or vomiting.      Objective   Physical Exam  Constitutional: no acute distress, well appearing and well nourished  Pulmonary: normal respiratory effort; clear to auscultation bilaterally, no wheezes or bronchi   Cardiovascular: regular rate and rhythm, no murmurs or extra-heart sounds; pedal pulses are normal  Abdomen: soft, non-tender, non-distended; no organomegaly   Musculoskeletal: digits and nails normal without clubbing or cyanosis; Joints, bones and muscles are normal with normal range of motion; muscle strength/tone is normal  Neurologic: cranial nerve II-XII intact grossly; normal gait  Psychiatric: oriented to person, place and time  Assessment/Plan   69-year-old patient with biliary dyskinesia and Graves' disease. She is currently asymptomatic from her gallbladder. I spoke with her  endocrinologist, Dr. Geno Nickerson.  He does not recommend surgery until the patient is euthyroid.  The patient has upcoming appointment on April 16.  If she is euthyroid at that time, I will proceed with a cholecystectomy.  This was discussed with patient and            Jv Madrigal MD 02/22/24 9:47 AM

## 2024-02-22 NOTE — PROGRESS NOTES
Subjective   Patient ID: Marisol eWiner is a 69 y.o. female who presents for No chief complaint on file..  HPI  69-year-old female patient who I saw September 2023 with HIDA scan showing gallbladder EF of 5%.  Surgery was placed on hold because soon after she was diagnosed with Graves' disease. She is on Methimazole. She went to the ER on January 31, 2024 due to shortness of breath and lightheadedness. CT head was negative; since stopping Aldactone, dizziness has resolved.  Denies chest pain, chest pressure, palpation,  abdominal pain, nausea or vomiting.      Objective   Physical Exam  Constitutional: no acute distress, well appearing and well nourished  Pulmonary: normal respiratory effort; clear to auscultation bilaterally, no wheezes or bronchi   Cardiovascular: regular rate and rhythm, no murmurs or extra-heart sounds; pedal pulses are normal  Abdomen: soft, non-tender, non-distended; no organomegaly   Musculoskeletal: digits and nails normal without clubbing or cyanosis; Joints, bones and muscles are normal with normal range of motion; muscle strength/tone is normal  Neurologic: cranial nerve II-XII intact grossly; normal gait  Psychiatric: oriented to person, place and time  Assessment/Plan   69-year-old patient with biliary dyskinesia and Graves' disease. She is currently asymptomatic from her gallbladder. I spoke with her endocrinologist, Dr. Geno Nickerson.  He does not recommend surgery until the patient is euthyroid.  The patient has upcoming appointment on April 16.  If she is euthyroid at that time, I will proceed with a cholecystectomy.  This was discussed with patient and            Jv Madrigal MD 02/22/24 9:47 AM

## 2024-02-26 ENCOUNTER — TELEMEDICINE (OUTPATIENT)
Dept: PRIMARY CARE | Facility: CLINIC | Age: 69
End: 2024-02-26
Payer: MEDICARE

## 2024-02-26 VITALS
WEIGHT: 169 LBS | SYSTOLIC BLOOD PRESSURE: 171 MMHG | DIASTOLIC BLOOD PRESSURE: 93 MMHG | BODY MASS INDEX: 31.91 KG/M2 | HEIGHT: 61 IN | HEART RATE: 80 BPM

## 2024-02-26 DIAGNOSIS — E66.01 OBESITY, MORBID (MULTI): ICD-10-CM

## 2024-02-26 DIAGNOSIS — E05.90 HYPERTHYROIDISM: ICD-10-CM

## 2024-02-26 DIAGNOSIS — F41.9 ANXIETY: Primary | ICD-10-CM

## 2024-02-26 DIAGNOSIS — I10 ESSENTIAL HYPERTENSION: ICD-10-CM

## 2024-02-26 DIAGNOSIS — R73.9 HYPERGLYCEMIA: ICD-10-CM

## 2024-02-26 PROCEDURE — 3080F DIAST BP >= 90 MM HG: CPT | Performed by: FAMILY MEDICINE

## 2024-02-26 PROCEDURE — 1159F MED LIST DOCD IN RCRD: CPT | Performed by: FAMILY MEDICINE

## 2024-02-26 PROCEDURE — 99213 OFFICE O/P EST LOW 20 MIN: CPT | Performed by: FAMILY MEDICINE

## 2024-02-26 PROCEDURE — 3077F SYST BP >= 140 MM HG: CPT | Performed by: FAMILY MEDICINE

## 2024-02-26 PROCEDURE — 1160F RVW MEDS BY RX/DR IN RCRD: CPT | Performed by: FAMILY MEDICINE

## 2024-02-26 PROCEDURE — 1036F TOBACCO NON-USER: CPT | Performed by: FAMILY MEDICINE

## 2024-02-26 PROCEDURE — 1125F AMNT PAIN NOTED PAIN PRSNT: CPT | Performed by: FAMILY MEDICINE

## 2024-02-26 RX ORDER — AMLODIPINE BESYLATE 5 MG/1
5 TABLET ORAL DAILY
COMMUNITY
End: 2024-03-26 | Stop reason: SDUPTHER

## 2024-02-26 ASSESSMENT — ANXIETY QUESTIONNAIRES
GAD7 TOTAL SCORE: 0
1. FEELING NERVOUS, ANXIOUS, OR ON EDGE: NOT AT ALL
4. TROUBLE RELAXING: NOT AT ALL
5. BEING SO RESTLESS THAT IT IS HARD TO SIT STILL: NOT AT ALL
2. NOT BEING ABLE TO STOP OR CONTROL WORRYING: NOT AT ALL
6. BECOMING EASILY ANNOYED OR IRRITABLE: NOT AT ALL
7. FEELING AFRAID AS IF SOMETHING AWFUL MIGHT HAPPEN: NOT AT ALL
IF YOU CHECKED OFF ANY PROBLEMS ON THIS QUESTIONNAIRE, HOW DIFFICULT HAVE THESE PROBLEMS MADE IT FOR YOU TO DO YOUR WORK, TAKE CARE OF THINGS AT HOME, OR GET ALONG WITH OTHER PEOPLE: NOT DIFFICULT AT ALL
3. WORRYING TOO MUCH ABOUT DIFFERENT THINGS: NOT AT ALL

## 2024-02-26 ASSESSMENT — ENCOUNTER SYMPTOMS
SHORTNESS OF BREATH: 0
SWEATS: 1
PALPITATIONS: 0
HYPERTENSION: 1
PND: 0
BLURRED VISION: 0
NECK PAIN: 0

## 2024-02-26 NOTE — PROGRESS NOTES
"Subjective   Patient ID: Marisol Weiner is a 69 y.o. female who presents and consented for VIRTUAL VISIT ---     VIDEO    C19: x3  Flu: Decline  Pneum: 2019  PAP: Hysterectomy   Mamm: DUE  ColoRect: UTD  PHQ 2: UTD  Some ?s and some anxiety    HPI  Patient Active Problem List   Diagnosis    Anxiety    Ductal carcinoma in situ (DCIS) of right breast    Essential hypertension    Hyperglycemia    Mixed hyperlipidemia    Hyperthyroidism    Gallstones    Weight loss    Tachycardia    Palpitations    Other microscopic hematuria    Numbness in left leg    Nausea in adult    Hip pain, left    Heart murmur, systolic    Fibroadenoma of right breast    Fatigue    Early satiety    Dizziness    Anosmia    Encounter to discuss test results    Abnormal thyroid blood test    Elevated alkaline phosphatase level    Graves' disease    Biliary dyskinesia       Past Surgical History:   Procedure Laterality Date    BREAST BIOPSY Left 09/2020    fibroadenoma    BREAST BIOPSY Right 06/2021    ductal carcinoma in situ    COLONOSCOPY  01/2023    no polyps-due 2033    HYSTERECTOMY  1990       Review of Systems  This patient has   NO history of recent Covid nor flu symptoms,      NO Fever nor chills,  NO Chest pain, shortness of breath nor paroxysmal nocturnal dyspnea,  NO Nausea, vomiting, nor diarrhea,  NO Hematochezia nor melena,  NO Dysuria, hematuria, nor new incontinence issues  NO new severe headaches nor neurological complaints,  NO new issues with anxiety nor depression nor new psychiatric complaints,  NO suicidal nor homicidal ideations.     OBJECTIVE:  BP (!) 171/93 (BP Location: Right arm, Patient Position: Sitting, BP Cuff Size: Adult)   Pulse 80   Ht 1.549 m (5' 1\")   Wt 76.7 kg (169 lb)   LMP 01/01/1990 (Approximate)   BMI 31.93 kg/m²      General:  alert, oriented, no acute distress.  Mood is pleasant, not tearful, no signs of emotional distress.  Not appearing intoxicated or altered.   No voiced delusions,   Normal, " appropriate behavior.    HEENT: Normocephalic, atraumatic,   Pupils round, reactive to light  Extraocular motions intact and wnl    Conversing sans difficulty does NOT appear to be short of breath-in severe pain or toxic appearing    Appears to be in baseline health      Admission on 01/31/2024, Discharged on 02/01/2024   Component Date Value Ref Range Status    Ventricular Rate 01/31/2024 71  BPM Final    Atrial Rate 01/31/2024 71  BPM Final    [place] Interval 01/31/2024 176  ms Final    QRS Duration 01/31/2024 74  ms Final    QT Interval 01/31/2024 382  ms Final    QTC Calculation(Bazett) 01/31/2024 415  ms Final    P Axis 01/31/2024 62  degrees Final    R Axis 01/31/2024 24  degrees Final    T Axis 01/31/2024 52  degrees Final    QRS Count 01/31/2024 12  beats Final    Q Onset 01/31/2024 228  ms Final    P Onset 01/31/2024 140  ms Final    P Offset 01/31/2024 188  ms Final    T Offset 01/31/2024 419  ms Final    QTC Fredericia 01/31/2024 404  ms Final    WBC 01/31/2024 5.1  4.4 - 11.3 x10*3/uL Final    nRBC 01/31/2024 0.0  0.0 - 0.0 /100 WBCs Final    RBC 01/31/2024 5.59 (H)  4.00 - 5.20 x10*6/uL Final    Hemoglobin 01/31/2024 15.0  12.0 - 16.0 g/dL Final    Hematocrit 01/31/2024 48.3 (H)  36.0 - 46.0 % Final    MCV 01/31/2024 86  80 - 100 fL Final    MCH 01/31/2024 26.8  26.0 - 34.0 pg Final    MCHC 01/31/2024 31.1 (L)  32.0 - 36.0 g/dL Final    RDW 01/31/2024 14.2  11.5 - 14.5 % Final    Platelets 01/31/2024 269  150 - 450 x10*3/uL Final    Neutrophils % 01/31/2024 48.3  40.0 - 80.0 % Final    Immature Granulocytes %, Automated 01/31/2024 0.2  0.0 - 0.9 % Final    Immature Granulocyte Count (IG) includes promyelocytes, myelocytes and metamyelocytes but does not include bands. Percent differential counts (%) should be interpreted in the context of the absolute cell counts (cells/UL).    Lymphocytes % 01/31/2024 39.1  13.0 - 44.0 % Final    Monocytes % 01/31/2024 10.6  2.0 - 10.0 % Final    Eosinophils % 01/31/2024  1.2  0.0 - 6.0 % Final    Basophils % 01/31/2024 0.6  0.0 - 2.0 % Final    Neutrophils Absolute 01/31/2024 2.46  1.60 - 5.50 x10*3/uL Final    Percent differential counts (%) should be interpreted in the context of the absolute cell counts (cells/uL).    Immature Granulocytes Absolute, Au* 01/31/2024 0.01  0.00 - 0.50 x10*3/uL Final    Lymphocytes Absolute 01/31/2024 1.99  0.80 - 3.00 x10*3/uL Final    Monocytes Absolute 01/31/2024 0.54  0.05 - 0.80 x10*3/uL Final    Eosinophils Absolute 01/31/2024 0.06  0.00 - 0.40 x10*3/uL Final    Basophils Absolute 01/31/2024 0.03  0.00 - 0.10 x10*3/uL Final    Glucose 01/31/2024 88  74 - 99 mg/dL Final    Sodium 01/31/2024 139  136 - 145 mmol/L Final    Potassium 01/31/2024 4.2  3.5 - 5.3 mmol/L Final    Chloride 01/31/2024 102  98 - 107 mmol/L Final    Bicarbonate 01/31/2024 32  21 - 32 mmol/L Final    Anion Gap 01/31/2024 9 (L)  10 - 20 mmol/L Final    Urea Nitrogen 01/31/2024 12  6 - 23 mg/dL Final    Creatinine 01/31/2024 0.89  0.50 - 1.05 mg/dL Final    eGFR 01/31/2024 69  >60 mL/min/1.73m*2 Final    Calculations of estimated GFR are performed using the 2021 CKD-EPI Study Refit equation without the race variable for the IDMS-Traceable creatinine methods.  https://jasn.asnjournals.org/content/early/2021/09/22/ASN.2071065747    Calcium 01/31/2024 9.7  8.6 - 10.3 mg/dL Final    Albumin 01/31/2024 4.1  3.4 - 5.0 g/dL Final    Alkaline Phosphatase 01/31/2024 226 (H)  33 - 136 U/L Final    Total Protein 01/31/2024 7.9  6.4 - 8.2 g/dL Final    AST 01/31/2024 14  9 - 39 U/L Final    Bilirubin, Total 01/31/2024 0.5  0.0 - 1.2 mg/dL Final    ALT 01/31/2024 15  7 - 45 U/L Final    Patients treated with Sulfasalazine may generate falsely decreased results for ALT.    BNP 01/31/2024 16  0 - 99 pg/mL Final    Troponin I, High Sensitivity 01/31/2024 <3  0 - 13 ng/L Final    Troponin I, High Sensitivity 01/31/2024 <3  0 - 13 ng/L Final    Thyroid Stimulating Hormone 01/31/2024 5.27 (H)   0.44 - 3.98 mIU/L Final    D-Dimer Non VTE, Quant (ng/mL FEU) 01/31/2024 388  <=500 ng/mL FEU Final    Thyroxine, Free 01/31/2024 0.64  0.61 - 1.12 ng/dL Final    Flu A Result 01/31/2024 Not Detected  Not Detected Final    Flu B Result 01/31/2024 Not Detected  Not Detected Final    Coronavirus 2019, PCR 01/31/2024 Not Detected  Not Detected Final   Lab on 01/30/2024   Component Date Value Ref Range Status    Thyroid Stimulating Hormone 01/30/2024 5.79 (H)  0.44 - 3.98 mIU/L Final    Thyroxine, Free 01/30/2024 0.60 (L)  0.61 - 1.12 ng/dL Final    Triiodothyronine 01/30/2024 111  60 - 200 ng/dL Final    WBC 01/30/2024 4.6  4.4 - 11.3 x10*3/uL Final    nRBC 01/30/2024 0.0  0.0 - 0.0 /100 WBCs Final    RBC 01/30/2024 5.48 (H)  4.00 - 5.20 x10*6/uL Final    Hemoglobin 01/30/2024 15.1  12.0 - 16.0 g/dL Final    Hematocrit 01/30/2024 46.9 (H)  36.0 - 46.0 % Final    MCV 01/30/2024 86  80 - 100 fL Final    MCH 01/30/2024 27.6  26.0 - 34.0 pg Final    MCHC 01/30/2024 32.2  32.0 - 36.0 g/dL Final    RDW 01/30/2024 14.0  11.5 - 14.5 % Final    Platelets 01/30/2024 259  150 - 450 x10*3/uL Final    Neutrophils % 01/30/2024 41.0  40.0 - 80.0 % Final    Immature Granulocytes %, Automated 01/30/2024 0.2  0.0 - 0.9 % Final    Immature Granulocyte Count (IG) includes promyelocytes, myelocytes and metamyelocytes but does not include bands. Percent differential counts (%) should be interpreted in the context of the absolute cell counts (cells/UL).    Lymphocytes % 01/30/2024 47.8  13.0 - 44.0 % Final    Monocytes % 01/30/2024 9.4  2.0 - 10.0 % Final    Eosinophils % 01/30/2024 0.9  0.0 - 6.0 % Final    Basophils % 01/30/2024 0.7  0.0 - 2.0 % Final    Neutrophils Absolute 01/30/2024 1.88  1.60 - 5.50 x10*3/uL Final    Percent differential counts (%) should be interpreted in the context of the absolute cell counts (cells/uL).    Immature Granulocytes Absolute, Au* 01/30/2024 0.01  0.00 - 0.50 x10*3/uL Final    Lymphocytes Absolute  01/30/2024 2.19  0.80 - 3.00 x10*3/uL Final    Monocytes Absolute 01/30/2024 0.43  0.05 - 0.80 x10*3/uL Final    Eosinophils Absolute 01/30/2024 0.04  0.00 - 0.40 x10*3/uL Final    Basophils Absolute 01/30/2024 0.03  0.00 - 0.10 x10*3/uL Final    Hemoglobin A1C 01/30/2024 5.1  see below % Final    Estimated Average Glucose 01/30/2024 100  Not Established mg/dL Final    Bone Specific Alkaline Phosphatase 01/30/2024 67.6  ug/L Final      INTERPRETIVE INFORMATION: Bone Specific Alkaline Phosphatase    Premenopausal Female:    4.5 - 16.9 ug/L    Postmenopausal Female:   7.0 - 22.4 ug/L  INTERPRETIVE INFORMATION: Bone Specific Alkaline Phosphatase  Liver alkaline phosphatase can affect the measurement of bone   specific alkaline phosphatase in this assay. Each 100 U/L of liver   alkaline phosphatase contributes an additional 2.5 to 5.8 ug/L to   the bone specific alkaline phosphatase result.  Performed By: [facility]  [address] [place] [address]  : Hema Mccray MD, PhD  CLIA Number: [id number]    GGT 01/30/2024 19  5 - 55 U/L Final   Lab on 01/03/2024   Component Date Value Ref Range Status    Thyroid Stimulating Hormone 01/03/2024 5.67 (H)  0.44 - 3.98 mIU/L Final    Thyroxine, Free 01/03/2024 0.52 (L)  0.61 - 1.12 ng/dL Final    Triiodothyronine 01/03/2024 103  60 - 200 ng/dL Final    Glucose 01/03/2024 85  74 - 99 mg/dL Final    Sodium 01/03/2024 139  136 - 145 mmol/L Final    Potassium 01/03/2024 4.3  3.5 - 5.3 mmol/L Final    Chloride 01/03/2024 103  98 - 107 mmol/L Final    Bicarbonate 01/03/2024 28  21 - 32 mmol/L Final    Anion Gap 01/03/2024 12  10 - 20 mmol/L Final    Urea Nitrogen 01/03/2024 13  6 - 23 mg/dL Final    Creatinine 01/03/2024 0.71  0.50 - 1.05 mg/dL Final    eGFR 01/03/2024 >90  >60 mL/min/1.73m*2 Final    Calculations of estimated GFR are performed using the 2021 CKD-EPI Study Refit equation without the race variable for the IDMS-Traceable creatinine  methods.  https://jasn.asnjournals.org/content/early/2021/09/22/ASN.9708151982    Calcium 01/03/2024 9.3  8.6 - 10.3 mg/dL Final    Albumin 01/03/2024 4.1  3.4 - 5.0 g/dL Final    Alkaline Phosphatase 01/03/2024 257 (H)  33 - 136 U/L Final    Total Protein 01/03/2024 7.1  6.4 - 8.2 g/dL Final    AST 01/03/2024 13  9 - 39 U/L Final    Bilirubin, Total 01/03/2024 0.6  0.0 - 1.2 mg/dL Final    ALT 01/03/2024 16  7 - 45 U/L Final    Patients treated with Sulfasalazine may generate falsely decreased results for ALT.    WBC 01/03/2024 5.0  4.4 - 11.3 x10*3/uL Final    nRBC 01/03/2024 0.0  0.0 - 0.0 /100 WBCs Final    RBC 01/03/2024 5.29 (H)  4.00 - 5.20 x10*6/uL Final    Hemoglobin 01/03/2024 14.7  12.0 - 16.0 g/dL Final    Hematocrit 01/03/2024 45.6  36.0 - 46.0 % Final    MCV 01/03/2024 86  80 - 100 fL Final    MCH 01/03/2024 27.8  26.0 - 34.0 pg Final    MCHC 01/03/2024 32.2  32.0 - 36.0 g/dL Final    RDW 01/03/2024 15.7 (H)  11.5 - 14.5 % Final    Platelets 01/03/2024 273  150 - 450 x10*3/uL Final    Neutrophils % 01/03/2024 37.2  40.0 - 80.0 % Final    Immature Granulocytes %, Automated 01/03/2024 0.0  0.0 - 0.9 % Final    Immature Granulocyte Count (IG) includes promyelocytes, myelocytes and metamyelocytes but does not include bands. Percent differential counts (%) should be interpreted in the context of the absolute cell counts (cells/UL).    Lymphocytes % 01/03/2024 51.3  13.0 - 44.0 % Final    Monocytes % 01/03/2024 9.5  2.0 - 10.0 % Final    Eosinophils % 01/03/2024 1.2  0.0 - 6.0 % Final    Basophils % 01/03/2024 0.8  0.0 - 2.0 % Final    Neutrophils Absolute 01/03/2024 1.84  1.20 - 7.70 x10*3/uL Final    Percent differential counts (%) should be interpreted in the context of the absolute cell counts (cells/uL).    Immature Granulocytes Absolute, Au* 01/03/2024 0.00  0.00 - 0.70 x10*3/uL Final    Lymphocytes Absolute 01/03/2024 2.54  1.20 - 4.80 x10*3/uL Final    Monocytes Absolute 01/03/2024 0.47  0.10 - 1.00  x10*3/uL Final    Eosinophils Absolute 01/03/2024 0.06  0.00 - 0.70 x10*3/uL Final    Basophils Absolute 01/03/2024 0.04  0.00 - 0.10 x10*3/uL Final   Lab on 12/13/2023   Component Date Value Ref Range Status    Thyroid Stimulating Hormone 12/13/2023 44.93 (H)  0.44 - 3.98 mIU/L Final    Thyroxine, Free 12/13/2023 0.28 (L)  0.61 - 1.12 ng/dL Final    Triiodothyronine 12/13/2023 79  60 - 200 ng/dL Final        Assessment/Plan     Problem List Items Addressed This Visit       Anxiety - Primary    Essential hypertension    Hyperglycemia    RESOLVED: Obesity, morbid (CMS/HCC)    Hyperthyroidism       Patient is aware of LIMITATIONS of VIRTUAL VISITS and how-of course-an IN PERSON VISIT IS always ideal or preferred. IF condition deteriorates from here-ER IS DEFINITELY ADVISED.  She took herself off amlodipine and then over wkd started 2.5mg  146/90 over wkd  146/80  Just now up to 171/93  Will up norvasc to 5mg  Signs./sxs necessitating er d/w pt  If HA or cp or spiked BP-needs er  (Consistently 160/100 or higher)  Discussed side effects of most meds she is taking  Will add aldactone every other day if needed 12.5 (140>/>90)  See me 3-6mo

## 2024-02-27 ENCOUNTER — LAB (OUTPATIENT)
Dept: LAB | Facility: LAB | Age: 69
End: 2024-02-27
Payer: MEDICARE

## 2024-02-27 DIAGNOSIS — E05.00 GRAVES' DISEASE: ICD-10-CM

## 2024-02-27 LAB
T3 SERPL-MCNC: 108 NG/DL (ref 60–200)
T4 FREE SERPL-MCNC: 0.86 NG/DL (ref 0.61–1.12)
TSH SERPL-ACNC: 3.39 MIU/L (ref 0.44–3.98)

## 2024-02-27 PROCEDURE — 36415 COLL VENOUS BLD VENIPUNCTURE: CPT

## 2024-02-27 PROCEDURE — 84480 ASSAY TRIIODOTHYRONINE (T3): CPT

## 2024-02-27 PROCEDURE — 84439 ASSAY OF FREE THYROXINE: CPT

## 2024-02-27 PROCEDURE — 84445 ASSAY OF TSI GLOBULIN: CPT

## 2024-02-27 PROCEDURE — 84443 ASSAY THYROID STIM HORMONE: CPT

## 2024-03-01 LAB — TSI SER-ACNC: 3.4 TSI INDEX

## 2024-03-04 DIAGNOSIS — E05.00 GRAVES' DISEASE: Primary | ICD-10-CM

## 2024-03-05 NOTE — PROGRESS NOTES
Patient completed follow-up TFTs s/p decreasing methimazole from 2.5mg daily to 2.5mg 5x/week on 2/6.    -TSH 3.39 (from 5.27 on 1/31)  -FT4 0.86 (from 0.64 on 1/31)  -T3 108   -TSI 3.4 (last checked 10/2023, was 5.9)     Sent [employer] message instructing patient to continue the same dose of methimazole for now. She has follow-up with Dr. Nickerson on 4/16, advised to repeat labs again prior to that appointment.    Plan discussed with Dr. Nickerson.

## 2024-03-21 ENCOUNTER — APPOINTMENT (OUTPATIENT)
Dept: CARDIOLOGY | Facility: CLINIC | Age: 69
End: 2024-03-21
Payer: MEDICARE

## 2024-03-26 ENCOUNTER — OFFICE VISIT (OUTPATIENT)
Dept: CARDIOLOGY | Facility: CLINIC | Age: 69
End: 2024-03-26
Payer: MEDICARE

## 2024-03-26 VITALS
SYSTOLIC BLOOD PRESSURE: 126 MMHG | DIASTOLIC BLOOD PRESSURE: 80 MMHG | WEIGHT: 172.6 LBS | HEIGHT: 61 IN | BODY MASS INDEX: 32.59 KG/M2 | HEART RATE: 78 BPM

## 2024-03-26 DIAGNOSIS — E05.90 HYPERTHYROIDISM: ICD-10-CM

## 2024-03-26 DIAGNOSIS — R06.02 SHORTNESS OF BREATH: ICD-10-CM

## 2024-03-26 DIAGNOSIS — I10 PRIMARY HYPERTENSION: ICD-10-CM

## 2024-03-26 DIAGNOSIS — I27.20 PULMONARY HYPERTENSION (MULTI): ICD-10-CM

## 2024-03-26 DIAGNOSIS — E78.2 MIXED HYPERLIPIDEMIA: ICD-10-CM

## 2024-03-26 DIAGNOSIS — R01.1 HEART MURMUR, SYSTOLIC: ICD-10-CM

## 2024-03-26 PROBLEM — I95.1 ORTHOSTATIC HYPOTENSION: Status: ACTIVE | Noted: 2024-03-26

## 2024-03-26 PROCEDURE — 1160F RVW MEDS BY RX/DR IN RCRD: CPT | Performed by: INTERNAL MEDICINE

## 2024-03-26 PROCEDURE — 3074F SYST BP LT 130 MM HG: CPT | Performed by: INTERNAL MEDICINE

## 2024-03-26 PROCEDURE — 3079F DIAST BP 80-89 MM HG: CPT | Performed by: INTERNAL MEDICINE

## 2024-03-26 PROCEDURE — 1159F MED LIST DOCD IN RCRD: CPT | Performed by: INTERNAL MEDICINE

## 2024-03-26 PROCEDURE — 1036F TOBACCO NON-USER: CPT | Performed by: INTERNAL MEDICINE

## 2024-03-26 PROCEDURE — 99214 OFFICE O/P EST MOD 30 MIN: CPT | Performed by: INTERNAL MEDICINE

## 2024-03-26 RX ORDER — AMLODIPINE BESYLATE 5 MG/1
5 TABLET ORAL DAILY
Qty: 90 TABLET | Refills: 3 | Status: SHIPPED | OUTPATIENT
Start: 2024-03-26

## 2024-03-26 NOTE — PATIENT INSTRUCTIONS
1 year follow up with Dr. Prince Newman to be done prior    -Labs to be done prior to next appointment   These labs should be fasting.      -Please bring all medicines, vitamins, and herbal supplements with you in original bottles to every appointment!!!!    -Prescriptions will not be filled unless you are compliant with your follow up appointments or have a follow up appointment scheduled as per instruction of your physician. Refills should be requested at the time of your visit.

## 2024-03-26 NOTE — PROGRESS NOTES
Patient was most recently seen by me in November 2023 and thereafter seen by Liliya Velasco in February 2024 and I have reviewed Liliya's notes.  In January she presented to the emergency department in [place] with shortness of breath and lightheadedness.  CT of the head was negative laboratory data unremarkable.  In consultation with myself, amlodipine was discontinued and Aldactone was reduced to 12.5 mg p.o. daily.    Subjective :     Patient reports that her blood pressure went up considerably after she down titrated her blood pressure medications and blood pressure was around 100-1 10 systolic she did not feel well and was dizzy all the time.  When blood pressure increased to 180 systolic, at the recommendation of her  she resumed amlodipine 2.5 mg daily, subsequently Dr. Emanuel increased this to 5 mg daily.  She continued to feel dizzy at times, and stopped the spironolactone she says that she feels great now, she feels like her old self, no longer dizzy.  She showed me several blood pressure readings which are in the 130s systolic.  She is currently off the spironolactone      History so Far :    1. Hypertension  2. Palpitations  3. Elevated resting heart rate  4. Dizziness  5. Not orthostatic  6. Hyperlipidemia  7. Hirsutism  8. Hypokalemia, consider hyperaldosteronism.  9. Unintentional weight loss  10. Systolic murmur left sternal border with increased gradient across the aortic valve/LVOT without significant aortic stenosis.  11. Cholelithiasis without cholecystitis-ultrasound August 2023, normal liver  12. Echocardiogram August 2023-LVEF 60 to 65% indeterminate diastolic function mild septal hypertrophy without evidence of significant outflow tract obstruction mild mitral insufficiency mild tricuspid insufficiency RVSP 40 mmHg calcification of aortic valvular tips significant gradient through the aortic valve peak 31 mean 20 left atrial diameter 3.1 left atrial volume index 22, ascending  "aorta 3 cm, LV wall thickness 0.8 cm, dimensionless index of the aortic valve 0.49  13.  Labile hypertension, whitecoat hypertension, orthostatic hypotension  Objective   Failed to redirect to the Timeline version of the REVFS SmartLink.   Wt Readings from Last 3 Encounters:   03/26/24 78.3 kg (172 lb 9.6 oz)   02/26/24 76.7 kg (169 lb)   02/22/24 77.1 kg (170 lb)        Visit Vitals  /80 (BP Location: Left arm, Patient Position: Standing) Comment: bp rechecked standing per Dr. Morrison   Pulse 78   Ht 1.549 m (5' 1\")   Wt 78.3 kg (172 lb 9.6 oz)   LMP 01/01/1990 (Approximate)   BMI 32.61 kg/m²   OB Status Hysterectomy   Smoking Status Never   BSA 1.84 m²            Physical Exam:    GENERAL APPEARANCE: in no acute distress.  CHEST: Symmetric and non-tender.  INTEGUMENT: Skin warm and dry  HEENT: No gross abnormalities identified.No pallor or scleral icterus.  NECK: Supple, no JVD, no bruit.   NEURO/PSHCY: Alert and oriented x3; appropriate behavior and responses and responses  LUNGS: Clear to auscultation bilaterally; normal respiratory effort.  HEART: Rate and rhythm regular with no evident murmur; grade 2/6 crescendo decrescendo murmur is heard along the left sternal border unchanged from prior examination no gallop appreciated.   ABDOMEN: Soft, non tender.  MUSCULOSKELETAL: No gross deformities.  EXTREMITIES: Warm  There is no edema noted.    Meds:  Current Outpatient Medications   Medication Instructions    ALPRAZolam (XANAX) 0.25 mg, oral, 3 times daily PRN    amLODIPine (NORVASC) 5 mg, oral, Daily    atorvastatin (LIPITOR) 40 mg, oral, Nightly    cholecalciferol (Vitamin D-3) 50 mcg (2,000 unit) capsule 1 capsule, oral, Daily    methIMAzole (TAPAZOLE) 2.5 mg, oral, Every Day    tamoxifen (NOLVADEX) 20 mg, oral, Daily, Take with water or any other nonalcoholic drink with or without food at around the same time(s) every day.          Allergies   Allergen Reactions    Codeine Other     uncontrollable " giddiness    Spironolactone Dizziness             LABS:    Lab Results   Component Value Date    WBC 5.1 01/31/2024    HGB 15.0 01/31/2024    HCT 48.3 (H) 01/31/2024     01/31/2024    CHOL 130 08/03/2023    TRIG 74 08/03/2023    HDL 50.6 08/03/2023    ALT 15 01/31/2024    AST 14 01/31/2024     01/31/2024    K 4.2 01/31/2024     01/31/2024    CREATININE 0.89 01/31/2024    BUN 12 01/31/2024    CO2 32 01/31/2024    TSH 3.39 02/27/2024    HGBA1C 5.1 01/30/2024                       Patient Active Problem List    Diagnosis Date Noted    Biliary dyskinesia 02/22/2024    Graves' disease 02/02/2024    Encounter to discuss test results 11/08/2023    Abnormal thyroid blood test 11/08/2023    Elevated alkaline phosphatase level 11/08/2023    Tachycardia 09/30/2023    Palpitations 09/30/2023    Other microscopic hematuria 09/30/2023    Numbness in left leg 09/30/2023    Nausea in adult 09/30/2023    Hip pain, left 09/30/2023    Heart murmur, systolic 09/30/2023    Fibroadenoma of right breast 09/30/2023    Fatigue 09/30/2023    Early satiety 09/30/2023    Dizziness 09/30/2023    Anosmia 09/30/2023    Weight loss 09/28/2023    Hyperthyroidism 09/20/2023    Gallstones 09/20/2023    Anxiety 07/20/2023    Ductal carcinoma in situ (DCIS) of right breast 07/20/2023    Essential hypertension 07/20/2023    Hyperglycemia 07/20/2023    Mixed hyperlipidemia 07/20/2023                 Assessment:    1. Primary hypertension  Follow Up In Cardiology    Follow Up In Cardiology    Comprehensive Metabolic Panel    Lipid Panel    Thyroxine, Free    Thyroid Stimulating Hormone    amLODIPine (Norvasc) 5 mg tablet      2. Mixed hyperlipidemia  Follow Up In Cardiology    Follow Up In Cardiology    Comprehensive Metabolic Panel    Lipid Panel    Thyroxine, Free    Thyroid Stimulating Hormone      3. Heart murmur, systolic        4. Hyperthyroidism  Follow Up In Cardiology    Comprehensive Metabolic Panel    Lipid Panel     Thyroxine, Free    Thyroid Stimulating Hormone         Clinical decision making:  This orthostatic hypotension, blood pressure medications will be titrated to upright blood pressure  Patient can stay off the spironolactone she reports that it made her dizzy.  She feels great on her current regimen, thyroid issues are followed by endocrinology, and I have reviewed blood work to include T4 TSH lipid profile and comprehensive profile and hemoglobin A1c  Her systolic murmur is consistent with mild calcific aortic stenosis  She has mild pulmonary hypertension-exact etiology unclear, could be diastolic heart failure, could be undiagnosed sleep apnea.    Follow up : 12 months.    Comprehensive profile and lipid profile, free T4 and TSH prior to next visit  Orthostatics at every visit  Could consider switching to angiotensin receptor blocker for blood pressure management.  She feels so well on her current regimen but I was hesitant to make changes today.  Workup for sleep apnea will be deferred to Dr. Emanuel, I think it is more likely that she has diastolic dysfunction causing pulmonary hypertension.  Echocardiogram prior to next visit    Provider Attestation - Scribe documentation    All medical record entries made by the Scribe were at my direction and personally dictated by me. I have reviewed the chart and agree that the record accurately reflects my personal performance of the history, physical exam, discussion and plan.       Scribe Attestation  By signing my name below, I, Odette Morrison MD , Scribe   attest that this documentation has been prepared under the direction and in the presence of Odette Morrison MD.

## 2024-04-04 ENCOUNTER — LAB (OUTPATIENT)
Dept: LAB | Facility: LAB | Age: 69
End: 2024-04-04
Payer: MEDICARE

## 2024-04-04 DIAGNOSIS — E05.00 GRAVES' DISEASE: ICD-10-CM

## 2024-04-04 LAB
T4 FREE SERPL-MCNC: 0.55 NG/DL (ref 0.61–1.12)
TSH SERPL-ACNC: 2.4 MIU/L (ref 0.44–3.98)

## 2024-04-04 PROCEDURE — 84439 ASSAY OF FREE THYROXINE: CPT

## 2024-04-04 PROCEDURE — 84443 ASSAY THYROID STIM HORMONE: CPT

## 2024-04-04 PROCEDURE — 84480 ASSAY TRIIODOTHYRONINE (T3): CPT

## 2024-04-04 PROCEDURE — 36415 COLL VENOUS BLD VENIPUNCTURE: CPT

## 2024-04-05 LAB — T3 SERPL-MCNC: 99 NG/DL (ref 60–200)

## 2024-04-16 ENCOUNTER — OFFICE VISIT (OUTPATIENT)
Dept: ENDOCRINOLOGY | Facility: CLINIC | Age: 69
End: 2024-04-16
Payer: MEDICARE

## 2024-04-16 VITALS
HEART RATE: 76 BPM | HEIGHT: 61 IN | BODY MASS INDEX: 32.66 KG/M2 | SYSTOLIC BLOOD PRESSURE: 123 MMHG | WEIGHT: 173 LBS | DIASTOLIC BLOOD PRESSURE: 81 MMHG

## 2024-04-16 DIAGNOSIS — E05.00 GRAVES DISEASE: ICD-10-CM

## 2024-04-16 DIAGNOSIS — E05.90 HYPERTHYROIDISM: Primary | ICD-10-CM

## 2024-04-16 PROCEDURE — 3074F SYST BP LT 130 MM HG: CPT | Performed by: INTERNAL MEDICINE

## 2024-04-16 PROCEDURE — 1036F TOBACCO NON-USER: CPT | Performed by: INTERNAL MEDICINE

## 2024-04-16 PROCEDURE — 1159F MED LIST DOCD IN RCRD: CPT | Performed by: INTERNAL MEDICINE

## 2024-04-16 PROCEDURE — 99214 OFFICE O/P EST MOD 30 MIN: CPT | Performed by: INTERNAL MEDICINE

## 2024-04-16 PROCEDURE — 1160F RVW MEDS BY RX/DR IN RCRD: CPT | Performed by: INTERNAL MEDICINE

## 2024-04-16 PROCEDURE — 3079F DIAST BP 80-89 MM HG: CPT | Performed by: INTERNAL MEDICINE

## 2024-04-16 NOTE — PROGRESS NOTES
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 We saw her in Oct 2023 and diagnosed her with Grave's disease and significant hyperthyroid Sx. We started her on MMI and the dose was lowered gradually to 2.5 5 mg 5 days/week for the past month.     Interval HX: improved in all sx of hyperthyroidism; less bowel movement; no palpitations; gained weight; no sweat or hot flashes; eyes less teary      No new meds otherwise   ROS : Otherwise negative      PE:  GEN: NAD, AAOX3  HEENT : No noted exophthalmos, lid retraction, or lid lag. No chemosis or tearing.   NECK: horizontal scar noted over anterior neck, less palpable thyroid tissue on the R, thick isthmus and nodular thyroid noted on the L  CV:S1.S2,RRR  Neuro : no FND, minimal delay in DTR, no  tremor on outstretched hands  LE: no peripheral edema; no myopathy  Skin: warm and dry.   Psych: Appropriate mood and behavior       Impression/ Plan     Doing well; early hypothyroid sx; will lower the dose of MMI  further to 2.5 mg 4 days/ week.  Labs in 6 weeks; return in 3 months with labs then also.    Discuss long term management ; favor continuing MMI; would not use NI in light of the minimal eye sx. OK to proceed with gall bladder surgery if needed.     Geno Nickerson MD

## 2024-05-08 ENCOUNTER — APPOINTMENT (OUTPATIENT)
Dept: CARDIOLOGY | Facility: CLINIC | Age: 69
End: 2024-05-08
Payer: MEDICARE

## 2024-05-13 ENCOUNTER — LAB (OUTPATIENT)
Dept: LAB | Facility: LAB | Age: 69
End: 2024-05-13
Payer: MEDICARE

## 2024-05-13 LAB
ALBUMIN SERPL BCP-MCNC: 4 G/DL (ref 3.4–5)
ALP SERPL-CCNC: 111 U/L (ref 33–136)
ALT SERPL W P-5'-P-CCNC: 13 U/L (ref 7–45)
ANION GAP SERPL CALC-SCNC: 11 MMOL/L (ref 10–20)
AST SERPL W P-5'-P-CCNC: 16 U/L (ref 9–39)
BILIRUB SERPL-MCNC: 0.5 MG/DL (ref 0–1.2)
BUN SERPL-MCNC: 16 MG/DL (ref 6–23)
CALCIUM SERPL-MCNC: 9.2 MG/DL (ref 8.6–10.3)
CHLORIDE SERPL-SCNC: 105 MMOL/L (ref 98–107)
CO2 SERPL-SCNC: 29 MMOL/L (ref 21–32)
CREAT SERPL-MCNC: 0.63 MG/DL (ref 0.5–1.05)
EGFRCR SERPLBLD CKD-EPI 2021: >90 ML/MIN/1.73M*2
GLUCOSE SERPL-MCNC: 86 MG/DL (ref 74–99)
POTASSIUM SERPL-SCNC: 4.1 MMOL/L (ref 3.5–5.3)
PROT SERPL-MCNC: 6.8 G/DL (ref 6.4–8.2)
SODIUM SERPL-SCNC: 141 MMOL/L (ref 136–145)

## 2024-05-13 PROCEDURE — 80053 COMPREHEN METABOLIC PANEL: CPT

## 2024-05-14 ENCOUNTER — OFFICE VISIT (OUTPATIENT)
Dept: PRIMARY CARE | Facility: CLINIC | Age: 69
End: 2024-05-14
Payer: MEDICARE

## 2024-05-14 VITALS
WEIGHT: 179 LBS | DIASTOLIC BLOOD PRESSURE: 88 MMHG | BODY MASS INDEX: 33.79 KG/M2 | OXYGEN SATURATION: 98 % | HEIGHT: 61 IN | TEMPERATURE: 97.1 F | RESPIRATION RATE: 16 BRPM | HEART RATE: 84 BPM | SYSTOLIC BLOOD PRESSURE: 130 MMHG

## 2024-05-14 DIAGNOSIS — E78.2 MIXED HYPERLIPIDEMIA: ICD-10-CM

## 2024-05-14 DIAGNOSIS — F41.9 ANXIETY: ICD-10-CM

## 2024-05-14 DIAGNOSIS — E05.00 GRAVES' DISEASE: ICD-10-CM

## 2024-05-14 DIAGNOSIS — I10 ESSENTIAL HYPERTENSION: ICD-10-CM

## 2024-05-14 DIAGNOSIS — R73.9 HYPERGLYCEMIA: ICD-10-CM

## 2024-05-14 DIAGNOSIS — E05.90 HYPERTHYROIDISM: ICD-10-CM

## 2024-05-14 DIAGNOSIS — Z12.31 ENCOUNTER FOR SCREENING MAMMOGRAM FOR MALIGNANT NEOPLASM OF BREAST: ICD-10-CM

## 2024-05-14 DIAGNOSIS — D05.11 DUCTAL CARCINOMA IN SITU (DCIS) OF RIGHT BREAST: ICD-10-CM

## 2024-05-14 PROCEDURE — 99214 OFFICE O/P EST MOD 30 MIN: CPT | Performed by: FAMILY MEDICINE

## 2024-05-14 PROCEDURE — 3075F SYST BP GE 130 - 139MM HG: CPT | Performed by: FAMILY MEDICINE

## 2024-05-14 PROCEDURE — 1159F MED LIST DOCD IN RCRD: CPT | Performed by: FAMILY MEDICINE

## 2024-05-14 PROCEDURE — 1036F TOBACCO NON-USER: CPT | Performed by: FAMILY MEDICINE

## 2024-05-14 PROCEDURE — 1160F RVW MEDS BY RX/DR IN RCRD: CPT | Performed by: FAMILY MEDICINE

## 2024-05-14 PROCEDURE — 3079F DIAST BP 80-89 MM HG: CPT | Performed by: FAMILY MEDICINE

## 2024-05-14 NOTE — PROGRESS NOTES
"Subjective   Patient ID: Marisol Weiner is a 69 y.o. female who presents for Hyperglycemia, Hypertension, Hyperlipidemia, and Hyperthyroidism.  Covid vax: x 3  Flu: n/a  Pneumo: UTD  RSV: advised  Shingles: advised     CRC: due 2033  Mammogram: 7/2023-scheduled  Pap: hysterectomy  Lmp: hysterectomy  Sees endo q3mo    HPI  Patient Active Problem List   Diagnosis    Anxiety    Ductal carcinoma in situ (DCIS) of right breast    Essential hypertension    Hyperglycemia    Mixed hyperlipidemia    Hyperthyroidism    Gallstones    Weight loss    Tachycardia    Palpitations    Other microscopic hematuria    Numbness in left leg    Nausea in adult    Hip pain, left    Heart murmur, systolic    Fibroadenoma of right breast    Fatigue    Early satiety    Dizziness    Anosmia    Encounter to discuss test results    Abnormal thyroid blood test    Elevated alkaline phosphatase level    Graves' disease    Biliary dyskinesia    Orthostatic hypotension       Past Surgical History:   Procedure Laterality Date    BREAST BIOPSY Left 09/2020    fibroadenoma    BREAST BIOPSY Right 06/2021    ductal carcinoma in situ    COLONOSCOPY  01/2023    no polyps-due 2033    HYSTERECTOMY  1990       Review of Systems  This patient has  NO history of seizures/ CAD or CVA    NO history of recent Covid nor flu symptoms,  NO Fever nor chills,  NO Chest pain, shortness of breath nor paroxysmal nocturnal dyspnea,  NO Nausea, vomiting, nor diarrhea,  NO Hematochezia nor melena,  NO Dysuria, hematuria, nor new incontinence issues  NO new severe headaches nor neurological complaints,  NO new issues with anxiety nor depression nor new psychiatric complaints,  NO suicidal nor homicidal ideations.     OBJECTIVE:  /88   Pulse 84   Temp 36.2 °C (97.1 °F) (Temporal)   Resp 16   Ht 1.549 m (5' 1\")   Wt 81.2 kg (179 lb)   LMP 01/01/1990 (Approximate)   SpO2 98%   BMI 33.82 kg/m²      General:  alert, oriented, no acute distress.  No obvious skin " rashes noted.   No gait disturbance noted.    Mood is pleasant,  no signs of emotional distress.   Not appearing intoxicated or altered.   No voiced delusions,   Normal, appropriate behavior.    HEENT: Normocephalic, atraumatic,   Pupils round, reactive to light  Extraocular motions intact and wnl  Tympanic membranes normal    Neck: no nuchal rigidity  No masses palpable.  No carotid bruits.  No thyromegaly.    Respiratory: Equal breath sounds  No wheezes,    rales,    nor rhonchi  No respiratory distress.    Heart: Regular rate and rhythm, 2/6 sys    murmur  no rubs/gallops    Abdomen: no masses palpable, nontender, no rebound nor guarding.  ovwt  Extremities: NO cyanosis noted, no clubbing.   No edema noted.  2+dorsalis pedis pulses.    Normal-not antalgic, steady gait.    Lab on 04/04/2024   Component Date Value Ref Range Status    Thyroid Stimulating Hormone 04/04/2024 2.40  0.44 - 3.98 mIU/L Final    Thyroxine, Free 04/04/2024 0.55 (L)  0.61 - 1.12 ng/dL Final    Triiodothyronine 04/04/2024 99  60 - 200 ng/dL Final   Lab on 02/27/2024   Component Date Value Ref Range Status    Thyroid Stimulating Hormone 02/27/2024 3.39  0.44 - 3.98 mIU/L Final    Thyroxine, Free 02/27/2024 0.86  0.61 - 1.12 ng/dL Final    Triiodothyronine 02/27/2024 108  60 - 200 ng/dL Final    TSI 02/27/2024 3.4 (H)  <=1.3 TSI index Final       Test Performed by:  [facility]  [address] [id number]  : Dany Clark M.D. Ph.D.; CLIA# [id number]        Assessment/Plan     Problem List Items Addressed This Visit       Anxiety    Ductal carcinoma in situ (DCIS) of right breast    Essential hypertension    Hyperglycemia    Mixed hyperlipidemia    Hyperthyroidism    Graves' disease     Other Visit Diagnoses       Encounter for screening mammogram for malignant neoplasm of breast                Follow up at next scheduled visit cmp and hba1c in 4mo  The patient is aware  that results will be forthcoming of ALL planned labs and or tests. If no results are received on my chart or by letter within 1 - 3 weeks, the patient is aware they need to call to obtain results, as this is not usual. Also, if any new conditions arise, or current condition worsens, it is understood that sooner appointment should be made or urgent care/convenient care or emergency room treatment should be sought depending on severity. Otherwise follow up for recheck at regular intervals as we have discussed, at least yearly.

## 2024-05-14 NOTE — PROGRESS NOTES
Covid vax: x 3  Flu: n/a  Pneumo: UTD  RSV: advised  Shingles: advised    CRC: due 2033  Mammogram: 7/2023-scheduled  Pap: hysterectomy  Lmp: hysterectomy

## 2024-06-10 ENCOUNTER — LAB (OUTPATIENT)
Dept: LAB | Facility: LAB | Age: 69
End: 2024-06-10
Payer: MEDICARE

## 2024-06-10 DIAGNOSIS — E05.00 GRAVES DISEASE: ICD-10-CM

## 2024-06-10 LAB
T4 FREE SERPL-MCNC: 0.73 NG/DL (ref 0.61–1.12)
TSH SERPL-ACNC: 1.32 MIU/L (ref 0.44–3.98)

## 2024-06-10 PROCEDURE — 84439 ASSAY OF FREE THYROXINE: CPT

## 2024-06-10 PROCEDURE — 84445 ASSAY OF TSI GLOBULIN: CPT

## 2024-06-10 PROCEDURE — 36415 COLL VENOUS BLD VENIPUNCTURE: CPT

## 2024-06-10 PROCEDURE — 84443 ASSAY THYROID STIM HORMONE: CPT

## 2024-06-14 LAB — TSI SER-ACNC: 3.2 TSI INDEX

## 2024-07-16 ENCOUNTER — APPOINTMENT (OUTPATIENT)
Dept: ENDOCRINOLOGY | Facility: CLINIC | Age: 69
End: 2024-07-16
Payer: MEDICARE

## 2024-07-16 VITALS
HEART RATE: 73 BPM | SYSTOLIC BLOOD PRESSURE: 115 MMHG | WEIGHT: 178 LBS | DIASTOLIC BLOOD PRESSURE: 75 MMHG | BODY MASS INDEX: 33.61 KG/M2 | HEIGHT: 61 IN

## 2024-07-16 DIAGNOSIS — E05.00 GRAVES DISEASE: Primary | ICD-10-CM

## 2024-07-16 PROCEDURE — 3078F DIAST BP <80 MM HG: CPT | Performed by: INTERNAL MEDICINE

## 2024-07-16 PROCEDURE — 3008F BODY MASS INDEX DOCD: CPT | Performed by: INTERNAL MEDICINE

## 2024-07-16 PROCEDURE — 99214 OFFICE O/P EST MOD 30 MIN: CPT | Performed by: INTERNAL MEDICINE

## 2024-07-16 PROCEDURE — 1036F TOBACCO NON-USER: CPT | Performed by: INTERNAL MEDICINE

## 2024-07-16 PROCEDURE — 1159F MED LIST DOCD IN RCRD: CPT | Performed by: INTERNAL MEDICINE

## 2024-07-16 PROCEDURE — 3074F SYST BP LT 130 MM HG: CPT | Performed by: INTERNAL MEDICINE

## 2024-07-16 NOTE — PROGRESS NOTES
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 We saw her in Oct 2023 and diagnosed her with Grave's disease and significant hyperthyroid Sx. We started her on MMI and the dose was lowered gradually to 2.5 5 mg 4 days/week for the past few months.     Interval HX: improved in all sx of hyperthyroidism; less bowel movement; no palpitations; gained weight; no sweat or hot flashes; eyes less teary      No new meds otherwise   ROS : Otherwise negative      PE:  GEN: NAD, AAOX3  HEENT : No noted exophthalmos, lid retraction, or lid lag. No chemosis or tearing.   NECK: horizontal scar noted over anterior neck, less palpable thyroid tissue on the R, thick isthmus and nodular thyroid noted on the L  CV:S1.S2,RRR  Neuro : no FND, minimal delay in DTR, no  tremor on outstretched hands  LE: no peripheral edema; no myopathy  Skin: warm and dry.   Psych: Appropriate mood and behavior         Impression/ Plan     Doing well; clinically and biochemcially euthyroid ; will keep the dose of MMI  at  2.5 mg 4 days/ week.  Labs in 3 months; return in 6 months with labs then also.    Discuss long term management ; favor continuing MMI; would not use NI in light of the minimal eye sx. OK to proceed with gall bladder surgery if needed.     Geno Nickerson MD

## 2024-08-01 ENCOUNTER — APPOINTMENT (OUTPATIENT)
Dept: SURGICAL ONCOLOGY | Facility: HOSPITAL | Age: 69
End: 2024-08-01
Payer: MEDICARE

## 2024-08-05 ENCOUNTER — HOSPITAL ENCOUNTER (OUTPATIENT)
Dept: RADIOLOGY | Facility: HOSPITAL | Age: 69
Discharge: HOME | End: 2024-08-05
Payer: MEDICARE

## 2024-08-05 ENCOUNTER — OFFICE VISIT (OUTPATIENT)
Dept: SURGICAL ONCOLOGY | Facility: HOSPITAL | Age: 69
End: 2024-08-05
Payer: MEDICARE

## 2024-08-05 VITALS
HEART RATE: 70 BPM | WEIGHT: 178 LBS | DIASTOLIC BLOOD PRESSURE: 90 MMHG | SYSTOLIC BLOOD PRESSURE: 154 MMHG | BODY MASS INDEX: 33.61 KG/M2 | HEIGHT: 61 IN

## 2024-08-05 DIAGNOSIS — D05.11 DUCTAL CARCINOMA IN SITU (DCIS) OF RIGHT BREAST: ICD-10-CM

## 2024-08-05 DIAGNOSIS — D05.11 INTRADUCTAL CARCINOMA IN SITU OF RIGHT BREAST: ICD-10-CM

## 2024-08-05 PROCEDURE — 99213 OFFICE O/P EST LOW 20 MIN: CPT | Performed by: SURGERY

## 2024-08-05 PROCEDURE — 3008F BODY MASS INDEX DOCD: CPT | Performed by: SURGERY

## 2024-08-05 PROCEDURE — 3077F SYST BP >= 140 MM HG: CPT | Performed by: SURGERY

## 2024-08-05 PROCEDURE — G0279 TOMOSYNTHESIS, MAMMO: HCPCS | Performed by: STUDENT IN AN ORGANIZED HEALTH CARE EDUCATION/TRAINING PROGRAM

## 2024-08-05 PROCEDURE — 77062 BREAST TOMOSYNTHESIS BI: CPT

## 2024-08-05 PROCEDURE — 1036F TOBACCO NON-USER: CPT | Performed by: SURGERY

## 2024-08-05 PROCEDURE — 1159F MED LIST DOCD IN RCRD: CPT | Performed by: SURGERY

## 2024-08-05 PROCEDURE — 3080F DIAST BP >= 90 MM HG: CPT | Performed by: SURGERY

## 2024-08-05 PROCEDURE — 77066 DX MAMMO INCL CAD BI: CPT | Performed by: STUDENT IN AN ORGANIZED HEALTH CARE EDUCATION/TRAINING PROGRAM

## 2024-08-05 NOTE — PROGRESS NOTES
BREAST SURGICAL ONCOLOGY FOLLOW UP     Assessment/Plan     1. right breast DCIS g1 ER/MS+ measuring 2cm on excision   -pTisNxMx stage 0  -reviewed at tumor board no re-excision or XRT recommended  -on Arimidex     2. left breast fibroadenoma at 9:00 5cmFN measuring 2.0cm    Clinical breast exam and mammogram today are normal.  There is no evidence of local recurrence.    Continue follow up with medical oncology as scheduled.    Will follow up in the breast center with my nurse practitioner partner in 1 year at the time of mammogram or sooner if there are any breast concerns.  I will see Marisol  on an as needed basis for any concerns.    Subjective   Marisol Weiner is a 69 y.o. female presents today for follow up carcinoma of the right breast.     Treatment history  Surgery: 6/25/2021 right breast excisional biopsy, low grade DCIS ER/MS+ involving a fibroadenoma. Margins negative. Lateral margin 1mm, all others >2mm. Case reviewed at Tumor board- given encapsulated FA with low grade DCIS, no margin excision or radiation recommended.   Radiation: Not recommended per Tumor Board  Systemic therapy: adjuvant Arimidex then discontinued due to issues with bone density.  Now on tamoxifen.      Has since been diagnosed with hyperthyroidism.  Under the care of endocrinology and on Tapazole.      Bilateral mammogram today: BIRADS 2, benign.    Review of Systems   Constitutional symptoms: Denies generalized fatigue.  Denies weight change, fevers/chills, difficulty sleeping   Eyes: Denies double vision, glaucoma, cataracts.  Ear/nose/throat/mouth: Denies hearing changes, sore throat, sinus problems.  Cardiovascular: No chest pain.  Denies irregular heartbeat.  Denies ankle swelling.  Respiratory: No wheezing, cough, or shortness of breath.  Gastrointestinal: No abdominal pain,  No nausea/vomiting.  No indigestion/heartburn.  No change in bowel habits.  No constipation or diarrhea.   Genitourinary: No urinary incontinence.   No urinary frequency.  No painful urination.  Musculoskeletal: No bone pain, no muscle pain, no joint pain.   Integumentary: No rash. No masses.  No changes in moles.  No easy bruising.  Neurological: No headaches.  No tremors. No numbness/tingling.  Psychiatric: No anxiety. No depression.  Endocrine: No excessive thirst.  Not too hot or too cold.  Not tired or fatigued.    Hematological/lymphatic: No swollen glands or blood clotting problems.  No bruising.    Objective   Physical Exam  General: Alert and oriented x 3.  Mood and affect are appropriate.  HEENT: EOMI, PERRLA.   Neck: supple, no masses, no cervical adenopathy.  Cardiovascular: no lower extremity edema.  Pulmonary: breathing non labored on room air.  GI: Abdomen soft, no masses. No hepatomegaly or splenomegaly.  Lymph nodes: No supraclavicular or axillary adenopathy bilaterally.  Musculoskeletal: Full range of motion in the upper extremities bilaterally.  Neuro: denies dizziness, tremors    Breasts: The breasts were examined in both the seated and supine positions.    RIGHT: The nipple is everted without nipple discharge.  There are no skin changes, skin thickening, or dimpling. There are no masses palpated in the RIGHT breast. Well healed periareolar incision.  LEFT: The nipple is everted without nipple discharge.  There are no skin changes, skin thickening, or dimpling. There are no masses palpated in the LEFT breast.       Radiology review: All images and reports were personally reviewed.         Sandra Lao, DO

## 2024-08-06 ENCOUNTER — APPOINTMENT (OUTPATIENT)
Dept: PRIMARY CARE | Facility: CLINIC | Age: 69
End: 2024-08-06
Payer: MEDICARE

## 2024-09-06 DIAGNOSIS — E78.2 MIXED HYPERLIPIDEMIA: ICD-10-CM

## 2024-09-07 RX ORDER — ATORVASTATIN CALCIUM 40 MG/1
40 TABLET, FILM COATED ORAL NIGHTLY
Qty: 90 TABLET | Refills: 3 | Status: SHIPPED | OUTPATIENT
Start: 2024-09-07

## 2024-09-09 DIAGNOSIS — D05.11 DUCTAL CARCINOMA IN SITU (DCIS) OF RIGHT BREAST: ICD-10-CM

## 2024-09-09 RX ORDER — TAMOXIFEN CITRATE 20 MG/1
20 TABLET ORAL DAILY
Qty: 30 TABLET | Refills: 11 | Status: SHIPPED | OUTPATIENT
Start: 2024-09-09 | End: 2025-09-09

## 2024-09-27 ENCOUNTER — LAB (OUTPATIENT)
Dept: LAB | Facility: LAB | Age: 69
End: 2024-09-27
Payer: MEDICARE

## 2024-09-27 DIAGNOSIS — E05.00 GRAVES DISEASE: ICD-10-CM

## 2024-09-27 LAB
T3FREE SERPL-MCNC: 3 PG/ML (ref 2.3–4.2)
T4 FREE SERPL-MCNC: 0.96 NG/DL (ref 0.61–1.12)
TSH SERPL-ACNC: 0.14 MIU/L (ref 0.44–3.98)

## 2024-09-27 PROCEDURE — 84481 FREE ASSAY (FT-3): CPT

## 2024-09-27 PROCEDURE — 84439 ASSAY OF FREE THYROXINE: CPT

## 2024-09-27 PROCEDURE — 84443 ASSAY THYROID STIM HORMONE: CPT

## 2024-10-16 LAB — TSI SER-ACNC: 2.8 TSI INDEX

## 2024-10-23 ENCOUNTER — LAB (OUTPATIENT)
Dept: LAB | Facility: LAB | Age: 69
End: 2024-10-23
Payer: MEDICARE

## 2024-10-23 ENCOUNTER — APPOINTMENT (OUTPATIENT)
Dept: PRIMARY CARE | Facility: CLINIC | Age: 69
End: 2024-10-23
Payer: MEDICARE

## 2024-10-23 VITALS
RESPIRATION RATE: 16 BRPM | SYSTOLIC BLOOD PRESSURE: 110 MMHG | TEMPERATURE: 96.8 F | DIASTOLIC BLOOD PRESSURE: 62 MMHG | HEART RATE: 68 BPM | OXYGEN SATURATION: 98 % | WEIGHT: 191 LBS | BODY MASS INDEX: 36.06 KG/M2 | HEIGHT: 61 IN

## 2024-10-23 DIAGNOSIS — E05.00 GRAVES' DISEASE: ICD-10-CM

## 2024-10-23 DIAGNOSIS — R74.8 ELEVATED ALKALINE PHOSPHATASE LEVEL: ICD-10-CM

## 2024-10-23 DIAGNOSIS — E78.2 MIXED HYPERLIPIDEMIA: ICD-10-CM

## 2024-10-23 DIAGNOSIS — R73.9 HYPERGLYCEMIA: ICD-10-CM

## 2024-10-23 DIAGNOSIS — Z00.00 ENCOUNTER FOR MEDICARE ANNUAL WELLNESS EXAM: ICD-10-CM

## 2024-10-23 DIAGNOSIS — Z12.31 ENCOUNTER FOR SCREENING MAMMOGRAM FOR MALIGNANT NEOPLASM OF BREAST: ICD-10-CM

## 2024-10-23 DIAGNOSIS — F41.9 ANXIETY: ICD-10-CM

## 2024-10-23 DIAGNOSIS — Z71.89 EARLY INTERVENTION COUNSELING: Primary | ICD-10-CM

## 2024-10-23 DIAGNOSIS — I10 ESSENTIAL HYPERTENSION: ICD-10-CM

## 2024-10-23 DIAGNOSIS — D05.11 DUCTAL CARCINOMA IN SITU (DCIS) OF RIGHT BREAST: ICD-10-CM

## 2024-10-23 LAB
ALBUMIN SERPL BCP-MCNC: 3.9 G/DL (ref 3.4–5)
ALP SERPL-CCNC: 73 U/L (ref 33–136)
ALT SERPL W P-5'-P-CCNC: 11 U/L (ref 7–45)
ANION GAP SERPL CALC-SCNC: 8 MMOL/L (ref 10–20)
AST SERPL W P-5'-P-CCNC: 14 U/L (ref 9–39)
BASOPHILS # BLD AUTO: 0.03 X10*3/UL (ref 0–0.1)
BASOPHILS NFR BLD AUTO: 0.5 %
BILIRUB SERPL-MCNC: 0.5 MG/DL (ref 0–1.2)
BUN SERPL-MCNC: 18 MG/DL (ref 6–23)
CALCIUM SERPL-MCNC: 9.2 MG/DL (ref 8.6–10.3)
CHLORIDE SERPL-SCNC: 106 MMOL/L (ref 98–107)
CHOLEST SERPL-MCNC: 177 MG/DL (ref 0–199)
CHOLESTEROL/HDL RATIO: 2.2
CO2 SERPL-SCNC: 31 MMOL/L (ref 21–32)
CREAT SERPL-MCNC: 0.85 MG/DL (ref 0.5–1.05)
EGFRCR SERPLBLD CKD-EPI 2021: 73 ML/MIN/1.73M*2
EOSINOPHIL # BLD AUTO: 0.07 X10*3/UL (ref 0–0.4)
EOSINOPHIL NFR BLD AUTO: 1.3 %
ERYTHROCYTE [DISTWIDTH] IN BLOOD BY AUTOMATED COUNT: 13 % (ref 11.5–14.5)
GLUCOSE SERPL-MCNC: 92 MG/DL (ref 74–99)
HCT VFR BLD AUTO: 44.5 % (ref 36–46)
HDLC SERPL-MCNC: 80.7 MG/DL
HGB BLD-MCNC: 14.6 G/DL (ref 12–16)
IMM GRANULOCYTES # BLD AUTO: 0.01 X10*3/UL (ref 0–0.5)
IMM GRANULOCYTES NFR BLD AUTO: 0.2 % (ref 0–0.9)
LDLC SERPL CALC-MCNC: 78 MG/DL
LYMPHOCYTES # BLD AUTO: 2.46 X10*3/UL (ref 0.8–3)
LYMPHOCYTES NFR BLD AUTO: 44.3 %
MAGNESIUM SERPL-MCNC: 2.07 MG/DL (ref 1.6–2.4)
MCH RBC QN AUTO: 28.1 PG (ref 26–34)
MCHC RBC AUTO-ENTMCNC: 32.8 G/DL (ref 32–36)
MCV RBC AUTO: 86 FL (ref 80–100)
MONOCYTES # BLD AUTO: 0.51 X10*3/UL (ref 0.05–0.8)
MONOCYTES NFR BLD AUTO: 9.2 %
NEUTROPHILS # BLD AUTO: 2.47 X10*3/UL (ref 1.6–5.5)
NEUTROPHILS NFR BLD AUTO: 44.5 %
NON HDL CHOLESTEROL: 96 MG/DL (ref 0–149)
NRBC BLD-RTO: 0 /100 WBCS (ref 0–0)
PLATELET # BLD AUTO: 255 X10*3/UL (ref 150–450)
POTASSIUM SERPL-SCNC: 4.4 MMOL/L (ref 3.5–5.3)
PROT SERPL-MCNC: 6.9 G/DL (ref 6.4–8.2)
RBC # BLD AUTO: 5.2 X10*6/UL (ref 4–5.2)
SODIUM SERPL-SCNC: 141 MMOL/L (ref 136–145)
TRIGL SERPL-MCNC: 92 MG/DL (ref 0–149)
VLDL: 18 MG/DL (ref 0–40)
WBC # BLD AUTO: 5.6 X10*3/UL (ref 4.4–11.3)

## 2024-10-23 PROCEDURE — G0439 PPPS, SUBSEQ VISIT: HCPCS | Performed by: FAMILY MEDICINE

## 2024-10-23 PROCEDURE — 85025 COMPLETE CBC W/AUTO DIFF WBC: CPT

## 2024-10-23 PROCEDURE — 1159F MED LIST DOCD IN RCRD: CPT | Performed by: FAMILY MEDICINE

## 2024-10-23 PROCEDURE — 99214 OFFICE O/P EST MOD 30 MIN: CPT | Performed by: FAMILY MEDICINE

## 2024-10-23 PROCEDURE — 83735 ASSAY OF MAGNESIUM: CPT

## 2024-10-23 PROCEDURE — G0444 DEPRESSION SCREEN ANNUAL: HCPCS | Performed by: FAMILY MEDICINE

## 2024-10-23 PROCEDURE — 80061 LIPID PANEL: CPT

## 2024-10-23 PROCEDURE — 3008F BODY MASS INDEX DOCD: CPT | Performed by: FAMILY MEDICINE

## 2024-10-23 PROCEDURE — 1036F TOBACCO NON-USER: CPT | Performed by: FAMILY MEDICINE

## 2024-10-23 PROCEDURE — 80053 COMPREHEN METABOLIC PANEL: CPT

## 2024-10-23 PROCEDURE — 1170F FXNL STATUS ASSESSED: CPT | Performed by: FAMILY MEDICINE

## 2024-10-23 PROCEDURE — 1160F RVW MEDS BY RX/DR IN RCRD: CPT | Performed by: FAMILY MEDICINE

## 2024-10-23 PROCEDURE — 3074F SYST BP LT 130 MM HG: CPT | Performed by: FAMILY MEDICINE

## 2024-10-23 PROCEDURE — 36415 COLL VENOUS BLD VENIPUNCTURE: CPT

## 2024-10-23 PROCEDURE — 3078F DIAST BP <80 MM HG: CPT | Performed by: FAMILY MEDICINE

## 2024-10-23 PROCEDURE — 83036 HEMOGLOBIN GLYCOSYLATED A1C: CPT

## 2024-10-23 PROCEDURE — G0446 INTENS BEHAVE THER CARDIO DX: HCPCS | Performed by: FAMILY MEDICINE

## 2024-10-23 ASSESSMENT — ACTIVITIES OF DAILY LIVING (ADL)
BATHING: INDEPENDENT
DOING_HOUSEWORK: INDEPENDENT
GROCERY_SHOPPING: INDEPENDENT
TAKING_MEDICATION: INDEPENDENT
MANAGING_FINANCES: INDEPENDENT
DRESSING: INDEPENDENT

## 2024-10-23 ASSESSMENT — ENCOUNTER SYMPTOMS
LOSS OF SENSATION IN FEET: 0
DEPRESSION: 0
OCCASIONAL FEELINGS OF UNSTEADINESS: 0

## 2024-10-23 NOTE — PROGRESS NOTES
Covid vax: x 3  Flu: declined  Pneumo: advised  RSV: advised  Shingles: advised    CRC: due 2033  Mammogram: 8/2024  Pap: hysterectomy  Lmp: hysterectomy

## 2024-10-23 NOTE — PROGRESS NOTES
Subjective   Reason for Visit: Marisol Weiner is a 69 y.o. female here for a Medicare Wellness visit.   Covid vax: x 3  Flu: declined  Pneumo: advised  RSV: advised  Shingles: advised     CRC: due 2033  Mammogram: 8/2024  Pap: hysterectomy  Lmp: hysterectomy  Just as independent as last year    CHECKLIST REVIEWED AND COMPLETE FOR [facility] Medicare Annual Wellness Visit Subsequent, Hypertension, and Hyperlipidemia  ---------------------------------------------------------------------------------------------  Past Medical, Surgical, and Family History reviewed and updated in chart.    Reviewed all medications by prescribing practitioner or clinical pharmacist (such as prescriptions, OTCs, herbal therapies and supplements) and documented in the medical record.    HPI    Patient Self Assessment of Health Status  Patient Self Assessment: Good    Nutrition and Exercise:    Current Diet: HEALTHY Diet always best, minimizing excess carbs,   weight reduction advised if BMI not WNL, please maintain a NORMAL BMI 18.5-24.9    Adequate Fluid Intake: Yes  Caffeine: -aware to minimize intake, <300mg best to even take in LESS  Exercise Frequency: Regularly advised, weight bearing, strengthening, aerobic    Functional Ability/Level of Safety:  Home safety addressed: no active new concerns,   fall risk addressed  NO new hearing issues or concerns  [place] in ADLs addressed and areas of assistance if present -noted    ANY Cognitive Impairment Observed: No cognitive impairment observed    Home Safety Risk Factors: None  --------------------------------------------------------------------------------------------  Patient Care Team:  Mona Emanuel MD as PCP - General    HPI  Patient Active Problem List   Diagnosis    Anxiety    Ductal carcinoma in situ (DCIS) of right breast    Essential hypertension    Hyperglycemia    Mixed hyperlipidemia    Hyperthyroidism    Gallstones    Weight loss    Tachycardia    Palpitations    Other  "microscopic hematuria    Numbness in left leg    Nausea in adult    Hip pain, left    Heart murmur, systolic    Fibroadenoma of right breast    Fatigue    Early satiety    Dizziness    Anosmia    Encounter to discuss test results    Abnormal thyroid blood test    Elevated alkaline phosphatase level    Graves' disease    Biliary dyskinesia    Orthostatic hypotension      Past Surgical History:   Procedure Laterality Date    BREAST BIOPSY Left 09/2020    fibroadenoma    BREAST BIOPSY Right 06/2021    ductal carcinoma in situ    COLONOSCOPY  01/2023    no polyps-due 2033    HYSTERECTOMY  1990         PHQ2(-) No active depressed mood or not in crisis, 5min. spent in discussion.    Review of Systems:    NO Seizures  NO CAD  NO CVA    This patient has   NO history of recent Covid nor flu symptoms,  NO Fever nor chills,  NO Chest pain, shortness of breath nor paroxysmal nocturnal dyspnea,  NO Nausea, vomiting, nor diarrhea,  NO Hematochezia nor melena,  NO Dysuria, hematuria, nor new incontinence issues  NO new severe headaches nor neurological complaints,  NO new issues with anxiety nor depression nor new psychiatric complaints,  NO suicidal nor homicidal ideations.  ---------------------------------------------------------------------------------------------   OBJECTIVE:  /62   Pulse 68   Temp 36 °C (96.8 °F) (Temporal)   Resp 16   Ht 1.549 m (5' 1\")   Wt 86.6 kg (191 lb)   LMP 01/01/1990 (Approximate)   SpO2 98%   BMI 36.09 kg/m²      General:  alert, oriented, no acute distress.  No obvious skin rashes noted.   No gait disturbance noted.    Mood is pleasant, not tearful, no signs of emotional distress.  Not appearing intoxicated or altered.   No voiced delusions,   Normal, appropriate behavior.    HEENT: Normocephalic, atraumatic,   Pupils round, reactive to light  Extraocular motions intact and wnl  Tympanic membranes normal    Neck: no nuchal rigidity  No masses palpable.  No carotid bruits.  No " thyromegaly.    Respiratory: Equal breath sounds  No wheezes,    rales,    nor rhonchi  No respiratory distress.    Heart: Regular rate and rhythm, no    murmurs  no rubs/gallops    Abdomen: no masses palpable, no rebound nor guarding, no rebound nor guarding.    Extremities: NO cyanosis noted, no clubbing.   No edema noted.  2+dorsalis pedis pulses.    Normal-not antalgic, steady gait.    Lab on 09/27/2024   Component Date Value Ref Range Status    Thyroxine, Free 09/27/2024 0.96  0.61 - 1.12 ng/dL Final    TSI 09/27/2024 2.8 (H)  <=1.3 TSI index Final       Test Performed by:  [facility]  [address] [place] [address]  : Natasha Dutta Ph.D.; CLIA# [id number]    Thyroid Stimulating Hormone 09/27/2024 0.14 (L)  0.44 - 3.98 mIU/L Final    Triiodothyronine, Free 09/27/2024 3.0  2.3 - 4.2 pg/mL Final        Assessment/Plan     Problem List Items Addressed This Visit       Anxiety    Ductal carcinoma in situ (DCIS) of right breast    Essential hypertension    Hyperglycemia    Mixed hyperlipidemia    Elevated alkaline phosphatase level    Graves' disease     Other Visit Diagnoses       Encounter for Medicare annual wellness exam        Encounter for screening mammogram for malignant neoplasm of breast              Advance Care Planning Note   Discussion Date: 10/23/24   Discussion Participants: patient  16 min spent discussing ACP w pt    The patient wishes to discuss Advance Care Planning today and the following is a brief summary of our discussion.     Patient has capacity to make their own medical decisions: Yes  Health Care Agent/Surrogate Decision Maker documented in chart: Yes    Documents on file and valid:  Advance Directive/Living Will:  advised today  Health Care Power of :  advised today  Communication of Medical Status/Prognosis:   yes   Communication of Treatment Goals/Options:   yes  Treatment Decisions/involved with patient  today  yes  Time Statement: Total face to face time spent on advance care planning was <30 minutes with <30 minutes spent in counseling, including the explanation.    SEE ME AT NEXT REGULARLY SCHEDULED VISIT-sooner if condition deteriorates or new problems arise.    PATIENT WOULD LIKE TO BE A FULL CODE  Rare use xanax    NO uncontrolled DEPRESSION noted  Assessed and reviewed for opioid use.  NO EVIDENCE OF SIGNIFICANT DEMENTIA    Signs and symptoms of concern with depression-if in crisis -(no current HI/SI) will let us know and proceed to ER   Signs/symptoms of concern with dementia-and need to contact us if they occur discussed.    ASCVD   11.8%   addressed and risk reduction conversation took place  Sees [facility]    All above counseling 15 minutes in conversation/documentation etc    Mood counseling 5min- no uncontrolled depression, good support system, has crisis plan if occurs.  Included BMI counseling and options if BMI>30  QUESTIONS answered  Ft4 ok  Labs due  The patient is aware that results will be forthcoming of ALL planned labs and or tests. If no results are received on my chart or by letter within 1 - 3 weeks, the patient is aware they need to call to obtain results, as this is not usual. Also, if any new conditions arise, or current condition worsens, it is understood that sooner appointment should be made or urgent care/convenient care or emergency room treatment should be sought depending on severity. Otherwise follow up for recheck at regular intervals as we have discussed, at least yearly.    Mood is good  Next visit addressed -regular visit as scheduled and follow up sooner if condition deterioration or new problems arise.    This completes Marisol Weiner ANNUAL MEDICARE WELLNESS VISIT today.  If no other follow ups discussed: Please follow up in 1 year for NEXT ANNUAL MEDICARE WELLNESS VISIT.  6mo  Mona Emanuel MD    This documentation is subject to inadvertent typing and other similar  clerical/grammatic errors etc.

## 2024-10-24 LAB
EST. AVERAGE GLUCOSE BLD GHB EST-MCNC: 103 MG/DL
HBA1C MFR BLD: 5.2 %

## 2024-12-05 ENCOUNTER — APPOINTMENT (OUTPATIENT)
Dept: HEMATOLOGY/ONCOLOGY | Facility: CLINIC | Age: 69
End: 2024-12-05
Payer: MEDICARE

## 2024-12-06 ENCOUNTER — APPOINTMENT (OUTPATIENT)
Dept: HEMATOLOGY/ONCOLOGY | Facility: CLINIC | Age: 69
End: 2024-12-06
Payer: MEDICARE

## 2025-01-21 ENCOUNTER — LAB (OUTPATIENT)
Dept: LAB | Facility: LAB | Age: 70
End: 2025-01-21
Payer: COMMERCIAL

## 2025-01-21 ENCOUNTER — APPOINTMENT (OUTPATIENT)
Dept: ENDOCRINOLOGY | Facility: CLINIC | Age: 70
End: 2025-01-21
Payer: MEDICARE

## 2025-01-21 VITALS
DIASTOLIC BLOOD PRESSURE: 70 MMHG | WEIGHT: 192 LBS | HEIGHT: 61 IN | SYSTOLIC BLOOD PRESSURE: 111 MMHG | BODY MASS INDEX: 36.25 KG/M2 | HEART RATE: 84 BPM

## 2025-01-21 DIAGNOSIS — E05.00 GRAVES DISEASE: Primary | ICD-10-CM

## 2025-01-21 DIAGNOSIS — E05.00 GRAVES' DISEASE: Primary | ICD-10-CM

## 2025-01-21 DIAGNOSIS — E05.90 HYPERTHYROIDISM: ICD-10-CM

## 2025-01-21 DIAGNOSIS — E05.00 GRAVES DISEASE: ICD-10-CM

## 2025-01-21 DIAGNOSIS — M81.6 LOCALIZED OSTEOPOROSIS WITHOUT CURRENT PATHOLOGICAL FRACTURE: ICD-10-CM

## 2025-01-21 LAB
ALBUMIN SERPL BCP-MCNC: 3.9 G/DL (ref 3.4–5)
ALP SERPL-CCNC: 62 U/L (ref 33–136)
ALT SERPL W P-5'-P-CCNC: 12 U/L (ref 7–45)
ANION GAP SERPL CALC-SCNC: 10 MMOL/L (ref 10–20)
AST SERPL W P-5'-P-CCNC: 13 U/L (ref 9–39)
BILIRUB SERPL-MCNC: 0.5 MG/DL (ref 0–1.2)
BUN SERPL-MCNC: 13 MG/DL (ref 6–23)
CALCIUM SERPL-MCNC: 8.6 MG/DL (ref 8.6–10.3)
CHLORIDE SERPL-SCNC: 107 MMOL/L (ref 98–107)
CO2 SERPL-SCNC: 28 MMOL/L (ref 21–32)
CREAT SERPL-MCNC: 0.61 MG/DL (ref 0.5–1.05)
EGFRCR SERPLBLD CKD-EPI 2021: >90 ML/MIN/1.73M*2
GLUCOSE SERPL-MCNC: 89 MG/DL (ref 74–99)
POTASSIUM SERPL-SCNC: 4.1 MMOL/L (ref 3.5–5.3)
PROT SERPL-MCNC: 6.8 G/DL (ref 6.4–8.2)
SODIUM SERPL-SCNC: 141 MMOL/L (ref 136–145)
T4 FREE SERPL-MCNC: 0.75 NG/DL (ref 0.61–1.12)
TSH SERPL-ACNC: 0.94 MIU/L (ref 0.44–3.98)

## 2025-01-21 PROCEDURE — 84443 ASSAY THYROID STIM HORMONE: CPT

## 2025-01-21 PROCEDURE — 3074F SYST BP LT 130 MM HG: CPT | Performed by: INTERNAL MEDICINE

## 2025-01-21 PROCEDURE — 3008F BODY MASS INDEX DOCD: CPT | Performed by: INTERNAL MEDICINE

## 2025-01-21 PROCEDURE — 36415 COLL VENOUS BLD VENIPUNCTURE: CPT

## 2025-01-21 PROCEDURE — 84445 ASSAY OF TSI GLOBULIN: CPT

## 2025-01-21 PROCEDURE — 1159F MED LIST DOCD IN RCRD: CPT | Performed by: INTERNAL MEDICINE

## 2025-01-21 PROCEDURE — 99214 OFFICE O/P EST MOD 30 MIN: CPT | Performed by: INTERNAL MEDICINE

## 2025-01-21 PROCEDURE — 80053 COMPREHEN METABOLIC PANEL: CPT

## 2025-01-21 PROCEDURE — 3078F DIAST BP <80 MM HG: CPT | Performed by: INTERNAL MEDICINE

## 2025-01-21 PROCEDURE — 84439 ASSAY OF FREE THYROXINE: CPT

## 2025-01-21 RX ORDER — METHIMAZOLE 5 MG/1
2.5 TABLET ORAL
Qty: 45 TABLET | Refills: 3 | Status: SHIPPED | OUTPATIENT
Start: 2025-01-21 | End: 2026-01-21

## 2025-01-21 NOTE — PROGRESS NOTES
Reason for visit: Follow-up on Grave's diease     HPI:   Ms. 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     Background history:  -Established care with us in Oct 2023 and diagnosed her with Grave's disease and significant hyperthyroid Sx. She was started on MMI and the dose was lowered gradually to 2.5 5 mg 4 days/week    Today:  Energy Okay   No sleep disturbance   Good appetite. Weight increased by 7 kg since last visit in 7/2024   Ocular sx: Dry eyes using refresh tears, no tearing, no gritty sensation, no itching, no redness.  Reporting photophobia from sun.  No hoarseness, trouble swallowing, or compressive sx   Denies any chest pain, shortness of breath, palpitations.  No changes in bowel habits. 1 BM daily with constipation   No tremors,prox muscle weakness, cramps, tingling.  No heat/cold intol  No nausea/vomit or abdominal pain    Of note, patient had a DEXA scan done in February 2024 that showed a lowest T-score of -3.1 in her lumbar spine.  She was started on calcium 1200 daily by her PCP as well as vitamin D 2000 international units daily.  She reports daily adherence to her vitamin D however she has not been taking her calcium daily.  She reports that she discussed starting bisphosphonate versus her PCP but decided to hold off.  She was noted to be on anastrozole in the past for at least 2 years but was switched to tamoxifen (and has been on it for 1 year per patient).  She denies any fragility fractures and reports femoral fracture 2-3 years ago in the setting of significant trauma (slipped on ice)    Meds:  Current Outpatient Medications   Medication Instructions    ALPRAZolam (XANAX) 0.25 mg, oral, 3 times daily PRN    amLODIPine (NORVASC) 5 mg, oral, Daily    atorvastatin (LIPITOR) 40 mg, oral, Nightly    cholecalciferol (Vitamin D-3) 50 mcg (2,000 unit) capsule 1 capsule, Daily     methIMAzole (TAPAZOLE) 2.5 mg, oral, Every Day    tamoxifen (NOLVADEX) 20 mg, oral, Daily, Take with water or any other nonalcoholic drink with or without food at around the same time(s) every day.       Objective   Lab Results   Component Value Date    TSH 0.94 01/21/2025    TSH 0.14 (L) 09/27/2024    TSH 1.32 06/10/2024    TSH 2.40 04/04/2024    TSH 3.39 02/27/2024    TSH 5.27 (H) 01/31/2024    TSH 5.79 (H) 01/30/2024    TSH 5.67 (H) 01/03/2024    TSH 44.93 (H) 12/13/2023    TSH 1.60 11/17/2023    FREET4 0.75 01/21/2025    FREET4 0.96 09/27/2024    FREET4 0.73 06/10/2024    FREET4 0.55 (L) 04/04/2024    FREET4 0.86 02/27/2024    FREET4 0.64 01/31/2024    FREET4 0.60 (L) 01/30/2024    FREET4 0.52 (L) 01/03/2024    FREET4 0.28 (L) 12/13/2023    FREET4 0.34 (L) 11/17/2023    G3VTQCJ 99 04/04/2024    I7DJKCS 108 02/27/2024    C9YSBCP 111 01/30/2024    T7NKRRE 103 01/03/2024    Q9TFJLU 79 12/13/2023    P8XZFFL 68 11/17/2023    C2ECVBT 138 10/24/2023    Z2PGOSB 364 (H) 10/03/2023    TSI 2.8 (H) 09/27/2024    TSI 3.2 (H) 06/10/2024    TSI 3.4 (H) 02/27/2024    TSI 5.9 (H) 10/03/2023    THYROIDPAB 35 09/13/2023    THYROGLOBULI <0.9 09/13/2023     Results from last 7 days   Lab Units 01/21/25  1233   SODIUM mmol/L 141   POTASSIUM mmol/L 4.1   CHLORIDE mmol/L 107   CO2 mmol/L 28   BUN mg/dL 13   CREATININE mg/dL 0.61   CALCIUM mg/dL 8.6   PROTEIN TOTAL g/dL 6.8   BILIRUBIN TOTAL mg/dL 0.5   ALK PHOS U/L 62   ALT U/L 12   AST U/L 13   GLUCOSE mg/dL 89      [facility] Reference Range & Units 10/23/24 14:04   WBC 4.4 - 11.3 x10*3/uL 5.6   nRBC 0.0 - 0.0 /100 WBCs 0.0   RBC 4.00 - 5.20 x10*6/uL 5.20   HEMOGLOBIN 12.0 - 16.0 g/dL 14.6   HEMATOCRIT 36.0 - 46.0 % 44.5   MCV 80 - 100 fL 86   MCH 26.0 - 34.0 pg 28.1   MCHC 32.0 - 36.0 g/dL 32.8   RED CELL DISTRIBUTION WIDTH 11.5 - 14.5 % 13.0   Platelets 150 - 450 x10*3/uL 255   Neutrophils % 40.0 - 80.0 % 44.5   Immature Granulocytes %, Automated 0.0 - 0.9 % 0.2   Lymphocytes % 13.0 -  "44.0 % 44.3   Monocytes % 2.0 - 10.0 % 9.2   Eosinophils % 0.0 - 6.0 % 1.3   Basophils % 0.0 - 2.0 % 0.5   Neutrophils Absolute 1.60 - 5.50 x10*3/uL 2.47   Immature Granulocytes Absolute, Automated 0.00 - 0.50 x10*3/uL 0.01   Lymphocytes Absolute 0.80 - 3.00 x10*3/uL 2.46   Monocytes Absolute 0.05 - 0.80 x10*3/uL 0.51   Eosinophils Absolute 0.00 - 0.40 x10*3/uL 0.07   Basophils Absolute 0.00 - 0.10 x10*3/uL 0.03         Last Recorded Vitals  Blood pressure 111/70, pulse 84, height 1.549 m (5' 1\"), weight 87.1 kg (192 lb), last menstrual period 01/01/1990.  Constitutional: -American female, NAD, well groomed. AOx3. Cooperative  Skin/Hair: Warm, dry skin, birthmark over the right thenareminence  HEENT: EOMI, Anicteric scleras, No lid lag or lid retraction, No TTP of ocular globes. Dry oral mucosa. Negative Chvostek sign   Neck: Soft, supple. horizontal scar noted over anterior neck, less palpable thyroid tissue on the R, thick isthmus and nodular thyroid noted on the L   Cardiovascular: normal HR  Extremities: Preserved peripheral pulses, No tremors   Neuro: Moving all extremities spontaneously. CN's grossly intact. No balance or gait disturbances. DTRs: no delay in relaxation phase.      Assessment/Plan   69-year-old female with Graves' disease who presents for follow-up for Graves' disease.  She is also noted to have osteoporosis on her DEXA scan obtained in February 2024.    For Graves' disease:  She is clinically and biochemically euthyroid on methimazole 2.5 mg 4 times per week.  Last TSI was detectable on September 2024 at 2.8  -Continue methimazole 2.5 mg 4 times per week  -For Graves' orbitopathy we discussed with the patient the following:  Refresh Tears gel at night (if waking up with dry eyes) and Refresh Tears clear during the day  -Repeat labs in 3 months prior to follow-up     For osteoporosis:  DEXA scan showing lowest T-score of -3.1 at the lumbar spine -1.6 at left femur neck, -1 at the left " femur.  She has not had any fragility fractures reported humerus fracture 2 to 3 years ago secondary to significant trauma.  She has multiple risk factor for osteoporosis.  Is postmenopausal but she is not sure when she went through menopause as she had a hysterectomy 1990, was treated with anastrozole for breast cancer for at least 2 years, and biochemical &clinical hyperthyroidism in the past.  She is currently on tamoxifen and has been on it for the past year.  Her calcium today is 8.6-8.7 corrected  -Advised to increase vitamin D to 5000 international units daily  -Encouraged adherence to calcium supplementation and to take it in divided doses 600 twice daily with meals  -Since patient is on tamoxifen already would not start an additional bone specific agent at this point and will monitor clinically and with repeat DEXA scan in 1 year (would be due for repeat DEXA scan in February 2026)  -Obtain RFP, and PTH prior to next visit     For constipation: Patient was advised to take magnesium 400 to 500 mg daily over-the-counter    RTC in 4 months   Case seen, examined, and discussed with Dr. Nickerson

## 2025-01-24 LAB — THYROID STIMULATING IMMUNOGLOBULIN: 359 % BASELINE

## 2025-01-29 ENCOUNTER — DOCUMENTATION (OUTPATIENT)
Dept: ENDOCRINOLOGY | Facility: HOSPITAL | Age: 70
End: 2025-01-29
Payer: COMMERCIAL

## 2025-01-29 NOTE — PROGRESS NOTES
Called patient per her request to discuss er TSI results. She was reassured that it's a different assay and she should not be comparing her results from prior to her current level

## 2025-02-25 DIAGNOSIS — I10 PRIMARY HYPERTENSION: ICD-10-CM

## 2025-02-25 RX ORDER — AMLODIPINE BESYLATE 5 MG/1
5 TABLET ORAL DAILY
Qty: 90 TABLET | Refills: 0 | Status: SHIPPED | OUTPATIENT
Start: 2025-02-25

## 2025-03-04 ENCOUNTER — APPOINTMENT (OUTPATIENT)
Dept: CARDIOLOGY | Facility: HOSPITAL | Age: 70
End: 2025-03-04
Payer: MEDICARE

## 2025-03-06 ENCOUNTER — ANCILLARY PROCEDURE (OUTPATIENT)
Dept: CARDIOLOGY | Facility: HOSPITAL | Age: 70
End: 2025-03-06
Payer: MEDICARE

## 2025-03-06 DIAGNOSIS — I27.20 PULMONARY HYPERTENSION (MULTI): ICD-10-CM

## 2025-03-06 DIAGNOSIS — E05.90 HYPERTHYROIDISM: Primary | ICD-10-CM

## 2025-03-06 DIAGNOSIS — E78.2 MIXED HYPERLIPIDEMIA: ICD-10-CM

## 2025-03-06 DIAGNOSIS — R00.2 PALPITATIONS: ICD-10-CM

## 2025-03-06 DIAGNOSIS — R06.02 SHORTNESS OF BREATH: ICD-10-CM

## 2025-03-06 DIAGNOSIS — R01.1 HEART MURMUR, SYSTOLIC: ICD-10-CM

## 2025-03-06 DIAGNOSIS — I10 ESSENTIAL HYPERTENSION: ICD-10-CM

## 2025-03-06 PROCEDURE — 93306 TTE W/DOPPLER COMPLETE: CPT

## 2025-03-06 PROCEDURE — 93306 TTE W/DOPPLER COMPLETE: CPT | Performed by: INTERNAL MEDICINE

## 2025-03-08 LAB
ALBUMIN SERPL-MCNC: 3.8 G/DL (ref 3.6–5.1)
ALP SERPL-CCNC: 59 U/L (ref 37–153)
ALT SERPL-CCNC: 13 U/L (ref 6–29)
ANION GAP SERPL CALCULATED.4IONS-SCNC: 7 MMOL/L (CALC) (ref 7–17)
AST SERPL-CCNC: 13 U/L (ref 10–35)
BILIRUB SERPL-MCNC: 0.5 MG/DL (ref 0.2–1.2)
BUN SERPL-MCNC: 9 MG/DL (ref 7–25)
CALCIUM SERPL-MCNC: 8.9 MG/DL (ref 8.6–10.4)
CHLORIDE SERPL-SCNC: 106 MMOL/L (ref 98–110)
CHOLEST SERPL-MCNC: 153 MG/DL
CHOLEST/HDLC SERPL: 2 (CALC)
CO2 SERPL-SCNC: 27 MMOL/L (ref 20–32)
CREAT SERPL-MCNC: 0.62 MG/DL (ref 0.6–1)
EGFRCR SERPLBLD CKD-EPI 2021: 95 ML/MIN/1.73M2
GLUCOSE SERPL-MCNC: 87 MG/DL (ref 65–99)
HDLC SERPL-MCNC: 77 MG/DL
LDLC SERPL CALC-MCNC: 60 MG/DL (CALC)
MAGNESIUM SERPL-MCNC: 2.2 MG/DL (ref 1.5–2.5)
NONHDLC SERPL-MCNC: 76 MG/DL (CALC)
POTASSIUM SERPL-SCNC: 4 MMOL/L (ref 3.5–5.3)
PROT SERPL-MCNC: 6.7 G/DL (ref 6.1–8.1)
SODIUM SERPL-SCNC: 140 MMOL/L (ref 135–146)
T4 FREE SERPL-MCNC: 1.1 NG/DL (ref 0.8–1.8)
TRIGL SERPL-MCNC: 76 MG/DL
TSH SERPL-ACNC: 0.96 MIU/L (ref 0.4–4.5)

## 2025-03-09 LAB
AORTIC VALVE MEAN GRADIENT: 9 MMHG
AORTIC VALVE PEAK VELOCITY: 1.93 M/S
AV PEAK GRADIENT: 15 MMHG
AVA (PEAK VEL): 1.77 CM2
AVA (VTI): 2.07 CM2
EJECTION FRACTION APICAL 4 CHAMBER: 66
EJECTION FRACTION: 68 %
LEFT VENTRICLE INTERNAL DIMENSION DIASTOLE: 3.8 CM (ref 3.5–6)
LEFT VENTRICULAR OUTFLOW TRACT DIAMETER: 2.23 CM
LV EJECTION FRACTION BIPLANE: 59 %
MITRAL VALVE E/A RATIO: 1.04
RIGHT VENTRICLE FREE WALL PEAK S': 11.46 CM/S
RIGHT VENTRICLE PEAK SYSTOLIC PRESSURE: 31.4 MMHG
TRICUSPID ANNULAR PLANE SYSTOLIC EXCURSION: 2.2 CM

## 2025-03-11 ENCOUNTER — APPOINTMENT (OUTPATIENT)
Dept: PRIMARY CARE | Facility: CLINIC | Age: 70
End: 2025-03-11
Payer: MEDICARE

## 2025-03-11 ENCOUNTER — APPOINTMENT (OUTPATIENT)
Dept: CARDIOLOGY | Facility: CLINIC | Age: 70
End: 2025-03-11
Payer: MEDICARE

## 2025-03-11 VITALS
WEIGHT: 195.4 LBS | HEART RATE: 85 BPM | BODY MASS INDEX: 36.89 KG/M2 | SYSTOLIC BLOOD PRESSURE: 114 MMHG | HEIGHT: 61 IN | DIASTOLIC BLOOD PRESSURE: 70 MMHG

## 2025-03-11 VITALS
OXYGEN SATURATION: 97 % | HEIGHT: 61 IN | RESPIRATION RATE: 16 BRPM | DIASTOLIC BLOOD PRESSURE: 72 MMHG | BODY MASS INDEX: 36.82 KG/M2 | HEART RATE: 72 BPM | SYSTOLIC BLOOD PRESSURE: 124 MMHG | WEIGHT: 195 LBS | TEMPERATURE: 97.6 F

## 2025-03-11 DIAGNOSIS — F41.9 ANXIETY: Primary | ICD-10-CM

## 2025-03-11 DIAGNOSIS — I10 PRIMARY HYPERTENSION: ICD-10-CM

## 2025-03-11 DIAGNOSIS — E05.00 GRAVES' DISEASE: ICD-10-CM

## 2025-03-11 DIAGNOSIS — Z71.2 ENCOUNTER TO DISCUSS TEST RESULTS: ICD-10-CM

## 2025-03-11 DIAGNOSIS — R73.9 HYPERGLYCEMIA: ICD-10-CM

## 2025-03-11 DIAGNOSIS — Z79.899 MEDICATION COURSE CHANGED: ICD-10-CM

## 2025-03-11 DIAGNOSIS — E05.90 HYPERTHYROIDISM: ICD-10-CM

## 2025-03-11 DIAGNOSIS — E78.2 MIXED HYPERLIPIDEMIA: ICD-10-CM

## 2025-03-11 LAB — POC HEMOGLOBIN A1C: 5.4 % (ref 4.2–6.5)

## 2025-03-11 PROCEDURE — 1160F RVW MEDS BY RX/DR IN RCRD: CPT | Performed by: FAMILY MEDICINE

## 2025-03-11 PROCEDURE — G2211 COMPLEX E/M VISIT ADD ON: HCPCS | Performed by: INTERNAL MEDICINE

## 2025-03-11 PROCEDURE — 3078F DIAST BP <80 MM HG: CPT | Performed by: FAMILY MEDICINE

## 2025-03-11 PROCEDURE — 1160F RVW MEDS BY RX/DR IN RCRD: CPT | Performed by: INTERNAL MEDICINE

## 2025-03-11 PROCEDURE — 3074F SYST BP LT 130 MM HG: CPT | Performed by: INTERNAL MEDICINE

## 2025-03-11 PROCEDURE — 99214 OFFICE O/P EST MOD 30 MIN: CPT | Performed by: FAMILY MEDICINE

## 2025-03-11 PROCEDURE — 99214 OFFICE O/P EST MOD 30 MIN: CPT | Performed by: INTERNAL MEDICINE

## 2025-03-11 PROCEDURE — 3078F DIAST BP <80 MM HG: CPT | Performed by: INTERNAL MEDICINE

## 2025-03-11 PROCEDURE — 1036F TOBACCO NON-USER: CPT | Performed by: FAMILY MEDICINE

## 2025-03-11 PROCEDURE — 1159F MED LIST DOCD IN RCRD: CPT | Performed by: FAMILY MEDICINE

## 2025-03-11 PROCEDURE — 1036F TOBACCO NON-USER: CPT | Performed by: INTERNAL MEDICINE

## 2025-03-11 PROCEDURE — 3074F SYST BP LT 130 MM HG: CPT | Performed by: FAMILY MEDICINE

## 2025-03-11 PROCEDURE — 3008F BODY MASS INDEX DOCD: CPT | Performed by: FAMILY MEDICINE

## 2025-03-11 PROCEDURE — 1159F MED LIST DOCD IN RCRD: CPT | Performed by: INTERNAL MEDICINE

## 2025-03-11 PROCEDURE — 83036 HEMOGLOBIN GLYCOSYLATED A1C: CPT | Performed by: FAMILY MEDICINE

## 2025-03-11 PROCEDURE — 3008F BODY MASS INDEX DOCD: CPT | Performed by: INTERNAL MEDICINE

## 2025-03-11 RX ORDER — VALSARTAN 80 MG/1
80 TABLET ORAL DAILY
Qty: 90 TABLET | Refills: 0 | Status: SHIPPED | OUTPATIENT
Start: 2025-03-11 | End: 2025-06-09

## 2025-03-11 RX ORDER — AMLODIPINE BESYLATE 2.5 MG/1
2.5 TABLET ORAL DAILY
Start: 2025-03-11 | End: 2025-03-14 | Stop reason: SDUPTHER

## 2025-03-11 RX ORDER — CALCIUM CARBONATE 300MG(750)
400 TABLET,CHEWABLE ORAL DAILY
COMMUNITY

## 2025-03-11 NOTE — PROGRESS NOTES
Covid vax: x 3  Flu: declined  Pneumo: UTD  RSV: advised  Shingles: advised    CRC: due 2033  Mammogram: 8/2024  Pap: hysterectomy  Lmp: hysterectomy

## 2025-03-11 NOTE — PROGRESS NOTES
70-year-old with primary hypertension mixed hyperlipidemia shortness of breath pulmonary hypertension systolic heart murmur presents for 1 year follow-up.  Subjective :   Overall she feels much better now that her thyroid status is normal  [place] for follow-up.  Interval review of systems is negative for chest discomfort pressure tightness heaviness palpitations lightheadedness orthopnea paroxysmal nocturnal dyspnea dependent edema or claudication TIA or CVA type symptoms or bleeding diathesis    12 point ROS is negative or non contributory except as noted.       History so Far :  1. Hypertension  2. Palpitations  3. Elevated resting heart rate  4. Dizziness  5. Not orthostatic  6. Hyperlipidemia  7. Hirsutism  8. Hypokalemia, consider hyperaldosteronism.  9. Unintentional weight loss  10. Systolic murmur left sternal border with increased gradient across the aortic valve/LVOT without significant aortic stenosis.  11. Cholelithiasis without cholecystitis-ultrasound August 2023, normal liver  12. Echocardiogram August 2023-LVEF 60 to 65% indeterminate diastolic function mild septal hypertrophy without evidence of significant outflow tract obstruction mild mitral insufficiency mild tricuspid insufficiency RVSP 40 mmHg calcification of aortic valvular tips significant gradient through the aortic valve peak 31 mean 20 left atrial diameter 3.1 left atrial volume index 22, ascending aorta 3 cm, LV wall thickness 0.8 cm, dimensionless index of the aortic valve 0.49  13.  Labile hypertension, whitecoat hypertension, orthostatic hypotension  14.  Echocardiogram March 2025-LVEF 65 to 70% grade 1 diastolic dysfunction asymmetric septal hypertrophy without dynamic LVOT obstruction left atrial diameter 4.2 cm trace to mild mitral regurgitation trace to mild tricuspid regurgitation RVSP 31 mmHg calcification of the aortic valve in particular the right  "coronary cusp without aortic stenosis calcification in the proximal ascending aorta study from August 2023 reported peak and mean gradients across the aortic valve to be 31 and 20, it is lower on the present study.  RVSP has decreased from 40 mmHg then to 31 mmHg now.      Objective   Wt Readings from Last 3 Encounters:   03/11/25 88.6 kg (195 lb 6.4 oz)   01/21/25 87.1 kg (192 lb)   10/23/24 86.6 kg (191 lb)            Vitals:    03/11/25 1227   BP: 114/70   BP Location: Right arm   Patient Position: Sitting   Pulse: 85   Weight: 88.6 kg (195 lb 6.4 oz)   Height: 1.549 m (5' 1\")                Physical Exam:    GENERAL APPEARANCE: in no acute distress.  CHEST: Symmetric and non-tender.  INTEGUMENT: Skin warm and dry  HEENT: No gross abnormalities identified.No pallor or scleral icterus.  NECK: Supple, no JVD, no bruit.   NEURO/PSHCY: Alert and oriented x3; appropriate behavior and responses and responses  LUNGS: Clear to auscultation bilaterally; normal respiratory effort.  HEART: Rate and rhythm regular with grade 2/6 crescendo decrescendo murmur along the left sternal border no gallop appreciated.   ABDOMEN: Soft, non tender.  MUSCULOSKELETAL: No gross deformities.  EXTREMITIES: Warm  There is no edema noted.    Meds:  Current Outpatient Medications   Medication Instructions    ALPRAZolam (XANAX) 0.25 mg, oral, 3 times daily PRN    amLODIPine (NORVASC) 2.5 mg, oral, Daily    atorvastatin (LIPITOR) 40 mg, oral, Nightly    cholecalciferol (Vitamin D-3) 50 mcg (2,000 unit) capsule 1 capsule, Daily    magnesium oxide (MAG-OX) 400 mg, Daily    methIMAzole (TAPAZOLE) 2.5 mg, oral, Every Day, 4 days a week    tamoxifen (NOLVADEX) 20 mg, oral, Daily, Take with water or any other nonalcoholic drink with or without food at around the same time(s) every day.    valsartan (DIOVAN) 80 mg, oral, Daily          Allergies   Allergen Reactions    Codeine Other     uncontrollable giddiness    Spironolactone Dizziness " "            LABS:    Lipids:  Lab Results   Component Value Date    CHOL 153 03/07/2025    CHOL 177 10/23/2024    CHOL 130 08/03/2023     Lab Results   Component Value Date    HDL 77 03/07/2025    HDL 80.7 10/23/2024    HDL 50.6 08/03/2023     Lab Results   Component Value Date    LDLCALC 60 03/07/2025    LDLCALC 78 10/23/2024     Lab Results   Component Value Date    TRIG 76 03/07/2025    TRIG 92 10/23/2024    TRIG 74 08/03/2023     No components found for: \"CHOLHDL\"  A1C  Lab Results   Component Value Date    HGBA1C 5.2 10/23/2024     CBC          Free T41.1 March 2025 TSH 0.96 March 2025 sodium 140 potassium 4 liver enzymes normal BUN 9 creatinine 0.62        Patient Active Problem List    Diagnosis Date Noted    Medication course changed 03/11/2025    Orthostatic hypotension 03/26/2024    Biliary dyskinesia 02/22/2024    Graves' disease 02/02/2024    Encounter to discuss test results 11/08/2023    Abnormal thyroid blood test 11/08/2023    Elevated alkaline phosphatase level 11/08/2023    Tachycardia 09/30/2023    Palpitations 09/30/2023    Other microscopic hematuria 09/30/2023    Numbness in left leg 09/30/2023    Nausea in adult 09/30/2023    Hip pain, left 09/30/2023    Heart murmur, systolic 09/30/2023    Fibroadenoma of right breast 09/30/2023    Fatigue 09/30/2023    Early satiety 09/30/2023    Dizziness 09/30/2023    Anosmia 09/30/2023    Weight loss 09/28/2023    Hyperthyroidism 09/20/2023    Gallstones 09/20/2023    Anxiety 07/20/2023    Ductal carcinoma in situ (DCIS) of right breast 07/20/2023    Primary hypertension 07/20/2023    Hyperglycemia 07/20/2023    Mixed hyperlipidemia 07/20/2023                 Assessment:    1. Primary hypertension  Follow Up In Cardiology    amLODIPine (Norvasc) 2.5 mg tablet    Follow Up In Cardiology    valsartan (Diovan) 80 mg tablet      2. Mixed hyperlipidemia  Follow Up In Cardiology    Follow Up In Cardiology      3. Hyperthyroidism  Follow Up In Cardiology    " Follow Up In Cardiology      4. Encounter to discuss test results  Follow Up In Cardiology      5. Medication course changed        Clinical decision making:  She had already looked up her echo report and was concerned about some of the details  Echo findings were reviewed.  She has calcification of the aortic valve but she does not have aortic stenosis.  Gradients across the aortic valve are lower compared to echo from a year ago, she does have some LVH.  Given that she has LVH, may be beneficial to initiate an angiotensin receptor blocker and wean off the amlodipine.  I suggested Diovan 80 mg p.o. daily, discussed potential side effects such as dry cough and angioedema, she was not taking the entire time, and this discussion did bring out some anxiety.  If she does not want to change the antihypertensive regimen that is fine with me especially if she is not able to return for follow-up either with myself or with Dr. Emanuel.  Will decrease the amlodipine to 2.5 mg daily, and discontinue after 2 weeks..  This is because the Diovan may take few weeks to have maximum effect.  If blood pressure drops  to 100mm Hg  or below or if she develops lightheadedness she can stop amlodipine.  Lipid profile is at target  Thyroid status is at target, no longer having palpitations.    Follow up : 1 year        Scribe Attestation  By signing my name below, I, Odette Morrison MD, Scribe   attest that this documentation has been prepared under the direction and in the presence of Odette Morrison MD.     All medical record entries made by the Scribe were at my direction and personally dictated by me. I have reviewed the chart and agree that the record accurately reflects my personal performance of the history, physical exam, discussion and plan.

## 2025-03-11 NOTE — PROGRESS NOTES
"Subjective   Patient ID: Marisol Weiner is a 70 y.o. female who presents for FOLLOW UP VISIT.  Ldl 60  Sees dr mccarty  Changing meds  Check sugars today  Scopes utd    HPI  Patient Active Problem List   Diagnosis    Anxiety    Ductal carcinoma in situ (DCIS) of right breast    Primary hypertension    Hyperglycemia    Mixed hyperlipidemia    Hyperthyroidism    Gallstones    Weight loss    Tachycardia    Palpitations    Other microscopic hematuria    Numbness in left leg    Nausea in adult    Hip pain, left    Heart murmur, systolic    Fibroadenoma of right breast    Fatigue    Early satiety    Dizziness    Anosmia    Encounter to discuss test results    Abnormal thyroid blood test    Elevated alkaline phosphatase level    Graves' disease    Biliary dyskinesia    Orthostatic hypotension    Medication course changed       Past Surgical History:   Procedure Laterality Date    BREAST BIOPSY Left 09/2020    fibroadenoma    BREAST BIOPSY Right 06/2021    ductal carcinoma in situ    COLONOSCOPY  01/2023    no polyps-due 2033    HYSTERECTOMY  1990       Review of Systems  This patient has  NO history of seizures/ CAD or CVA    NO history of recent Covid nor flu symptoms,    NO Fever nor chills today,    NO Chest pain, occ chronic shortness of breath no paroxysmal nocturnal dyspnea,  NO Nausea, vomiting, nor diarrhea,  NO Hematochezia nor melena,  NO Dysuria, hematuria, nor new incontinence issues  NO new severe headaches nor neurological complaints,  NO new issues with anxiety nor depression nor new psychiatric complaints,  NO suicidal nor homicidal ideations.     OBJECTIVE:  /72   Pulse 72   Temp 36.4 °C (97.6 °F) (Temporal)   Resp 16   Ht 1.549 m (5' 1\")   Wt 88.5 kg (195 lb)   LMP 01/01/1990 (Approximate)   SpO2 97%   BMI 36.84 kg/m²      General:  alert, oriented, no acute distress.  No obvious skin rashes noted.   No gait disturbance noted/baseline gait.    Mood is pleasant,  no signs of emotional " distress.   Not appearing intoxicated or altered.   No voiced delusions,   Normal, appropriate behavior.    HEENT: Normocephalic, atraumatic,   Pupils round, reactive to light  Extraocular motions intact and wnl  Tympanic membranes normal    Neck: no nuchal rigidity  No masses palpable.  No carotid bruits.  No thyromegaly.    Respiratory: Equal breath sounds  No wheezes,    rales,    nor rhonchi  No respiratory distress.    Heart: Regular rate and rhythm, 2/6 sys   murmurs  no rubs/gallops    Abdomen: no masses palpable, nontender, no rebound nor guarding.  ovwt  Extremities: NO cyanosis noted, no clubbing.   No edema noted.  2+dorsalis pedis pulses.    Normal-not antalgic, steady gait.    Orders Only on 03/06/2025   Component Date Value Ref Range Status    GLUCOSE 03/07/2025 87  65 - 99 mg/dL Final    Comment:               Fasting reference interval         UREA NITROGEN (BUN) 03/07/2025 9  7 - 25 mg/dL Final    CREATININE 03/07/2025 0.62  0.60 - 1.00 mg/dL Final    EGFR 03/07/2025 95  > OR = 60 mL/min/1.73m2 Final    SODIUM 03/07/2025 140  135 - 146 mmol/L Final    POTASSIUM 03/07/2025 4.0  3.5 - 5.3 mmol/L Final    CHLORIDE 03/07/2025 106  98 - 110 mmol/L Final    CARBON DIOXIDE 03/07/2025 27  20 - 32 mmol/L Final    ELECTROLYTE BALANCE 03/07/2025 7  7 - 17 mmol/L (calc) Final    CALCIUM 03/07/2025 8.9  8.6 - 10.4 mg/dL Final    PROTEIN, TOTAL 03/07/2025 6.7  6.1 - 8.1 g/dL Final    ALBUMIN 03/07/2025 3.8  3.6 - 5.1 g/dL Final    BILIRUBIN, TOTAL 03/07/2025 0.5  0.2 - 1.2 mg/dL Final    ALKALINE PHOSPHATASE 03/07/2025 59  37 - 153 U/L Final    AST 03/07/2025 13  10 - 35 U/L Final    ALT 03/07/2025 13  6 - 29 U/L Final    TSH 03/07/2025 0.96  0.40 - 4.50 mIU/L Final    T4, FREE 03/07/2025 1.1  0.8 - 1.8 ng/dL Final    CHOLESTEROL, TOTAL 03/07/2025 153  <200 mg/dL Final    HDL CHOLESTEROL 03/07/2025 77  > OR = 50 mg/dL Final    TRIGLYCERIDES 03/07/2025 76  <150 mg/dL Final    LDL-CHOLESTEROL 03/07/2025 60  mg/dL  (calc) Final    Comment: Reference range: <100     Desirable range <100 mg/dL for primary prevention;    <70 mg/dL for patients with CHD or diabetic patients   with > or = 2 CHD risk factors.     LDL-C is now calculated using the Musa   calculation, which is a validated novel method providing   better accuracy than the Friedewald equation in the   estimation of LDL-C.   Troy SALAZAR et al. MARCO. 2013;310(19): 7568-9260   (http://bideo.com.[employer]/faq/BVB315)      CHOL/HDLC RATIO 03/07/2025 2.0  <5.0 (calc) Final    NON HDL CHOLESTEROL 03/07/2025 76  <130 mg/dL (calc) Final    Comment: For patients with diabetes plus 1 major ASCVD risk   factor, treating to a non-HDL-C goal of <100 mg/dL   (LDL-C of <70 mg/dL) is considered a therapeutic   option.      MAGNESIUM 03/07/2025 2.2  1.5 - 2.5 mg/dL Final   Ancillary Procedure on 03/06/2025   Component Date Value Ref Range Status    AV mn grad 03/06/2025 9  mmHg Final    AV pk marcela 03/06/2025 1.93  m/s Final    LV Biplane EF 03/06/2025 59  % Final    LVOT diam 03/06/2025 2.23  cm Final    MV E/A ratio 03/06/2025 1.04   Final    Tricuspid annular plane systolic e* 03/06/2025 2.2  cm Final    LV EF 03/06/2025 68  % Final    RV free wall pk S' 03/06/2025 11.46  cm/s Final    RVSP 03/06/2025 31.4  mmHg Final    LVIDd 03/06/2025 3.80  cm Final    Aortic Valve Area by Continuity of* 03/06/2025 1.77  cm2 Final    AV pk grad 03/06/2025 15  mmHg Final    Aortic Valve Area by Continuity of* 03/06/2025 2.07  cm2 Final    LV A4C EF 03/06/2025 66.0   Final   Lab on 01/21/2025   Component Date Value Ref Range Status    TSI 01/21/2025 359 (H)  <140 % baseline Final    Comment:    Thyroid stimulating immunoglobulins (TSI) can engage  the TSH receptors resulting in hyperthyroidism in  Graves' disease patients. TSI levels can be useful in  monitoring the clinical outcome of Graves' disease as  well as assessing the potential for hyperthyroidism  from maternal-fetal  transfer. TSI results greater than  or equal to (>=) 140% of the Reference Control are  considered positive.     NOTE:  A serum TSH level greater than 350 micro-International  Units/mL can interfere with the TSI bioassay and  potentially give false positive results.     Patients who are pregnant and are suspected of having  hyperthyroidism should have both TSI and human  Chorionic Gonadotropin(hCG) tests measured. A serum  hCG level greater than 40,625 mIU/mL can interfere  with the TSI bioassay and may give false negative  results. In these patients it is recommended that  a second TSI be obtained when the hCG concentration  falls below 40,625 mIU/mL (usually after approximately  20-weeks                            gestation).     The analytical performance characteristics of this  assay have been determined by [employer], [place], [place].  The modifications  have not been cleared or approved by the FDA.  This  assay has been validated pursuant to the CLIA  regulations and is used for clinical purposes.           Thyroid Stimulating Hormone 01/21/2025 0.94  0.44 - 3.98 mIU/L Final    Thyroxine, Free 01/21/2025 0.75  0.61 - 1.12 ng/dL Final    Glucose 01/21/2025 89  74 - 99 mg/dL Final    Sodium 01/21/2025 141  136 - 145 mmol/L Final    Potassium 01/21/2025 4.1  3.5 - 5.3 mmol/L Final    Chloride 01/21/2025 107  98 - 107 mmol/L Final    Bicarbonate 01/21/2025 28  21 - 32 mmol/L Final    Anion Gap 01/21/2025 10  10 - 20 mmol/L Final    Urea Nitrogen 01/21/2025 13  6 - 23 mg/dL Final    Creatinine 01/21/2025 0.61  0.50 - 1.05 mg/dL Final    eGFR 01/21/2025 >90  >60 mL/min/1.73m*2 Final    Calculations of estimated GFR are performed using the 2021 CKD-EPI Study Refit equation without the race variable for the IDMS-Traceable creatinine methods.  https://jasn.asnjournals.org/content/early/2021/09/22/ASN.5897768533    Calcium 01/21/2025 8.6  8.6 - 10.3 mg/dL Final    Albumin 01/21/2025 3.9  3.4 -  5.0 g/dL Final    Alkaline Phosphatase 01/21/2025 62  33 - 136 U/L Final    Total Protein 01/21/2025 6.8  6.4 - 8.2 g/dL Final    AST 01/21/2025 13  9 - 39 U/L Final    Bilirubin, Total 01/21/2025 0.5  0.0 - 1.2 mg/dL Final    ALT 01/21/2025 12  7 - 45 U/L Final    Patients treated with Sulfasalazine may generate falsely decreased results for ALT.        Assessment/Plan     Problem List Items Addressed This Visit       Anxiety - Primary    Primary hypertension    Relevant Orders    Follow Up In [facility] - PCP - Medicare Annual    Hyperglycemia    Relevant Orders    POCT glycosylated hemoglobin (Hb A1C) manually resulted    Mixed hyperlipidemia    Relevant Orders    Follow Up In [facility] - PCP - Medicare Annual    Hyperthyroidism    Relevant Orders    Follow Up In [facility] - PCP - Medicare Annual    Graves' disease    Relevant Orders    Follow Up In [facility] - PCP - Medicare Annual     Check sugars  Saw CARDIO  Tapering norvasc after 2wks  Ascvd 8.8%  Will call with how she feels in 1mo  The patient is aware that results will be forthcoming of ALL planned labs and or tests. If no results are received on my chart or by letter within 1 - 3 weeks, the patient is aware they need to call to obtain results, as this is not usual. Also, if any new conditions arise, or current condition worsens, it is understood that sooner appointment should be made or urgent care/convenient care or emergency room treatment should be sought depending on severity. Otherwise follow up for recheck at regular intervals as we have discussed, at least yearly.    Shingles advised  Advised to add exercise regimen d/w pt  4mo or so appt      Follow up at next scheduled visit -as planned or directed today.  Sooner if new or unresolved issues of concern.

## 2025-03-11 NOTE — PATIENT INSTRUCTIONS
ASK DR. HARTLEY ABOUT GLP-1 MEDICATIONS- a class of drugs that help control blood sugar and manage weight     START VALSARTAN 80 MG DAILY   DECREASE AMLODIPINE 2.5 MG DAILY   PLEASE CALL DR. PADRON WILL ANY QUESTIONS OR CONCERNS OF MEDICATION.        DID YOU KNOW  We have a pharmacy here in the [facility].  They can fill all prescriptions, not just cardiac medications.  Prescriptions from other pharmacies can easily be transferred to the  pharmacy by the  pharmacist on site.   pharmacies offer FREE HOME DELIVERY on medications to anywhere in [place]. They can sync your medications. Typically prescriptions can be ready in 10 - 15 minutes. If pharmacy is unable to fill your  prescription or if cost is more than your paying now the Pharmacist can easily transfer back to your Pharmacy of choice. Pharmacy phone # [phone].      Please bring all medicines, vitamins, and herbal supplements with you in original bottles to every appointment  Prescriptions will not be filled unless you are compliant with your follow up appointments or have a follow up appointment scheduled as per instruction of your physician.   Refills should be requested at the time of your visit.

## 2025-03-13 DIAGNOSIS — I10 PRIMARY HYPERTENSION: ICD-10-CM

## 2025-03-13 RX ORDER — AMLODIPINE BESYLATE 2.5 MG/1
2.5 TABLET ORAL DAILY
Qty: 90 TABLET | Refills: 3 | OUTPATIENT
Start: 2025-03-13 | End: 2026-03-13

## 2025-03-13 NOTE — TELEPHONE ENCOUNTER
Medication refused due to failing protocol.    Requested Prescriptions   Pending Prescriptions Disp Refills    amLODIPine (Norvasc) 2.5 mg tablet 90 tablet 3     Sig: Take 1 tablet (2.5 mg) by mouth once daily.        Calcium-Channel Blockers Protocol Failed - 3/13/2025  3:00 PM        Failed - Medication not refilled in past 45 days (1.5 months)     Matching medication order placed on 3/11/2025 12:58 PM  Order [id number]: amLODIPine (Norvasc) 2.5 mg tablet  (For orders placed between 1/27/2025  3:00 PM and 3/13/2025  3:00 PM)            Failed - BP under 140/90 in past year or if patient has diabetes, CAD, or PVD, history of stroke or MI, BP under 130/80 in past year     BP Readings from Last 3 Encounters:   03/11/25 124/72   03/11/25 114/70   01/21/25 111/70               Passed - No active pregnancy on record        Passed - No pregnancy test in the past 12 months or most recent test was negative        Passed - Visit with relevant provider in past 12 months or upcoming 90 days     Recent Visits  Date Type Provider Dept   03/11/25 Office Visit Mona Emanuel MD [id number]   03/11/25 Office Visit Odette Morrison MD Do [id number]   10/23/24 Office Visit Mona Emanuel MD Do [id number]   05/14/24 Office Visit Mona Emanuel MD Do [id number]   [id number]   Showing recent visits within past 365 days and meeting all other requirements  Future Appointments  No visits were found meeting these conditions.  Showing future appointments within next 90 days and meeting all other requirements              Passed - Normal serum creatinine in past 12     Creatinine   Date Value Ref Range Status   01/21/2025 0.61 0.50 - 1.05 mg/dL Final

## 2025-03-13 NOTE — TELEPHONE ENCOUNTER
Pt left [facility] stating that she was in to see Dr. Morrison yesterday and Amlodipine dose was decreased. Pt requesting script be sent to pharmacy.    E-scribed to [facility].

## 2025-03-14 RX ORDER — AMLODIPINE BESYLATE 2.5 MG/1
2.5 TABLET ORAL DAILY
Qty: 90 TABLET | Refills: 0 | Status: SHIPPED | OUTPATIENT
Start: 2025-03-14 | End: 2025-06-12

## 2025-04-21 ENCOUNTER — LAB (OUTPATIENT)
Dept: LAB | Facility: HOSPITAL | Age: 70
End: 2025-04-21
Payer: MEDICARE

## 2025-04-21 DIAGNOSIS — M81.6 LOCALIZED OSTEOPOROSIS WITHOUT CURRENT PATHOLOGICAL FRACTURE: ICD-10-CM

## 2025-04-21 DIAGNOSIS — E05.00 GRAVES' DISEASE: ICD-10-CM

## 2025-04-23 LAB
25(OH)D3+25(OH)D2 SERPL-MCNC: 39 NG/ML (ref 30–100)
ALBUMIN SERPL-MCNC: 4 G/DL (ref 3.6–5.1)
BUN SERPL-MCNC: 11 MG/DL (ref 7–25)
BUN/CREAT SERPL: NORMAL (CALC) (ref 6–22)
CALCIUM SERPL-MCNC: 8.9 MG/DL (ref 8.6–10.4)
CHLORIDE SERPL-SCNC: 107 MMOL/L (ref 98–110)
CO2 SERPL-SCNC: 25 MMOL/L (ref 20–32)
CREAT SERPL-MCNC: 0.63 MG/DL (ref 0.6–1)
EGFRCR SERPLBLD CKD-EPI 2021: 94 ML/MIN/1.73M2
GLUCOSE SERPL-MCNC: 88 MG/DL (ref 65–99)
PHOSPHATE SERPL-MCNC: 3.1 MG/DL (ref 2.1–4.3)
POTASSIUM SERPL-SCNC: 4.2 MMOL/L (ref 3.5–5.3)
PTH-INTACT SERPL-MCNC: 54 PG/ML (ref 16–77)
SODIUM SERPL-SCNC: 143 MMOL/L (ref 135–146)
T3 SERPL-MCNC: 120 NG/DL (ref 76–181)
T4 FREE SERPL-MCNC: 1.1 NG/DL (ref 0.8–1.8)
TSH SERPL-ACNC: 1.83 MIU/L (ref 0.4–4.5)
TSI SER-ACNC: 316 % BASELINE

## 2025-04-28 ENCOUNTER — DOCUMENTATION (OUTPATIENT)
Dept: ENDOCRINOLOGY | Facility: HOSPITAL | Age: 70
End: 2025-04-28
Payer: MEDICARE

## 2025-04-28 NOTE — PROGRESS NOTES
Lab results reviewed. Continue MMI 2.5 mg 4 days a week.      Latest Reference Range & Units 04/21/25 13:12   GLUCOSE 65 - 99 mg/dL 88   SODIUM 135 - 146 mmol/L 143   POTASSIUM 3.5 - 5.3 mmol/L 4.2   CHLORIDE 98 - 110 mmol/L 107   CARBON DIOXIDE 20 - 32 mmol/L 25   UREA NITROGEN (BUN) 7 - 25 mg/dL 11   CREATININE 0.60 - 1.00 mg/dL 0.63   EGFR > OR = 60 mL/min/1.73m2 94   BUN/CREATININE RATIO 6 - 22 (calc) SEE NOTE:   CALCIUM 8.6 - 10.4 mg/dL 8.9   PHOSPHATE (AS PHOSPHORUS) 2.1 - 4.3 mg/dL 3.1   ALBUMIN 3.6 - 5.1 g/dL 4.0   PARATHYROID HORMONE, INTACT 16 - 77 pg/mL 54   T3, TOTAL 76 - 181 ng/dL 120   T4, FREE 0.8 - 1.8 ng/dL 1.1   TSH 0.40 - 4.50 mIU/L 1.83   VITAMIN D,25-OH,TOTAL,IA 30 - 100 ng/mL 39

## 2025-05-07 ENCOUNTER — OFFICE VISIT (OUTPATIENT)
Dept: SURGICAL ONCOLOGY | Facility: CLINIC | Age: 70
End: 2025-05-07
Payer: MEDICARE

## 2025-05-07 ENCOUNTER — HOSPITAL ENCOUNTER (OUTPATIENT)
Dept: RADIOLOGY | Facility: CLINIC | Age: 70
Discharge: HOME | End: 2025-05-07
Payer: MEDICARE

## 2025-05-07 VITALS
BODY MASS INDEX: 37.77 KG/M2 | HEART RATE: 81 BPM | TEMPERATURE: 97.9 F | SYSTOLIC BLOOD PRESSURE: 138 MMHG | DIASTOLIC BLOOD PRESSURE: 84 MMHG | OXYGEN SATURATION: 96 % | WEIGHT: 199.8 LBS

## 2025-05-07 VITALS — WEIGHT: 195.11 LBS | BODY MASS INDEX: 36.84 KG/M2 | HEIGHT: 61 IN

## 2025-05-07 DIAGNOSIS — N64.4 BREAST PAIN: ICD-10-CM

## 2025-05-07 DIAGNOSIS — Z85.3 ENCOUNTER FOR FOLLOW-UP SURVEILLANCE OF BREAST CANCER: Primary | ICD-10-CM

## 2025-05-07 DIAGNOSIS — Z08 ENCOUNTER FOR FOLLOW-UP SURVEILLANCE OF BREAST CANCER: Primary | ICD-10-CM

## 2025-05-07 PROCEDURE — 99213 OFFICE O/P EST LOW 20 MIN: CPT | Performed by: NURSE PRACTITIONER

## 2025-05-07 PROCEDURE — 1126F AMNT PAIN NOTED NONE PRSNT: CPT | Performed by: NURSE PRACTITIONER

## 2025-05-07 PROCEDURE — 77062 BREAST TOMOSYNTHESIS BI: CPT

## 2025-05-07 PROCEDURE — 1159F MED LIST DOCD IN RCRD: CPT | Performed by: NURSE PRACTITIONER

## 2025-05-07 PROCEDURE — 3075F SYST BP GE 130 - 139MM HG: CPT | Performed by: NURSE PRACTITIONER

## 2025-05-07 PROCEDURE — 3079F DIAST BP 80-89 MM HG: CPT | Performed by: NURSE PRACTITIONER

## 2025-05-07 ASSESSMENT — PAIN SCALES - GENERAL: PAINLEVEL_OUTOF10: 0-NO PAIN

## 2025-05-07 ASSESSMENT — ENCOUNTER SYMPTOMS
NEUROLOGICAL NEGATIVE: 1
HEMATOLOGIC/LYMPHATIC NEGATIVE: 1
CARDIOVASCULAR NEGATIVE: 1
MUSCULOSKELETAL NEGATIVE: 1
PSYCHIATRIC NEGATIVE: 1
CONSTITUTIONAL NEGATIVE: 1
ENDOCRINE NEGATIVE: 1
EYE PROBLEMS: 1
GASTROINTESTINAL NEGATIVE: 1
RESPIRATORY NEGATIVE: 1

## 2025-05-07 NOTE — PROGRESS NOTES
[facility]  Marisol Weiner female   1955 70 y.o.   [id number]      Chief Complaint  Follow up annual mammogram and exam, right breast pain, history right breast cancer.    History Of Present Illness  Marisol Weiner is a pleasant 70 y.o. AA female s/p right breast excisional biopsy on 2021 for low grade ductal carcinoma (DCIS), Er/CO+ involving a fibroadenoma, margins negative. Lateral margin 1 mm, all others > 2 mm. Case reviewed at tumor board- given encapsulated FA with low grade DCIS, no margin excision or radiation recommended. She started Arimidex then discontinued due to issues with bone density. She is now on Tamoxifen and tolerating it well. She has a personal history of hyperthyroidism and is under the care of endocrinology. She has family history of breast cancer in her mother. She presents today for annual mammogram and exam. She was having some right breast discomfort that was new. She does wear under wire bras. She recently got new bras and the discomfort has resolved.    BREAST IMAGIN2024 Bilateral diagnostic mammogram, indicates BI-RADS Category 2.     REPRODUCTIVE HISTORY: menarche age 9, , first birth age 27, did not breastfeed, no OCP's, natural menopause age 48, no HRT, heterogeneously dense    FAMILY CANCER HISTORY:   Mother: Breast cancer     Review of Systems  Review of Systems   Constitutional: Negative.    HENT:  Negative.     Eyes:  Positive for eye problems.        Dry eyes   Respiratory: Negative.     Cardiovascular: Negative.    Gastrointestinal: Negative.    Endocrine: Negative.    Genitourinary: Negative.     Musculoskeletal: Negative.    Skin: Negative.    Neurological: Negative.    Hematological: Negative.    Psychiatric/Behavioral: Negative.          Past Medical History  She has a past medical history of Abnormal mammogram (2021), Anxiety, Asthma, Breast cancer (2022), Heart murmur, History of mammogram (2024), Hypertension,  and Hyperthyroidism.    Surgical History  She has a past surgical history that includes Colonoscopy (01/2023); Breast biopsy (Left, 09/2020); Hysterectomy (1990); and Breast biopsy (Right, 06/2021).    Family History  Cancer-related family history includes Breast cancer in her mother; Cancer in her mother.     Social History  She reports that she has never smoked. She has never been exposed to tobacco smoke. She has never used smokeless tobacco. She reports current alcohol use. She reports that she does not use drugs.    Allergies  Codeine and Spironolactone    Medications  Current Outpatient Medications   Medication Instructions    ALPRAZolam (XANAX) 0.25 mg, oral, 3 times daily PRN    amLODIPine (NORVASC) 2.5 mg, oral, Daily    atorvastatin (LIPITOR) 40 mg, oral, Nightly    cholecalciferol (Vitamin D-3) 50 mcg (2,000 unit) capsule 1 capsule, Daily    magnesium oxide (MAG-OX) 400 mg, Daily    methIMAzole (TAPAZOLE) 2.5 mg, oral, Every Day, 4 days a week    tamoxifen (NOLVADEX) 20 mg, oral, Daily, Take with water or any other nonalcoholic drink with or without food at around the same time(s) every day.    valsartan (DIOVAN) 80 mg, oral, Daily       Last Recorded Vitals  Vitals:    05/07/25 1054   BP: 138/84   Pulse: 81   Temp: 36.6 °C (97.9 °F)   SpO2: 96%          Physical Exam  Chest:         Patient is alert and oriented x3 and in a relaxed and appropriate mood. Her gait is steady and hand grasps are equal. Sclera is clear. The breasts are nearly symmetrical. Right breast has a well healed partial circumareolar incision. The tissue is soft without palpable abnormalities, discrete nodules or masses. The skin and nipples appear normal. There is no cervical, supraclavicular or axillary lymphadenopathy.       Relevant Results and Imaging    Study Result    Narrative & Impression   Interpreted By:  Manoj Pichardo,   STUDY:  BI MAMMO BILATERAL DIAGNOSTIC TOMOSYNTHESIS;  5/7/2025 10:38 am      ACCESSION  NUMBER(S):  [id number]      ORDERING CLINICIAN:  AYDEN GONZALEZ      INDICATION:  Annual exam with evaluation of intermittent focal pain in the right  axilla. History of right lumpectomy for low-grade DCIS. History of  bilateral benign breast biopsies.      ,N64.4 Mastodynia      COMPARISON:  08/05/2024, 07/20/2023, 07/14/2022      FINDINGS:  2D and tomosynthesis images were reviewed at 1 mm slice thickness.      Density:  The breasts are heterogeneously dense, which may obscure  small masses.      A radiopaque marker was placed on the skin at the site of the  patient's area of concern in the right axilla. No focal mammographic  abnormality is seen underneath the radiopaque marker. Stable  postsurgical changes in the upper outer right breast at anterior  depth. Biopsy markers in the upper outer right breast at anterior  depth and lower inner left breast at middle to posterior depth. No  suspicious masses or calcifications are identified.      IMPRESSION:  No mammographic evidence of malignancy. No imaging explanation for  patient's right breast pain. Clinical follow-up and continued annual  screening is recommended.      BI-RADS CATEGORY:  BI-RADS Category:  2 Benign.  Recommendation:  Clinical Follow-up and Continued Annual Screening.  Recommended Date:  Immediate.  Laterality:  Right.      For any future breast imaging appointments, please call [phone] ([phone]).          MACRO:  None      Signed by: Manoj Pichardo 5/7/2025 3:49 PM  Dictation workstation:   [id number]     I explained the results in depth, along with suggested explanation for follow up recommendations based on the testing results. BI-RADS Category 2      Orders  Orders Placed This Encounter   Procedures    BI mammo bilateral diagnostic tomosynthesis     Due June 13 2024     Standing Status:   Future     Expected Date:   5/8/2026     Expiration Date:   6/7/2026     Reason for exam::   hx right breast cancer     Radiologist to Determine Optimal  Study:   Yes     Release result to [facility]:   Immediate [1]     Is this exam part of a Research Study? If Yes, link this order to the research study:   No       Visit Diagnosis  1. Encounter for follow-up surveillance of breast cancer  Clinic Appointment Request Follow Up    BI mammo bilateral diagnostic tomosynthesis          Assessment  Breast cancer surveillance, normal clinical exam and imaging, hx right breast cancer, Tamoxifen, family history breast cancer, heterogeneously dense    Plan  Return May 2026 for bilateral diagnostic mammogram and office visit. Continue Tamoxifen.    Patient Discussion/Summary  Your clinical examination and imaging are normal. Please return in one year for mammogram and office visit or sooner if you have any problems or concerns.     You can see your health information, review clinical summaries from office visits & test results online when you follow your health with MY  Chart, a personal health record. To sign up go to [email or web address]. If you need assistance with signing up or trouble getting into your account call [facility] Patient Line 24/7 at [phone].    My office phone number is [phone] if you need to get in touch with me or have additional questions or concerns. Thank you for choosing [facility] and trusting me as your healthcare provider. I look forward to seeing you again at your next office visit. I am honored to be a provider on your health care team and I remain dedicated to helping you achieve your health goals.    Gerri Quijano, MANDA-CNP

## 2025-05-21 ENCOUNTER — APPOINTMENT (OUTPATIENT)
Dept: PRIMARY CARE | Facility: CLINIC | Age: 70
End: 2025-05-21
Payer: MEDICARE

## 2025-05-25 DIAGNOSIS — I10 PRIMARY HYPERTENSION: ICD-10-CM

## 2025-05-27 ENCOUNTER — APPOINTMENT (OUTPATIENT)
Dept: ENDOCRINOLOGY | Facility: CLINIC | Age: 70
End: 2025-05-27
Payer: MEDICARE

## 2025-05-27 VITALS
SYSTOLIC BLOOD PRESSURE: 137 MMHG | HEIGHT: 61 IN | HEART RATE: 79 BPM | BODY MASS INDEX: 38.14 KG/M2 | WEIGHT: 202 LBS | DIASTOLIC BLOOD PRESSURE: 83 MMHG

## 2025-05-27 DIAGNOSIS — E05.00 GRAVES' DISEASE: Primary | ICD-10-CM

## 2025-05-27 DIAGNOSIS — E05.90 HYPERTHYROIDISM: ICD-10-CM

## 2025-05-27 PROCEDURE — 3079F DIAST BP 80-89 MM HG: CPT | Performed by: INTERNAL MEDICINE

## 2025-05-27 PROCEDURE — 3008F BODY MASS INDEX DOCD: CPT | Performed by: INTERNAL MEDICINE

## 2025-05-27 PROCEDURE — 99214 OFFICE O/P EST MOD 30 MIN: CPT | Performed by: INTERNAL MEDICINE

## 2025-05-27 PROCEDURE — 1159F MED LIST DOCD IN RCRD: CPT | Performed by: INTERNAL MEDICINE

## 2025-05-27 PROCEDURE — 3075F SYST BP GE 130 - 139MM HG: CPT | Performed by: INTERNAL MEDICINE

## 2025-05-27 PROCEDURE — 1036F TOBACCO NON-USER: CPT | Performed by: INTERNAL MEDICINE

## 2025-05-27 ASSESSMENT — PATIENT HEALTH QUESTIONNAIRE - PHQ9
SUM OF ALL RESPONSES TO PHQ9 QUESTIONS 1 & 2: 0
2. FEELING DOWN, DEPRESSED OR HOPELESS: NOT AT ALL
1. LITTLE INTEREST OR PLEASURE IN DOING THINGS: NOT AT ALL

## 2025-05-27 NOTE — PROGRESS NOTES
Reason for visit: Follow-up on Grave's diease     HPI:   Ms. 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     Background history:  -Established care with us in Oct 2023 and diagnosed her with Grave's disease and significant hyperthyroid Sx. She was started on MMI and the dose was lowered gradually to 2.5 5 mg 4 days/week    Today:  Energy Okay   No sleep disturbance   Good appetite. Weight has been gradually increasing since last appointment (192 lbs -->202 lbs)  Ocular sx: Dry eyes reports using refresh tears, no tearing, no gritty sensation, no itching, no redness.  Reporting photophobia from sun uses sunglasses.  No hoarseness, trouble swallowing, or compressive sx   Denies any chest pain, shortness of breath, palpitations.  Much improved bowel habits s/p magnesium (no further constipation)  No tremors,prox muscle weakness, cramps, tingling.  No heat/cold intol  No nausea/vomit or abdominal pain    Of note, patient had a DEXA scan done in February 2024 that showed a lowest T-score of -3.1 in her lumbar spine.  Currently on Calcium 600 BID and Vit D 5000 international units daily.  She was noted to be on anastrozole in the past for at least 2 years but was switched to tamoxifen (and has been on it for 1 year per patient).  She denies any fragility fractures and reports femoral fracture 2-3 years ago in the setting of significant trauma (slipped on ice). She will  require a repeat DEXA scan in 2/2026.     Meds:  Current Outpatient Medications   Medication Instructions    ALPRAZolam (XANAX) 0.25 mg, oral, 3 times daily PRN    amLODIPine (NORVASC) 2.5 mg, oral, Daily    atorvastatin (LIPITOR) 40 mg, oral, Nightly    cholecalciferol (Vitamin D-3) 50 mcg (2,000 unit) capsule 1 capsule, Daily    magnesium oxide (MAG-OX) 400 mg, Daily    methIMAzole (TAPAZOLE) 2.5 mg, oral, Every Day, 4 days a week    tamoxifen  "(NOLVADEX) 20 mg, oral, Daily, Take with water or any other nonalcoholic drink with or without food at around the same time(s) every day.    valsartan (DIOVAN) 80 mg, oral, Daily       Objective   Lab Results   Component Value Date    TSH 1.83 04/21/2025    TSH 0.96 03/07/2025    TSH 0.94 01/21/2025    TSH 0.14 (L) 09/27/2024    TSH 1.32 06/10/2024    TSH 2.40 04/04/2024    TSH 3.39 02/27/2024    TSH 5.27 (H) 01/31/2024    TSH 5.79 (H) 01/30/2024    TSH 5.67 (H) 01/03/2024    FREET4 1.1 04/21/2025    FREET4 1.1 03/07/2025    FREET4 0.75 01/21/2025    FREET4 0.96 09/27/2024    FREET4 0.73 06/10/2024    FREET4 0.55 (L) 04/04/2024    FREET4 0.86 02/27/2024    FREET4 0.64 01/31/2024    FREET4 0.60 (L) 01/30/2024    FREET4 0.52 (L) 01/03/2024    M1KLXMG 120 04/21/2025    M8KZRLT 99 04/04/2024    J8NBBEK 108 02/27/2024    U8QNRQQ 111 01/30/2024    M0NQWFD 103 01/03/2024    J2SCNFD 79 12/13/2023    E6JBKLF 68 11/17/2023    B9AZPES 138 10/24/2023    F3QPGKL 364 (H) 10/03/2023     (H) 04/21/2025     (H) 01/21/2025    TSI 2.8 (H) 09/27/2024    TSI 3.2 (H) 06/10/2024    TSI 3.4 (H) 02/27/2024    TSI 5.9 (H) 10/03/2023    THYROIDPAB 35 09/13/2023    THYROGLOBULI <0.9 09/13/2023         Last Recorded Vitals  Blood pressure 137/83, pulse 79, height 1.549 m (5' 1\"), weight 91.6 kg (202 lb), last menstrual period 01/01/1990.  Constitutional: -American female, NAD, well groomed. AOx3. Cooperative  Skin/Hair: Warm, dry skin, birthmark over the right thenareminence  HEENT: EOMI, Anicteric scleras, No lid lag or lid retraction, No TTP of ocular globes.  Negative Chvostek sign   Neck: Soft, supple. horizontal scar noted over anterior neck, less palpable thyroid tissue on the R, thick isthmus and nodular thyroid noted on the L   Cardiovascular: normal HR  Extremities: Preserved peripheral pulses, No tremors   Neuro: Moving all extremities spontaneously. CN's grossly intact. No balance or gait disturbances. DTRs: some " delay in relaxation phase.      Assessment/Plan   70-year-old female with Graves' disease who presents for follow-up for Graves' disease.    For Graves' disease:  She is clinically and biochemically euthyroid on methimazole 2.5 mg 4 times per week.  Last TSI was detectable on April 2025 at 316  -Continue methimazole 2.5 mg 4 times per week  -For Graves' orbitopathy we discussed with the patient the following:  Refresh Tears gel at night (if waking up with dry eyes) and Refresh Tears clear during the day  -Repeat labs in 3 months and follow-up in Nov 2025     Case seen, examined, and discussed with Dr. Nickerson

## 2025-05-29 RX ORDER — AMLODIPINE BESYLATE 5 MG/1
5 TABLET ORAL DAILY
Qty: 90 TABLET | Refills: 0 | Status: SHIPPED | OUTPATIENT
Start: 2025-05-29

## 2025-06-06 DIAGNOSIS — I10 PRIMARY HYPERTENSION: ICD-10-CM

## 2025-06-11 RX ORDER — VALSARTAN 80 MG/1
80 TABLET ORAL DAILY
Qty: 90 TABLET | Refills: 0 | Status: SHIPPED | OUTPATIENT
Start: 2025-06-11

## 2025-06-11 NOTE — TELEPHONE ENCOUNTER
Attempted to call pt to advise and neither phone  number in chart working.    This note routed to Dr. Emanuel for refills.

## 2025-07-28 DIAGNOSIS — E05.00 GRAVES' DISEASE: ICD-10-CM

## 2025-07-28 DIAGNOSIS — E05.90 HYPERTHYROIDISM: ICD-10-CM

## 2025-07-30 LAB
ALBUMIN SERPL-MCNC: 3.9 G/DL (ref 3.6–5.1)
ALP SERPL-CCNC: 65 U/L (ref 37–153)
ALT SERPL-CCNC: 12 U/L (ref 6–29)
ANION GAP SERPL CALCULATED.4IONS-SCNC: 7 MMOL/L (CALC) (ref 7–17)
AST SERPL-CCNC: 13 U/L (ref 10–35)
BASOPHILS # BLD AUTO: 22 CELLS/UL (ref 0–200)
BASOPHILS NFR BLD AUTO: 0.4 %
BILIRUB SERPL-MCNC: 0.4 MG/DL (ref 0.2–1.2)
BUN SERPL-MCNC: 14 MG/DL (ref 7–25)
CALCIUM SERPL-MCNC: 9.1 MG/DL (ref 8.6–10.4)
CHLORIDE SERPL-SCNC: 106 MMOL/L (ref 98–110)
CO2 SERPL-SCNC: 29 MMOL/L (ref 20–32)
CREAT SERPL-MCNC: 0.72 MG/DL (ref 0.6–1)
EGFRCR SERPLBLD CKD-EPI 2021: 89 ML/MIN/1.73M2
EOSINOPHIL # BLD AUTO: 90 CELLS/UL (ref 15–500)
EOSINOPHIL NFR BLD AUTO: 1.6 %
ERYTHROCYTE [DISTWIDTH] IN BLOOD BY AUTOMATED COUNT: 12.9 % (ref 11–15)
GLUCOSE SERPL-MCNC: 83 MG/DL (ref 65–99)
HCT VFR BLD AUTO: 45.1 % (ref 35–45)
HGB BLD-MCNC: 14.3 G/DL (ref 11.7–15.5)
LYMPHOCYTES # BLD AUTO: 2206 CELLS/UL (ref 850–3900)
LYMPHOCYTES NFR BLD AUTO: 39.4 %
MCH RBC QN AUTO: 27.8 PG (ref 27–33)
MCHC RBC AUTO-ENTMCNC: 31.7 G/DL (ref 32–36)
MCV RBC AUTO: 87.6 FL (ref 80–100)
MONOCYTES # BLD AUTO: 465 CELLS/UL (ref 200–950)
MONOCYTES NFR BLD AUTO: 8.3 %
NEUTROPHILS # BLD AUTO: 2817 CELLS/UL (ref 1500–7800)
NEUTROPHILS NFR BLD AUTO: 50.3 %
PLATELET # BLD AUTO: 235 THOUSAND/UL (ref 140–400)
PMV BLD REES-ECKER: 9.3 FL (ref 7.5–12.5)
POTASSIUM SERPL-SCNC: 4.2 MMOL/L (ref 3.5–5.3)
PROT SERPL-MCNC: 6.7 G/DL (ref 6.1–8.1)
RBC # BLD AUTO: 5.15 MILLION/UL (ref 3.8–5.1)
SODIUM SERPL-SCNC: 142 MMOL/L (ref 135–146)
T3 SERPL-MCNC: 110 NG/DL (ref 76–181)
T4 FREE SERPL-MCNC: 1 NG/DL (ref 0.8–1.8)
TSH SERPL-ACNC: 0.74 MIU/L (ref 0.4–4.5)
TSI SER-ACNC: 362 % BASELINE
WBC # BLD AUTO: 5.6 THOUSAND/UL (ref 3.8–10.8)

## 2025-08-06 ENCOUNTER — APPOINTMENT (OUTPATIENT)
Dept: RADIOLOGY | Facility: CLINIC | Age: 70
End: 2025-08-06
Payer: MEDICARE

## 2025-08-06 ENCOUNTER — APPOINTMENT (OUTPATIENT)
Dept: SURGICAL ONCOLOGY | Facility: CLINIC | Age: 70
End: 2025-08-06
Payer: MEDICARE

## 2025-10-06 ENCOUNTER — APPOINTMENT (OUTPATIENT)
Dept: PRIMARY CARE | Facility: CLINIC | Age: 70
End: 2025-10-06
Payer: MEDICARE

## 2025-11-25 ENCOUNTER — APPOINTMENT (OUTPATIENT)
Dept: ENDOCRINOLOGY | Facility: CLINIC | Age: 70
End: 2025-11-25
Payer: MEDICARE